# Patient Record
Sex: FEMALE | Race: WHITE | NOT HISPANIC OR LATINO | Employment: OTHER | URBAN - METROPOLITAN AREA
[De-identification: names, ages, dates, MRNs, and addresses within clinical notes are randomized per-mention and may not be internally consistent; named-entity substitution may affect disease eponyms.]

---

## 2017-02-16 ENCOUNTER — RECORDS - HEALTHEAST (OUTPATIENT)
Dept: LAB | Facility: CLINIC | Age: 72
End: 2017-02-16

## 2017-02-16 LAB
CHOLEST SERPL-MCNC: 181 MG/DL
FASTING STATUS PATIENT QL REPORTED: YES
HDLC SERPL-MCNC: 86 MG/DL
LDLC SERPL CALC-MCNC: 84 MG/DL
TRIGL SERPL-MCNC: 55 MG/DL

## 2017-05-09 ENCOUNTER — RECORDS - HEALTHEAST (OUTPATIENT)
Dept: ADMINISTRATIVE | Facility: OTHER | Age: 72
End: 2017-05-09

## 2017-05-16 ENCOUNTER — HOSPITAL ENCOUNTER (OUTPATIENT)
Dept: CARDIOLOGY | Facility: HOSPITAL | Age: 72
Discharge: HOME | End: 2017-05-16
Attending: FAMILY MEDICINE

## 2017-05-16 DIAGNOSIS — R00.2 PALPITATIONS: ICD-10-CM

## 2017-08-16 ENCOUNTER — RECORDS - HEALTHEAST (OUTPATIENT)
Dept: LAB | Facility: CLINIC | Age: 72
End: 2017-08-16

## 2017-08-16 LAB
CHOLEST SERPL-MCNC: 207 MG/DL
FASTING STATUS PATIENT QL REPORTED: YES
HDLC SERPL-MCNC: 108 MG/DL
LDLC SERPL CALC-MCNC: 85 MG/DL
TRIGL SERPL-MCNC: 71 MG/DL

## 2018-03-05 ENCOUNTER — RECORDS - HEALTHEAST (OUTPATIENT)
Dept: LAB | Facility: CLINIC | Age: 73
End: 2018-03-05

## 2018-03-05 LAB
ALBUMIN SERPL-MCNC: 3.7 G/DL (ref 3.5–5)
ALP SERPL-CCNC: 137 U/L (ref 45–120)
ALT SERPL W P-5'-P-CCNC: 27 U/L (ref 0–45)
ANION GAP SERPL CALCULATED.3IONS-SCNC: 9 MMOL/L (ref 5–18)
AST SERPL W P-5'-P-CCNC: 47 U/L (ref 0–40)
BILIRUB SERPL-MCNC: 1.5 MG/DL (ref 0–1)
BUN SERPL-MCNC: 7 MG/DL (ref 8–28)
CALCIUM SERPL-MCNC: 9.4 MG/DL (ref 8.5–10.5)
CHLORIDE BLD-SCNC: 106 MMOL/L (ref 98–107)
CO2 SERPL-SCNC: 24 MMOL/L (ref 22–31)
CREAT SERPL-MCNC: 0.62 MG/DL (ref 0.6–1.1)
GFR SERPL CREATININE-BSD FRML MDRD: >60 ML/MIN/1.73M2
GLUCOSE BLD-MCNC: 86 MG/DL (ref 70–125)
POTASSIUM BLD-SCNC: 3.8 MMOL/L (ref 3.5–5)
PROT SERPL-MCNC: 7 G/DL (ref 6–8)
SODIUM SERPL-SCNC: 139 MMOL/L (ref 136–145)
TSH SERPL DL<=0.005 MIU/L-ACNC: 2.92 UIU/ML (ref 0.3–5)

## 2018-09-17 ENCOUNTER — RECORDS - HEALTHEAST (OUTPATIENT)
Dept: LAB | Facility: CLINIC | Age: 73
End: 2018-09-17

## 2018-09-17 LAB
ALBUMIN SERPL-MCNC: 3.9 G/DL (ref 3.5–5)
ALP SERPL-CCNC: 115 U/L (ref 45–120)
ALT SERPL W P-5'-P-CCNC: 33 U/L (ref 0–45)
ANION GAP SERPL CALCULATED.3IONS-SCNC: 11 MMOL/L (ref 5–18)
AST SERPL W P-5'-P-CCNC: 51 U/L (ref 0–40)
BILIRUB SERPL-MCNC: 1.3 MG/DL (ref 0–1)
BUN SERPL-MCNC: 7 MG/DL (ref 8–28)
CALCIUM SERPL-MCNC: 9.8 MG/DL (ref 8.5–10.5)
CHLORIDE BLD-SCNC: 105 MMOL/L (ref 98–107)
CHOLEST SERPL-MCNC: 203 MG/DL
CO2 SERPL-SCNC: 24 MMOL/L (ref 22–31)
CREAT SERPL-MCNC: 0.65 MG/DL (ref 0.6–1.1)
ERYTHROCYTE [DISTWIDTH] IN BLOOD BY AUTOMATED COUNT: 13.1 % (ref 11–14.5)
FASTING STATUS PATIENT QL REPORTED: ABNORMAL
GFR SERPL CREATININE-BSD FRML MDRD: >60 ML/MIN/1.73M2
GLUCOSE BLD-MCNC: 112 MG/DL (ref 70–125)
HCT VFR BLD AUTO: 43.1 % (ref 35–47)
HDLC SERPL-MCNC: 117 MG/DL
HGB BLD-MCNC: 15.5 G/DL (ref 12–16)
LDLC SERPL CALC-MCNC: 76 MG/DL
MCH RBC QN AUTO: 35.4 PG (ref 27–34)
MCHC RBC AUTO-ENTMCNC: 36 G/DL (ref 32–36)
MCV RBC AUTO: 98 FL (ref 80–100)
PLATELET # BLD AUTO: 156 THOU/UL (ref 140–440)
PMV BLD AUTO: 11.3 FL (ref 8.5–12.5)
POTASSIUM BLD-SCNC: 3.6 MMOL/L (ref 3.5–5)
PROT SERPL-MCNC: 6.9 G/DL (ref 6–8)
RBC # BLD AUTO: 4.38 MILL/UL (ref 3.8–5.4)
SODIUM SERPL-SCNC: 140 MMOL/L (ref 136–145)
TRIGL SERPL-MCNC: 51 MG/DL
TSH SERPL DL<=0.005 MIU/L-ACNC: 5.54 UIU/ML (ref 0.3–5)
WBC: 7.7 THOU/UL (ref 4–11)

## 2019-03-14 ENCOUNTER — RECORDS - HEALTHEAST (OUTPATIENT)
Dept: ADMINISTRATIVE | Facility: OTHER | Age: 74
End: 2019-03-14

## 2019-03-19 ENCOUNTER — AMBULATORY - HEALTHEAST (OUTPATIENT)
Dept: ADMINISTRATIVE | Facility: CLINIC | Age: 74
End: 2019-03-19

## 2019-03-19 DIAGNOSIS — I65.21 CAROTID STENOSIS, RIGHT: ICD-10-CM

## 2019-03-27 ENCOUNTER — RECORDS - HEALTHEAST (OUTPATIENT)
Dept: LAB | Facility: CLINIC | Age: 74
End: 2019-03-27

## 2019-03-27 LAB
ALBUMIN SERPL-MCNC: 3.4 G/DL (ref 3.5–5)
ALP SERPL-CCNC: 132 U/L (ref 45–120)
ALT SERPL W P-5'-P-CCNC: 26 U/L (ref 0–45)
ANION GAP SERPL CALCULATED.3IONS-SCNC: 9 MMOL/L (ref 5–18)
AST SERPL W P-5'-P-CCNC: 40 U/L (ref 0–40)
BILIRUB SERPL-MCNC: 0.7 MG/DL (ref 0–1)
BUN SERPL-MCNC: 10 MG/DL (ref 8–28)
CALCIUM SERPL-MCNC: 9.3 MG/DL (ref 8.5–10.5)
CHLORIDE BLD-SCNC: 108 MMOL/L (ref 98–107)
CO2 SERPL-SCNC: 24 MMOL/L (ref 22–31)
CREAT SERPL-MCNC: 0.65 MG/DL (ref 0.6–1.1)
GFR SERPL CREATININE-BSD FRML MDRD: >60 ML/MIN/1.73M2
GLUCOSE BLD-MCNC: 94 MG/DL (ref 70–125)
POTASSIUM BLD-SCNC: 3.6 MMOL/L (ref 3.5–5)
PROT SERPL-MCNC: 6.4 G/DL (ref 6–8)
SODIUM SERPL-SCNC: 141 MMOL/L (ref 136–145)
T4 FREE SERPL-MCNC: 1.1 NG/DL (ref 0.7–1.8)
TSH SERPL DL<=0.005 MIU/L-ACNC: 0.15 UIU/ML (ref 0.3–5)

## 2019-04-03 ENCOUNTER — COMMUNICATION - HEALTHEAST (OUTPATIENT)
Dept: VASCULAR SURGERY | Facility: CLINIC | Age: 74
End: 2019-04-03

## 2019-04-03 ENCOUNTER — OFFICE VISIT - HEALTHEAST (OUTPATIENT)
Dept: VASCULAR SURGERY | Facility: CLINIC | Age: 74
End: 2019-04-03

## 2019-04-03 DIAGNOSIS — I65.21 CAROTID STENOSIS, RIGHT: ICD-10-CM

## 2019-04-03 RX ORDER — MIRTAZAPINE 15 MG/1
15 TABLET, FILM COATED ORAL AT BEDTIME
Status: SHIPPED | COMMUNITY
Start: 2018-11-26

## 2019-04-03 RX ORDER — ATORVASTATIN CALCIUM 80 MG/1
80 TABLET, FILM COATED ORAL AT BEDTIME
Status: SHIPPED | COMMUNITY
Start: 2016-12-16

## 2019-04-03 RX ORDER — ALBUTEROL SULFATE 90 UG/1
1-2 AEROSOL, METERED RESPIRATORY (INHALATION) 3 TIMES DAILY
Status: SHIPPED | COMMUNITY
Start: 2019-04-03

## 2019-04-03 RX ORDER — SERTRALINE HYDROCHLORIDE 25 MG/1
TABLET, FILM COATED ORAL
Status: SHIPPED | COMMUNITY
Start: 2013-09-16 | End: 2023-01-01

## 2019-04-03 RX ORDER — AMLODIPINE AND BENAZEPRIL HYDROCHLORIDE 10; 20 MG/1; MG/1
1 CAPSULE ORAL DAILY
Status: SHIPPED | COMMUNITY
Start: 2016-06-28

## 2019-04-03 RX ORDER — LEVOTHYROXINE SODIUM 88 UG/1
88 CAPSULE ORAL
Status: SHIPPED | COMMUNITY
Start: 2019-04-03 | End: 2023-01-01

## 2019-04-30 ENCOUNTER — RECORDS - HEALTHEAST (OUTPATIENT)
Dept: LAB | Facility: CLINIC | Age: 74
End: 2019-04-30

## 2019-04-30 LAB — TSH SERPL DL<=0.005 MIU/L-ACNC: 0.36 UIU/ML (ref 0.3–5)

## 2019-09-18 ENCOUNTER — RECORDS - HEALTHEAST (OUTPATIENT)
Dept: LAB | Facility: CLINIC | Age: 74
End: 2019-09-18

## 2019-09-18 LAB
ALBUMIN SERPL-MCNC: 3.5 G/DL (ref 3.5–5)
ALP SERPL-CCNC: 137 U/L (ref 45–120)
ALT SERPL W P-5'-P-CCNC: 17 U/L (ref 0–45)
ANION GAP SERPL CALCULATED.3IONS-SCNC: 14 MMOL/L (ref 5–18)
AST SERPL W P-5'-P-CCNC: 39 U/L (ref 0–40)
BILIRUB SERPL-MCNC: 0.8 MG/DL (ref 0–1)
BUN SERPL-MCNC: 6 MG/DL (ref 8–28)
CALCIUM SERPL-MCNC: 9.1 MG/DL (ref 8.5–10.5)
CHLORIDE BLD-SCNC: 111 MMOL/L (ref 98–107)
CHOLEST SERPL-MCNC: 188 MG/DL
CO2 SERPL-SCNC: 19 MMOL/L (ref 22–31)
CREAT SERPL-MCNC: 0.69 MG/DL (ref 0.6–1.1)
FASTING STATUS PATIENT QL REPORTED: YES
GFR SERPL CREATININE-BSD FRML MDRD: >60 ML/MIN/1.73M2
GLUCOSE BLD-MCNC: 94 MG/DL (ref 70–125)
HDLC SERPL-MCNC: 106 MG/DL
LDLC SERPL CALC-MCNC: 73 MG/DL
POTASSIUM BLD-SCNC: 3.5 MMOL/L (ref 3.5–5)
PROT SERPL-MCNC: 6.4 G/DL (ref 6–8)
SODIUM SERPL-SCNC: 144 MMOL/L (ref 136–145)
TRIGL SERPL-MCNC: 44 MG/DL

## 2019-09-19 LAB — 25(OH)D3 SERPL-MCNC: 47.3 NG/ML (ref 30–80)

## 2020-04-06 ENCOUNTER — RECORDS - HEALTHEAST (OUTPATIENT)
Dept: LAB | Facility: CLINIC | Age: 75
End: 2020-04-06

## 2020-04-06 LAB
ALBUMIN SERPL-MCNC: 3.7 G/DL (ref 3.5–5)
ALP SERPL-CCNC: 118 U/L (ref 45–120)
ALT SERPL W P-5'-P-CCNC: 26 U/L (ref 0–45)
ANION GAP SERPL CALCULATED.3IONS-SCNC: 11 MMOL/L (ref 5–18)
AST SERPL W P-5'-P-CCNC: 51 U/L (ref 0–40)
BILIRUB SERPL-MCNC: 0.9 MG/DL (ref 0–1)
BUN SERPL-MCNC: 9 MG/DL (ref 8–28)
CALCIUM SERPL-MCNC: 8.9 MG/DL (ref 8.5–10.5)
CHLORIDE BLD-SCNC: 107 MMOL/L (ref 98–107)
CO2 SERPL-SCNC: 23 MMOL/L (ref 22–31)
CREAT SERPL-MCNC: 0.67 MG/DL (ref 0.6–1.1)
GFR SERPL CREATININE-BSD FRML MDRD: >60 ML/MIN/1.73M2
GLUCOSE BLD-MCNC: 94 MG/DL (ref 70–125)
POTASSIUM BLD-SCNC: 3.8 MMOL/L (ref 3.5–5)
PROT SERPL-MCNC: 6.7 G/DL (ref 6–8)
SODIUM SERPL-SCNC: 141 MMOL/L (ref 136–145)
TSH SERPL DL<=0.005 MIU/L-ACNC: 0.44 UIU/ML (ref 0.3–5)

## 2020-10-14 ENCOUNTER — RECORDS - HEALTHEAST (OUTPATIENT)
Dept: LAB | Facility: CLINIC | Age: 75
End: 2020-10-14

## 2020-10-14 LAB
ALBUMIN SERPL-MCNC: 3.6 G/DL (ref 3.5–5)
ALP SERPL-CCNC: 124 U/L (ref 45–120)
ALT SERPL W P-5'-P-CCNC: 20 U/L (ref 0–45)
ANION GAP SERPL CALCULATED.3IONS-SCNC: 11 MMOL/L (ref 5–18)
AST SERPL W P-5'-P-CCNC: 41 U/L (ref 0–40)
BASOPHILS # BLD AUTO: 0.1 THOU/UL (ref 0–0.2)
BASOPHILS NFR BLD AUTO: 1 % (ref 0–2)
BILIRUB SERPL-MCNC: 1.1 MG/DL (ref 0–1)
BUN SERPL-MCNC: 6 MG/DL (ref 8–28)
CALCIUM SERPL-MCNC: 8.7 MG/DL (ref 8.5–10.5)
CHLORIDE BLD-SCNC: 109 MMOL/L (ref 98–107)
CHOLEST SERPL-MCNC: 170 MG/DL
CO2 SERPL-SCNC: 22 MMOL/L (ref 22–31)
CREAT SERPL-MCNC: 0.69 MG/DL (ref 0.6–1.1)
EOSINOPHIL # BLD AUTO: 0.2 THOU/UL (ref 0–0.4)
EOSINOPHIL NFR BLD AUTO: 2 % (ref 0–6)
ERYTHROCYTE [DISTWIDTH] IN BLOOD BY AUTOMATED COUNT: 14.6 % (ref 11–14.5)
FASTING STATUS PATIENT QL REPORTED: NORMAL
GFR SERPL CREATININE-BSD FRML MDRD: >60 ML/MIN/1.73M2
GLUCOSE BLD-MCNC: 89 MG/DL (ref 70–125)
HCT VFR BLD AUTO: 43.1 % (ref 35–47)
HDLC SERPL-MCNC: 87 MG/DL
HGB BLD-MCNC: 14.3 G/DL (ref 12–16)
IMM GRANULOCYTES # BLD: 0 THOU/UL
IMM GRANULOCYTES NFR BLD: 0 %
LDLC SERPL CALC-MCNC: 71 MG/DL
LYMPHOCYTES # BLD AUTO: 1.8 THOU/UL (ref 0.8–4.4)
LYMPHOCYTES NFR BLD AUTO: 26 % (ref 20–40)
MCH RBC QN AUTO: 33.3 PG (ref 27–34)
MCHC RBC AUTO-ENTMCNC: 33.2 G/DL (ref 32–36)
MCV RBC AUTO: 100 FL (ref 80–100)
MONOCYTES # BLD AUTO: 0.7 THOU/UL (ref 0–0.9)
MONOCYTES NFR BLD AUTO: 10 % (ref 2–10)
NEUTROPHILS # BLD AUTO: 4.2 THOU/UL (ref 2–7.7)
NEUTROPHILS NFR BLD AUTO: 61 % (ref 50–70)
PLATELET # BLD AUTO: 157 THOU/UL (ref 140–440)
PMV BLD AUTO: 12.1 FL (ref 8.5–12.5)
POTASSIUM BLD-SCNC: 3.8 MMOL/L (ref 3.5–5)
PROT SERPL-MCNC: 6.3 G/DL (ref 6–8)
RBC # BLD AUTO: 4.3 MILL/UL (ref 3.8–5.4)
SODIUM SERPL-SCNC: 142 MMOL/L (ref 136–145)
TRIGL SERPL-MCNC: 60 MG/DL
TSH SERPL DL<=0.005 MIU/L-ACNC: 2.37 UIU/ML (ref 0.3–5)
VIT B12 SERPL-MCNC: 695 PG/ML (ref 213–816)
WBC: 6.9 THOU/UL (ref 4–11)

## 2021-04-14 ENCOUNTER — RECORDS - HEALTHEAST (OUTPATIENT)
Dept: LAB | Facility: CLINIC | Age: 76
End: 2021-04-14

## 2021-04-14 LAB
ALBUMIN SERPL-MCNC: 3.4 G/DL (ref 3.5–5)
ALP SERPL-CCNC: 133 U/L (ref 45–120)
ALT SERPL W P-5'-P-CCNC: 20 U/L (ref 0–45)
ANION GAP SERPL CALCULATED.3IONS-SCNC: 11 MMOL/L (ref 5–18)
AST SERPL W P-5'-P-CCNC: 44 U/L (ref 0–40)
BILIRUB SERPL-MCNC: 0.9 MG/DL (ref 0–1)
BUN SERPL-MCNC: 7 MG/DL (ref 8–28)
CALCIUM SERPL-MCNC: 9.1 MG/DL (ref 8.5–10.5)
CHLORIDE BLD-SCNC: 109 MMOL/L (ref 98–107)
CHOLEST SERPL-MCNC: 154 MG/DL
CO2 SERPL-SCNC: 21 MMOL/L (ref 22–31)
CREAT SERPL-MCNC: 0.71 MG/DL (ref 0.6–1.1)
FASTING STATUS PATIENT QL REPORTED: NORMAL
GFR SERPL CREATININE-BSD FRML MDRD: >60 ML/MIN/1.73M2
GLUCOSE BLD-MCNC: 90 MG/DL (ref 70–125)
HDLC SERPL-MCNC: 82 MG/DL
LDLC SERPL CALC-MCNC: 60 MG/DL
POTASSIUM BLD-SCNC: 3.5 MMOL/L (ref 3.5–5)
PROT SERPL-MCNC: 6.4 G/DL (ref 6–8)
SODIUM SERPL-SCNC: 141 MMOL/L (ref 136–145)
TRIGL SERPL-MCNC: 58 MG/DL

## 2021-04-15 LAB — TSH SERPL DL<=0.005 MIU/L-ACNC: 1.35 UIU/ML (ref 0.3–5)

## 2021-05-24 ENCOUNTER — RECORDS - HEALTHEAST (OUTPATIENT)
Dept: ADMINISTRATIVE | Facility: CLINIC | Age: 76
End: 2021-05-24

## 2021-05-27 NOTE — PROGRESS NOTES
VASCULAR SURGERY OUTPATIENT CONSULT OR VISIT   VASCULAR SURGEON: Jhoana Agosto MD    LOCATION:  [facility] [place]    Jessica Ryenolds   Medical Record #:  [id number]  YOB: 1945  Age:  73 y.o.     Date of Service: 4/3/2019    PRIMARY CARE PROVIDER: Shaylee Jacques MD      Reason for consultation: Consult from Dr Barber for patient with bilateral carotid stenosis    IMPRESSION: [facility] duplex appears to suggest bilateral heavily calcified moderate carotid stenosis.  Patient is completely asymptomatic but has a history of strokes in both her father and mother and is very concerned about her stroke risk.  In the process of quitting smoking.  On aspirin and high-dose statin.  CTA clearly demonstrates right internal carotid artery occlusion.  Left carotid artery has no important stenosis.  Widely patent [place] of [place].    RECOMMENDATION: Patient with occluded right internal carotid artery of unknown unknown duration.  No role for revascularization.  No risk of stroke from this artery.  Protected by widely patent [place] and so overall risk for stroke is mitigated.  Someone with an occluded carotid artery, however has an increased cardiovascular risk both for MI and as much is a 4% risk of stroke and so risk factor optimization is critical.  Continue with smoking cessation attempts and high-dose statin.  Return visit in 1 year with carotid duplex.    HPI:  Jessica Reynolds is a 73 y.o. female who was seen today in consultation for carotid stenosis.  She has never had TIA or amaurosis fugax.  She had a screening ultrasound which demonstrated some degree of stenosis and then test at [facility] which confirmed moderate stenosis bilaterally.    Very concerned about her risk of stroke now given that both her father and mother had strokes.  Father's was fatal.    She herself has not had amaurosis fugax or TIAs or strokes.  No prior cardiac disease either.  Was apparently told that she had some  degree of PAD on her screening studies but is not symptomatic her legs.  Lifelong smoker who is in the process of quitting.  She said she cheated once with a cigarette yesterday.  Now on high-dose statin and aspirin in the lower her risk as well.    History of significant COPD.  Remote history of rectal cancer identified in hemorrhoid but not requiring any major resection.  Other major surgeries.    No other new health issues    PHH:  No past medical history on file.   No past surgical history on file.    ALLERGIES:  Sudafed [pseudoephedrine hcl]    MEDS:    Current Outpatient Medications:      albuterol (PROAIR HFA) 90 mcg/actuation inhaler, 2 puff(s), Disp: , Rfl:      amLODIPine-benazepril (LOTREL) 10-20 mg per capsule, 1 cap(s), Disp: , Rfl:      aspirin (ASPIR-81) 81 MG EC tablet, 1 tab, Disp: , Rfl:      atorvastatin (LIPITOR) 80 MG tablet, 1 tab(s), Disp: , Rfl:      CHOLECALCIFEROL, VITAMIN D3, ORAL, daily., Disp: , Rfl:      levothyroxine (TIROSINT) 88 mcg cap, Take 88 mcg by mouth., Disp: , Rfl:      mirtazapine (REMERON) 15 MG tablet, 1 tab(s), Disp: , Rfl:      mometasone-formoterol (DULERA) 200-5 mcg/actuation HFAA inhaler, 2 puff(s), Disp: , Rfl:      omega-3-dha-epa-dpa-fish oil (OMEGA-3 2100) 1,050-1,200 mg cap, 1 tab(s), Disp: , Rfl:      sertraline (ZOLOFT) 25 MG tablet, TAKE ONE TABLET BY MOUTH EVERY DAY, Disp: , Rfl:      vitamin C-biotin (HAIR-SKIN-NAILS, VIT C-BIOTIN,) 50 mg -1,250 mcg Chew, 3 cap(s), Disp: , Rfl:     SOCIAL HABITS:    Social History     Tobacco Use   Smoking Status Current Every Day Smoker     Types: Cigarettes   Tobacco Comment    4 cigarettes in the past 2 weeks       Social History     Substance and Sexual Activity   Alcohol Use Yes     Alcohol/week: 1.2 oz     Types: 2 Cans of beer per week       Social History     Substance and Sexual Activity   Drug Use No       FAMILY HISTORY:    Family History   Problem Relation Age of Onset     Stroke Mother      Heart disease Father       Stroke Father      Lung cancer Sister      Diabetes Maternal Grandmother      Drug abuse Sister        REVIEW OF SYSTEMS:    A 12 point ROS was reviewed and except for what is listed in the HPI above, all others are negative    PE:  /70   Pulse 80   Resp 16   Wt 131 lb (59.4 kg)   Wt Readings from Last 1 Encounters:   04/03/19 131 lb (59.4 kg)     There is no height or weight on file to calculate BMI.    EXAM:  GENERAL: This is a well-developed 73 y.o. female who appears her stated age  EYES: Grossly normal.  MOUTH: Buccal mucosa normal   CARDIAC:  Not assessed  CHEST/LUNG:  Not Assessed  GASTROINTESINAL (ABDOMEN):Not Assessed   MUSCULOSKELETAL: Grossly normal and both lower extremities are intact.  HEME/LYMPH: No lymphedema  NEUROLOGIC: Focally intact, Alert and oriented x 3.   PSYCH: appropriate affect  INTEGUMENT: No open lesions or ulcers  Pulse Exam:   Carotid: No Bruit      DIAGNOSTIC STUDIES:     Images:  No results found.    I personally reviewed the images and my interpretation is that the [facility] duplex appears to show a very calcified carotid bulbs bilaterally but with peak systolic velocities that are not particularly elevated and normal or low end-diastolic velocities.  Right CCA waveforms are concerning for internal carotid artery occlusion..    LABS:      Sodium   Date Value Ref Range Status   03/27/2019 141 136 - 145 mmol/L Final   09/17/2018 140 136 - 145 mmol/L Final   03/05/2018 139 136 - 145 mmol/L Final     Potassium   Date Value Ref Range Status   03/27/2019 3.6 3.5 - 5.0 mmol/L Final   09/17/2018 3.6 3.5 - 5.0 mmol/L Final   03/05/2018 3.8 3.5 - 5.0 mmol/L Final     Chloride   Date Value Ref Range Status   03/27/2019 108 (H) 98 - 107 mmol/L Final   09/17/2018 105 98 - 107 mmol/L Final   03/05/2018 106 98 - 107 mmol/L Final     BUN   Date Value Ref Range Status   03/27/2019 10 8 - 28 mg/dL Final   09/17/2018 7 (L) 8 - 28 mg/dL Final   03/05/2018 7 (L) 8 - 28 mg/dL Final     Creatinine    Date Value Ref Range Status   03/27/2019 0.65 0.60 - 1.10 mg/dL Final   09/17/2018 0.65 0.60 - 1.10 mg/dL Final   03/05/2018 0.62 0.60 - 1.10 mg/dL Final     Hemoglobin   Date Value Ref Range Status   09/17/2018 15.5 12.0 - 16.0 g/dL Final   09/09/2015 15.4 12.0 - 16.0 g/dL Final   12/20/2010 14.7 12.0 - 16.0 g/dL Final     Platelets   Date Value Ref Range Status   09/17/2018 156 140 - 440 thou/uL Final   09/09/2015 162 140 - 440 thou/uL Final   12/20/2010 161 140 - 440 thou/uL Final         Jhoana Agosto MD  VASCULAR SURGERY

## 2021-05-27 NOTE — PROGRESS NOTES
R Carotid stenosis, right- Dr. Crow referring .US comp @ [facility]  on 3/11/19 and showed moderate to severe R carotid plaque with moderate proximal ICA stenosis. She was aware she had it this past October.

## 2021-06-02 ENCOUNTER — RECORDS - HEALTHEAST (OUTPATIENT)
Dept: ADMINISTRATIVE | Facility: CLINIC | Age: 76
End: 2021-06-02

## 2021-06-02 VITALS — WEIGHT: 131 LBS

## 2021-06-16 PROBLEM — D12.6 ADENOMATOUS POLYP OF COLON: Status: ACTIVE | Noted: 2019-04-03

## 2021-06-16 PROBLEM — K76.0 STEATOSIS OF LIVER: Status: ACTIVE | Noted: 2019-04-03

## 2021-06-16 PROBLEM — F51.01 PRIMARY INSOMNIA: Status: ACTIVE | Noted: 2019-04-03

## 2021-06-16 PROBLEM — F41.9 ANXIETY: Status: ACTIVE | Noted: 2019-04-03

## 2021-06-16 PROBLEM — R74.8 ELEVATED LIVER ENZYMES: Status: ACTIVE | Noted: 2019-04-03

## 2021-06-16 PROBLEM — I65.21 STENOSIS OF RIGHT CAROTID ARTERY: Status: ACTIVE | Noted: 2019-04-03

## 2021-07-13 ENCOUNTER — RECORDS - HEALTHEAST (OUTPATIENT)
Dept: ADMINISTRATIVE | Facility: CLINIC | Age: 76
End: 2021-07-13

## 2021-07-21 ENCOUNTER — RECORDS - HEALTHEAST (OUTPATIENT)
Dept: ADMINISTRATIVE | Facility: CLINIC | Age: 76
End: 2021-07-21

## 2021-07-22 ENCOUNTER — LAB REQUISITION (OUTPATIENT)
Dept: LAB | Facility: CLINIC | Age: 76
End: 2021-07-22

## 2021-07-22 DIAGNOSIS — R35.0 FREQUENCY OF MICTURITION: ICD-10-CM

## 2021-07-22 PROCEDURE — 87086 URINE CULTURE/COLONY COUNT: CPT | Performed by: PHYSICIAN ASSISTANT

## 2021-07-23 LAB — BACTERIA UR CULT: NO GROWTH

## 2021-07-29 ENCOUNTER — LAB REQUISITION (OUTPATIENT)
Dept: LAB | Facility: CLINIC | Age: 76
End: 2021-07-29

## 2021-07-29 DIAGNOSIS — R31.9 HEMATURIA, UNSPECIFIED: ICD-10-CM

## 2021-07-29 PROCEDURE — 87086 URINE CULTURE/COLONY COUNT: CPT | Performed by: PHYSICIAN ASSISTANT

## 2021-07-31 LAB — BACTERIA UR CULT: NO GROWTH

## 2021-09-14 ENCOUNTER — LAB REQUISITION (OUTPATIENT)
Dept: LAB | Facility: CLINIC | Age: 76
End: 2021-09-14

## 2021-09-14 DIAGNOSIS — I10 ESSENTIAL (PRIMARY) HYPERTENSION: ICD-10-CM

## 2021-09-14 DIAGNOSIS — E03.9 HYPOTHYROIDISM, UNSPECIFIED: ICD-10-CM

## 2021-09-14 LAB
ALBUMIN SERPL-MCNC: 3.5 G/DL (ref 3.5–5)
ALP SERPL-CCNC: 125 U/L (ref 45–120)
ALT SERPL W P-5'-P-CCNC: 27 U/L (ref 0–45)
ANION GAP SERPL CALCULATED.3IONS-SCNC: 12 MMOL/L (ref 5–18)
AST SERPL W P-5'-P-CCNC: 47 U/L (ref 0–40)
BILIRUB SERPL-MCNC: 1.1 MG/DL (ref 0–1)
BUN SERPL-MCNC: 8 MG/DL (ref 8–28)
CALCIUM SERPL-MCNC: 9.4 MG/DL (ref 8.5–10.5)
CHLORIDE BLD-SCNC: 107 MMOL/L (ref 98–107)
CO2 SERPL-SCNC: 25 MMOL/L (ref 22–31)
CREAT SERPL-MCNC: 0.82 MG/DL (ref 0.6–1.1)
GFR SERPL CREATININE-BSD FRML MDRD: 69 ML/MIN/1.73M2
GLUCOSE BLD-MCNC: 108 MG/DL (ref 70–125)
POTASSIUM BLD-SCNC: 3.9 MMOL/L (ref 3.5–5)
PROT SERPL-MCNC: 6.5 G/DL (ref 6–8)
SODIUM SERPL-SCNC: 144 MMOL/L (ref 136–145)
TSH SERPL DL<=0.005 MIU/L-ACNC: 0.88 UIU/ML (ref 0.3–5)

## 2021-09-14 PROCEDURE — 82247 BILIRUBIN TOTAL: CPT | Performed by: FAMILY MEDICINE

## 2021-09-14 PROCEDURE — 84443 ASSAY THYROID STIM HORMONE: CPT | Performed by: FAMILY MEDICINE

## 2021-09-14 PROCEDURE — 82040 ASSAY OF SERUM ALBUMIN: CPT | Performed by: FAMILY MEDICINE

## 2022-02-11 ENCOUNTER — LAB REQUISITION (OUTPATIENT)
Dept: LAB | Facility: CLINIC | Age: 77
End: 2022-02-11

## 2022-02-11 DIAGNOSIS — I10 ESSENTIAL (PRIMARY) HYPERTENSION: ICD-10-CM

## 2022-02-11 DIAGNOSIS — E03.9 HYPOTHYROIDISM, UNSPECIFIED: ICD-10-CM

## 2022-02-11 LAB
ALBUMIN SERPL-MCNC: 3.6 G/DL (ref 3.5–5)
ALP SERPL-CCNC: 123 U/L (ref 45–120)
ALT SERPL W P-5'-P-CCNC: 29 U/L (ref 0–45)
ANION GAP SERPL CALCULATED.3IONS-SCNC: 10 MMOL/L (ref 5–18)
AST SERPL W P-5'-P-CCNC: 55 U/L (ref 0–40)
BILIRUB SERPL-MCNC: 0.9 MG/DL (ref 0–1)
BUN SERPL-MCNC: 12 MG/DL (ref 8–28)
CALCIUM SERPL-MCNC: 9.6 MG/DL (ref 8.5–10.5)
CHLORIDE BLD-SCNC: 106 MMOL/L (ref 98–107)
CHOLEST SERPL-MCNC: 186 MG/DL
CO2 SERPL-SCNC: 25 MMOL/L (ref 22–31)
CREAT SERPL-MCNC: 0.8 MG/DL (ref 0.6–1.1)
GFR SERPL CREATININE-BSD FRML MDRD: 75 ML/MIN/1.73M2
GLUCOSE BLD-MCNC: 105 MG/DL (ref 70–125)
HDLC SERPL-MCNC: 112 MG/DL
LDLC SERPL CALC-MCNC: 64 MG/DL
POTASSIUM BLD-SCNC: 3.9 MMOL/L (ref 3.5–5)
PROT SERPL-MCNC: 6.8 G/DL (ref 6–8)
SODIUM SERPL-SCNC: 141 MMOL/L (ref 136–145)
TRIGL SERPL-MCNC: 49 MG/DL
TSH SERPL DL<=0.005 MIU/L-ACNC: 1.76 UIU/ML (ref 0.3–5)

## 2022-02-11 PROCEDURE — 80061 LIPID PANEL: CPT | Performed by: FAMILY MEDICINE

## 2022-02-11 PROCEDURE — 84443 ASSAY THYROID STIM HORMONE: CPT | Performed by: FAMILY MEDICINE

## 2022-02-11 PROCEDURE — 80053 COMPREHEN METABOLIC PANEL: CPT | Performed by: FAMILY MEDICINE

## 2022-07-07 ENCOUNTER — LAB REQUISITION (OUTPATIENT)
Dept: LAB | Facility: CLINIC | Age: 77
End: 2022-07-07
Payer: COMMERCIAL

## 2022-07-07 DIAGNOSIS — Z01.812 ENCOUNTER FOR PREPROCEDURAL LABORATORY EXAMINATION: ICD-10-CM

## 2022-07-07 LAB — SARS-COV-2 RNA RESP QL NAA+PROBE: NEGATIVE

## 2022-07-07 PROCEDURE — U0005 INFEC AGEN DETEC AMPLI PROBE: HCPCS | Mod: ORL | Performed by: STUDENT IN AN ORGANIZED HEALTH CARE EDUCATION/TRAINING PROGRAM

## 2022-07-27 ENCOUNTER — LAB REQUISITION (OUTPATIENT)
Dept: LAB | Facility: CLINIC | Age: 77
End: 2022-07-27

## 2022-07-27 DIAGNOSIS — R31.9 HEMATURIA, UNSPECIFIED: ICD-10-CM

## 2022-07-27 PROCEDURE — 87086 URINE CULTURE/COLONY COUNT: CPT | Performed by: STUDENT IN AN ORGANIZED HEALTH CARE EDUCATION/TRAINING PROGRAM

## 2022-07-29 LAB — BACTERIA UR CULT: NORMAL

## 2022-08-08 ENCOUNTER — LAB REQUISITION (OUTPATIENT)
Dept: LAB | Facility: CLINIC | Age: 77
End: 2022-08-08

## 2022-08-08 DIAGNOSIS — R31.9 HEMATURIA, UNSPECIFIED: ICD-10-CM

## 2022-08-08 PROCEDURE — 87086 URINE CULTURE/COLONY COUNT: CPT | Performed by: FAMILY MEDICINE

## 2022-08-10 ENCOUNTER — LAB REQUISITION (OUTPATIENT)
Dept: LAB | Facility: CLINIC | Age: 77
End: 2022-08-10

## 2022-08-10 DIAGNOSIS — E78.5 HYPERLIPIDEMIA, UNSPECIFIED: ICD-10-CM

## 2022-08-10 LAB
ALBUMIN SERPL BCG-MCNC: 3.8 G/DL (ref 3.5–5.2)
ALP SERPL-CCNC: 105 U/L (ref 35–104)
ALT SERPL W P-5'-P-CCNC: 24 U/L (ref 10–35)
ANION GAP SERPL CALCULATED.3IONS-SCNC: 10 MMOL/L (ref 7–15)
AST SERPL W P-5'-P-CCNC: 36 U/L (ref 10–35)
BACTERIA UR CULT: NORMAL
BILIRUB SERPL-MCNC: 0.7 MG/DL
BUN SERPL-MCNC: 7.4 MG/DL (ref 8–23)
CALCIUM SERPL-MCNC: 9.2 MG/DL (ref 8.8–10.2)
CHLORIDE SERPL-SCNC: 101 MMOL/L (ref 98–107)
CREAT SERPL-MCNC: 0.81 MG/DL (ref 0.51–0.95)
DEPRECATED HCO3 PLAS-SCNC: 23 MMOL/L (ref 22–29)
GFR SERPL CREATININE-BSD FRML MDRD: 74 ML/MIN/1.73M2
GLUCOSE SERPL-MCNC: 143 MG/DL (ref 70–99)
POTASSIUM SERPL-SCNC: 3.9 MMOL/L (ref 3.4–5.3)
PROT SERPL-MCNC: 6 G/DL (ref 6.4–8.3)
SODIUM SERPL-SCNC: 134 MMOL/L (ref 136–145)

## 2022-08-10 PROCEDURE — 80053 COMPREHEN METABOLIC PANEL: CPT | Performed by: FAMILY MEDICINE

## 2023-01-01 ENCOUNTER — LAB REQUISITION (OUTPATIENT)
Dept: LAB | Facility: CLINIC | Age: 78
End: 2023-01-01

## 2023-01-01 ENCOUNTER — HOSPITAL ENCOUNTER (OUTPATIENT)
Facility: HOSPITAL | Age: 78
Discharge: HOME | End: 2023-03-08
Attending: SPECIALIST | Admitting: SPECIALIST
Payer: COMMERCIAL

## 2023-01-01 ENCOUNTER — ANESTHESIA (OUTPATIENT)
Dept: SURGERY | Facility: HOSPITAL | Age: 78
End: 2023-01-01
Payer: COMMERCIAL

## 2023-01-01 ENCOUNTER — MEDICAL CORRESPONDENCE (OUTPATIENT)
Dept: HEALTH INFORMATION MANAGEMENT | Facility: CLINIC | Age: 78
End: 2023-01-01
Payer: COMMERCIAL

## 2023-01-01 ENCOUNTER — TRANSFERRED RECORDS (OUTPATIENT)
Dept: HEALTH INFORMATION MANAGEMENT | Facility: CLINIC | Age: 78
End: 2023-01-01
Payer: COMMERCIAL

## 2023-01-01 ENCOUNTER — HOSPITAL ENCOUNTER (INPATIENT)
Facility: HOSPITAL | Age: 78
LOS: 7 days | DRG: 329 | End: 2024-01-07
Attending: EMERGENCY MEDICINE | Admitting: HOSPITALIST
Payer: COMMERCIAL

## 2023-01-01 ENCOUNTER — HOSPITAL ENCOUNTER (OUTPATIENT)
Dept: CT IMAGING | Facility: HOSPITAL | Age: 78
Discharge: HOME | End: 2023-01-28
Attending: PHYSICIAN ASSISTANT | Admitting: PHYSICIAN ASSISTANT
Payer: COMMERCIAL

## 2023-01-01 ENCOUNTER — TRANSCRIBE ORDERS (OUTPATIENT)
Dept: OTHER | Age: 78
End: 2023-01-01

## 2023-01-01 ENCOUNTER — APPOINTMENT (OUTPATIENT)
Dept: RADIOLOGY | Facility: HOSPITAL | Age: 78
End: 2023-01-01
Attending: SPECIALIST
Payer: COMMERCIAL

## 2023-01-01 ENCOUNTER — APPOINTMENT (OUTPATIENT)
Dept: CT IMAGING | Facility: HOSPITAL | Age: 78
DRG: 329 | End: 2023-01-01
Attending: EMERGENCY MEDICINE
Payer: COMMERCIAL

## 2023-01-01 ENCOUNTER — OFFICE VISIT (OUTPATIENT)
Dept: SURGERY | Facility: CLINIC | Age: 78
End: 2023-01-01
Payer: COMMERCIAL

## 2023-01-01 ENCOUNTER — ANESTHESIA EVENT (OUTPATIENT)
Dept: SURGERY | Facility: HOSPITAL | Age: 78
End: 2023-01-01
Payer: COMMERCIAL

## 2023-01-01 VITALS
HEART RATE: 70 BPM | WEIGHT: 124.1 LBS | SYSTOLIC BLOOD PRESSURE: 134 MMHG | HEIGHT: 62 IN | BODY MASS INDEX: 22.84 KG/M2 | TEMPERATURE: 98.9 F | OXYGEN SATURATION: 97 % | RESPIRATION RATE: 20 BRPM | DIASTOLIC BLOOD PRESSURE: 54 MMHG

## 2023-01-01 DIAGNOSIS — N20.0 STONE, KIDNEY: ICD-10-CM

## 2023-01-01 DIAGNOSIS — I10 ESSENTIAL (PRIMARY) HYPERTENSION: ICD-10-CM

## 2023-01-01 DIAGNOSIS — D62 ACUTE BLOOD LOSS AS CAUSE OF POSTOPERATIVE ANEMIA: Primary | ICD-10-CM

## 2023-01-01 DIAGNOSIS — J02.9 ACUTE PHARYNGITIS, UNSPECIFIED: ICD-10-CM

## 2023-01-01 DIAGNOSIS — K43.9 VENTRAL HERNIA WITHOUT OBSTRUCTION OR GANGRENE: ICD-10-CM

## 2023-01-01 DIAGNOSIS — E03.9 HYPOTHYROIDISM, UNSPECIFIED: ICD-10-CM

## 2023-01-01 DIAGNOSIS — E78.5 HYPERLIPIDEMIA, UNSPECIFIED: ICD-10-CM

## 2023-01-01 DIAGNOSIS — R30.0 DYSURIA: ICD-10-CM

## 2023-01-01 DIAGNOSIS — K62.5 BRBPR (BRIGHT RED BLOOD PER RECTUM): ICD-10-CM

## 2023-01-01 DIAGNOSIS — Z01.818 ENCOUNTER FOR OTHER PREPROCEDURAL EXAMINATION: ICD-10-CM

## 2023-01-01 DIAGNOSIS — K43.9 VENTRAL HERNIA WITHOUT OBSTRUCTION OR GANGRENE: Primary | ICD-10-CM

## 2023-01-01 DIAGNOSIS — N13.30 HYDRONEPHROSIS, LEFT: Primary | ICD-10-CM

## 2023-01-01 DIAGNOSIS — R35.0 FREQUENCY OF MICTURITION: ICD-10-CM

## 2023-01-01 DIAGNOSIS — R31.0 GROSS HEMATURIA: ICD-10-CM

## 2023-01-01 DIAGNOSIS — N39.0 URINARY TRACT INFECTION, SITE NOT SPECIFIED: ICD-10-CM

## 2023-01-01 LAB
ABO/RH(D): NORMAL
ALBUMIN SERPL BCG-MCNC: 3.8 G/DL (ref 3.5–5.2)
ALBUMIN SERPL BCG-MCNC: 3.8 G/DL (ref 3.5–5.2)
ALBUMIN SERPL BCG-MCNC: 4.2 G/DL (ref 3.5–5.2)
ALP SERPL-CCNC: 109 U/L (ref 35–104)
ALP SERPL-CCNC: 119 U/L (ref 35–104)
ALP SERPL-CCNC: 89 U/L (ref 40–150)
ALT SERPL W P-5'-P-CCNC: 21 U/L (ref 0–50)
ALT SERPL W P-5'-P-CCNC: 30 U/L (ref 10–35)
ALT SERPL W P-5'-P-CCNC: 31 U/L (ref 10–35)
ANION GAP SERPL CALCULATED.3IONS-SCNC: 11 MMOL/L (ref 7–15)
ANION GAP SERPL CALCULATED.3IONS-SCNC: 13 MMOL/L (ref 7–15)
ANION GAP SERPL CALCULATED.3IONS-SCNC: 15 MMOL/L (ref 7–15)
ANION GAP SERPL CALCULATED.3IONS-SCNC: 15 MMOL/L (ref 7–15)
ANTIBODY SCREEN: NEGATIVE
APTT PPP: 32 SECONDS (ref 22–38)
AST SERPL W P-5'-P-CCNC: 34 U/L (ref 0–45)
AST SERPL W P-5'-P-CCNC: 55 U/L (ref 10–35)
AST SERPL W P-5'-P-CCNC: 57 U/L (ref 10–35)
BACTERIA SPEC CULT: NORMAL
BACTERIA UR CULT: ABNORMAL
BACTERIA UR CULT: ABNORMAL
BACTERIA UR CULT: NORMAL
BACTERIA UR CULT: NORMAL
BASOPHILS # BLD AUTO: 0 10E3/UL (ref 0–0.2)
BASOPHILS # BLD AUTO: 0.1 10E3/UL (ref 0–0.2)
BASOPHILS NFR BLD AUTO: 1 %
BASOPHILS NFR BLD AUTO: 1 %
BILIRUB DIRECT SERPL-MCNC: <0.2 MG/DL (ref 0–0.3)
BILIRUB SERPL-MCNC: 0.5 MG/DL
BILIRUB SERPL-MCNC: 0.6 MG/DL
BILIRUB SERPL-MCNC: 0.6 MG/DL
BUN SERPL-MCNC: 11.5 MG/DL (ref 8–23)
BUN SERPL-MCNC: 11.7 MG/DL (ref 8–23)
BUN SERPL-MCNC: 8.1 MG/DL (ref 8–23)
BUN SERPL-MCNC: 9 MG/DL (ref 8–23)
CALCIUM SERPL-MCNC: 8.9 MG/DL (ref 8.8–10.2)
CALCIUM SERPL-MCNC: 9.1 MG/DL (ref 8.8–10.2)
CALCIUM SERPL-MCNC: 9.2 MG/DL (ref 8.8–10.2)
CALCIUM SERPL-MCNC: 9.8 MG/DL (ref 8.8–10.2)
CHLORIDE SERPL-SCNC: 101 MMOL/L (ref 98–107)
CHLORIDE SERPL-SCNC: 103 MMOL/L (ref 98–107)
CHLORIDE SERPL-SCNC: 104 MMOL/L (ref 98–107)
CHLORIDE SERPL-SCNC: 94 MMOL/L (ref 98–107)
CHOLEST SERPL-MCNC: 185 MG/DL
CREAT SERPL-MCNC: 0.78 MG/DL (ref 0.51–0.95)
CREAT SERPL-MCNC: 0.84 MG/DL (ref 0.51–0.95)
CREAT SERPL-MCNC: 0.86 MG/DL (ref 0.51–0.95)
CREAT SERPL-MCNC: 0.96 MG/DL (ref 0.51–0.95)
DEPRECATED HCO3 PLAS-SCNC: 19 MMOL/L (ref 22–29)
DEPRECATED HCO3 PLAS-SCNC: 21 MMOL/L (ref 22–29)
DEPRECATED HCO3 PLAS-SCNC: 24 MMOL/L (ref 22–29)
DEPRECATED HCO3 PLAS-SCNC: 26 MMOL/L (ref 22–29)
EGFRCR SERPLBLD CKD-EPI 2021: 60 ML/MIN/1.73M2
EOSINOPHIL # BLD AUTO: 0.2 10E3/UL (ref 0–0.7)
EOSINOPHIL # BLD AUTO: 0.3 10E3/UL (ref 0–0.7)
EOSINOPHIL NFR BLD AUTO: 3 %
EOSINOPHIL NFR BLD AUTO: 4 %
ERYTHROCYTE [DISTWIDTH] IN BLOOD BY AUTOMATED COUNT: 14 % (ref 10–15)
ERYTHROCYTE [DISTWIDTH] IN BLOOD BY AUTOMATED COUNT: 14.6 % (ref 10–15)
GFR SERPL CREATININE-BSD FRML MDRD: 69 ML/MIN/1.73M2
GFR SERPL CREATININE-BSD FRML MDRD: 71 ML/MIN/1.73M2
GFR SERPL CREATININE-BSD FRML MDRD: 77 ML/MIN/1.73M2
GLUCOSE SERPL-MCNC: 106 MG/DL (ref 70–99)
GLUCOSE SERPL-MCNC: 117 MG/DL (ref 70–99)
GLUCOSE SERPL-MCNC: 185 MG/DL (ref 70–99)
GLUCOSE SERPL-MCNC: 90 MG/DL (ref 70–99)
HBA1C MFR BLD: 5.7 %
HCT VFR BLD AUTO: 36.9 % (ref 35–47)
HCT VFR BLD AUTO: 41.1 % (ref 35–47)
HDLC SERPL-MCNC: 110 MG/DL
HGB BLD-MCNC: 12.6 G/DL (ref 11.7–15.7)
HGB BLD-MCNC: 13.7 G/DL (ref 11.7–15.7)
IMM GRANULOCYTES # BLD: 0 10E3/UL
IMM GRANULOCYTES # BLD: 0 10E3/UL
IMM GRANULOCYTES NFR BLD: 0 %
IMM GRANULOCYTES NFR BLD: 1 %
INR PPP: 1.1 (ref 0.85–1.15)
LDLC SERPL CALC-MCNC: 65 MG/DL
LYMPHOCYTES # BLD AUTO: 1.7 10E3/UL (ref 0.8–5.3)
LYMPHOCYTES # BLD AUTO: 1.8 10E3/UL (ref 0.8–5.3)
LYMPHOCYTES NFR BLD AUTO: 23 %
LYMPHOCYTES NFR BLD AUTO: 27 %
MCH RBC QN AUTO: 33 PG (ref 26.5–33)
MCH RBC QN AUTO: 33.7 PG (ref 26.5–33)
MCHC RBC AUTO-ENTMCNC: 33.3 G/DL (ref 31.5–36.5)
MCHC RBC AUTO-ENTMCNC: 34.1 G/DL (ref 31.5–36.5)
MCV RBC AUTO: 101 FL (ref 78–100)
MCV RBC AUTO: 97 FL (ref 78–100)
MONOCYTES # BLD AUTO: 0.6 10E3/UL (ref 0–1.3)
MONOCYTES # BLD AUTO: 0.8 10E3/UL (ref 0–1.3)
MONOCYTES NFR BLD AUTO: 10 %
MONOCYTES NFR BLD AUTO: 10 %
NEUTROPHILS # BLD AUTO: 3.6 10E3/UL (ref 1.6–8.3)
NEUTROPHILS # BLD AUTO: 4.8 10E3/UL (ref 1.6–8.3)
NEUTROPHILS NFR BLD AUTO: 58 %
NEUTROPHILS NFR BLD AUTO: 62 %
NONHDLC SERPL-MCNC: 75 MG/DL
NRBC # BLD AUTO: 0 10E3/UL
NRBC # BLD AUTO: 0 10E3/UL
NRBC BLD AUTO-RTO: 0 /100
NRBC BLD AUTO-RTO: 0 /100
PATH REPORT.COMMENTS IMP SPEC: ABNORMAL
PATH REPORT.COMMENTS IMP SPEC: NORMAL
PATH REPORT.COMMENTS IMP SPEC: YES
PATH REPORT.FINAL DX SPEC: ABNORMAL
PATH REPORT.FINAL DX SPEC: NORMAL
PATH REPORT.GROSS SPEC: ABNORMAL
PATH REPORT.GROSS SPEC: NORMAL
PATH REPORT.MICROSCOPIC SPEC OTHER STN: ABNORMAL
PATH REPORT.MICROSCOPIC SPEC OTHER STN: NORMAL
PATH REPORT.RELEVANT HX SPEC: ABNORMAL
PATH REPORT.RELEVANT HX SPEC: NORMAL
PHOTO IMAGE: NORMAL
PLATELET # BLD AUTO: 166 10E3/UL (ref 150–450)
PLATELET # BLD AUTO: 180 10E3/UL (ref 150–450)
POTASSIUM SERPL-SCNC: 3.8 MMOL/L (ref 3.4–5.3)
POTASSIUM SERPL-SCNC: 3.9 MMOL/L (ref 3.4–5.3)
POTASSIUM SERPL-SCNC: 3.9 MMOL/L (ref 3.4–5.3)
POTASSIUM SERPL-SCNC: 4.1 MMOL/L (ref 3.4–5.3)
PROT SERPL-MCNC: 6 G/DL (ref 6.4–8.3)
PROT SERPL-MCNC: 6.4 G/DL (ref 6.4–8.3)
PROT SERPL-MCNC: 6.7 G/DL (ref 6.4–8.3)
RBC # BLD AUTO: 3.82 10E6/UL (ref 3.8–5.2)
RBC # BLD AUTO: 4.06 10E6/UL (ref 3.8–5.2)
SODIUM SERPL-SCNC: 128 MMOL/L (ref 136–145)
SODIUM SERPL-SCNC: 138 MMOL/L (ref 135–145)
SODIUM SERPL-SCNC: 139 MMOL/L (ref 136–145)
SODIUM SERPL-SCNC: 141 MMOL/L (ref 136–145)
SPECIMEN EXPIRATION DATE: NORMAL
TRIGL SERPL-MCNC: 51 MG/DL
TSH SERPL DL<=0.005 MIU/L-ACNC: 2.07 UIU/ML (ref 0.3–4.2)
TSH SERPL DL<=0.005 MIU/L-ACNC: 3.74 UIU/ML (ref 0.3–4.2)
WBC # BLD AUTO: 6.1 10E3/UL (ref 4–11)
WBC # BLD AUTO: 7.7 10E3/UL (ref 4–11)

## 2023-01-01 PROCEDURE — C1894 INTRO/SHEATH, NON-LASER: HCPCS | Performed by: SPECIALIST

## 2023-01-01 PROCEDURE — 255N000002 HC RX 255 OP 636: Performed by: SPECIALIST

## 2023-01-01 PROCEDURE — 88300 SURGICAL PATH GROSS: CPT | Mod: 26 | Performed by: PATHOLOGY

## 2023-01-01 PROCEDURE — 250N000011 HC RX IP 250 OP 636: Performed by: EMERGENCY MEDICINE

## 2023-01-01 PROCEDURE — 250N000025 HC SEVOFLURANE, PER MIN: Performed by: SPECIALIST

## 2023-01-01 PROCEDURE — 258N000001 HC RX 258: Performed by: SPECIALIST

## 2023-01-01 PROCEDURE — 258N000003 HC RX IP 258 OP 636: Performed by: NURSE ANESTHETIST, CERTIFIED REGISTERED

## 2023-01-01 PROCEDURE — 88112 CYTOPATH CELL ENHANCE TECH: CPT | Mod: 26 | Performed by: PATHOLOGY

## 2023-01-01 PROCEDURE — 87086 URINE CULTURE/COLONY COUNT: CPT | Performed by: NURSE PRACTITIONER

## 2023-01-01 PROCEDURE — 82435 ASSAY OF BLOOD CHLORIDE: CPT | Performed by: STUDENT IN AN ORGANIZED HEALTH CARE EDUCATION/TRAINING PROGRAM

## 2023-01-01 PROCEDURE — 710N000009 HC RECOVERY PHASE 1, LEVEL 1, PER MIN: Performed by: SPECIALIST

## 2023-01-01 PROCEDURE — 87086 URINE CULTURE/COLONY COUNT: CPT | Performed by: STUDENT IN AN ORGANIZED HEALTH CARE EDUCATION/TRAINING PROGRAM

## 2023-01-01 PROCEDURE — C1769 GUIDE WIRE: HCPCS | Performed by: SPECIALIST

## 2023-01-01 PROCEDURE — 86923 COMPATIBILITY TEST ELECTRIC: CPT | Performed by: INTERNAL MEDICINE

## 2023-01-01 PROCEDURE — 86900 BLOOD TYPING SEROLOGIC ABO: CPT | Performed by: EMERGENCY MEDICINE

## 2023-01-01 PROCEDURE — 85610 PROTHROMBIN TIME: CPT | Performed by: EMERGENCY MEDICINE

## 2023-01-01 PROCEDURE — 74174 CTA ABD&PLVS W/CONTRAST: CPT

## 2023-01-01 PROCEDURE — 85025 COMPLETE CBC W/AUTO DIFF WBC: CPT | Performed by: FAMILY MEDICINE

## 2023-01-01 PROCEDURE — 36415 COLL VENOUS BLD VENIPUNCTURE: CPT | Performed by: HOSPITALIST

## 2023-01-01 PROCEDURE — 258N000003 HC RX IP 258 OP 636: Performed by: ANESTHESIOLOGY

## 2023-01-01 PROCEDURE — 120N000001 HC R&B MED SURG/OB

## 2023-01-01 PROCEDURE — 88305 TISSUE EXAM BY PATHOLOGIST: CPT | Mod: TC | Performed by: SPECIALIST

## 2023-01-01 PROCEDURE — 96374 THER/PROPH/DIAG INJ IV PUSH: CPT

## 2023-01-01 PROCEDURE — 74176 CT ABD & PELVIS W/O CONTRAST: CPT

## 2023-01-01 PROCEDURE — 710N000012 HC RECOVERY PHASE 2, PER MINUTE: Performed by: SPECIALIST

## 2023-01-01 PROCEDURE — 80053 COMPREHEN METABOLIC PANEL: CPT | Performed by: FAMILY MEDICINE

## 2023-01-01 PROCEDURE — 83036 HEMOGLOBIN GLYCOSYLATED A1C: CPT | Performed by: HOSPITALIST

## 2023-01-01 PROCEDURE — C9113 INJ PANTOPRAZOLE SODIUM, VIA: HCPCS | Performed by: EMERGENCY MEDICINE

## 2023-01-01 PROCEDURE — 258N000003 HC RX IP 258 OP 636: Performed by: HOSPITALIST

## 2023-01-01 PROCEDURE — 88300 SURGICAL PATH GROSS: CPT | Mod: TC | Performed by: SPECIALIST

## 2023-01-01 PROCEDURE — 99204 OFFICE O/P NEW MOD 45 MIN: CPT | Performed by: SURGERY

## 2023-01-01 PROCEDURE — 250N000009 HC RX 250: Performed by: NURSE ANESTHETIST, CERTIFIED REGISTERED

## 2023-01-01 PROCEDURE — 84443 ASSAY THYROID STIM HORMONE: CPT | Performed by: FAMILY MEDICINE

## 2023-01-01 PROCEDURE — 85730 THROMBOPLASTIN TIME PARTIAL: CPT | Performed by: EMERGENCY MEDICINE

## 2023-01-01 PROCEDURE — 999N000141 HC STATISTIC PRE-PROCEDURE NURSING ASSESSMENT: Performed by: SPECIALIST

## 2023-01-01 PROCEDURE — 80053 COMPREHEN METABOLIC PANEL: CPT | Performed by: EMERGENCY MEDICINE

## 2023-01-01 PROCEDURE — 99223 1ST HOSP IP/OBS HIGH 75: CPT | Performed by: HOSPITALIST

## 2023-01-01 PROCEDURE — 87081 CULTURE SCREEN ONLY: CPT | Performed by: STUDENT IN AN ORGANIZED HEALTH CARE EDUCATION/TRAINING PROGRAM

## 2023-01-01 PROCEDURE — 80061 LIPID PANEL: CPT | Performed by: FAMILY MEDICINE

## 2023-01-01 PROCEDURE — 36415 COLL VENOUS BLD VENIPUNCTURE: CPT | Performed by: EMERGENCY MEDICINE

## 2023-01-01 PROCEDURE — 99285 EMERGENCY DEPT VISIT HI MDM: CPT | Mod: 25

## 2023-01-01 PROCEDURE — 88305 TISSUE EXAM BY PATHOLOGIST: CPT | Mod: 26 | Performed by: PATHOLOGY

## 2023-01-01 PROCEDURE — 88112 CYTOPATH CELL ENHANCE TECH: CPT | Mod: TC | Performed by: SPECIALIST

## 2023-01-01 PROCEDURE — C2617 STENT, NON-COR, TEM W/O DEL: HCPCS | Performed by: SPECIALIST

## 2023-01-01 PROCEDURE — 87086 URINE CULTURE/COLONY COUNT: CPT | Performed by: FAMILY MEDICINE

## 2023-01-01 PROCEDURE — 250N000011 HC RX IP 250 OP 636: Performed by: SPECIALIST

## 2023-01-01 PROCEDURE — 272N000001 HC OR GENERAL SUPPLY STERILE: Performed by: SPECIALIST

## 2023-01-01 PROCEDURE — 999N000180 XR SURGERY CARM FLUORO LESS THAN 5 MIN

## 2023-01-01 PROCEDURE — 85025 COMPLETE CBC W/AUTO DIFF WBC: CPT | Performed by: EMERGENCY MEDICINE

## 2023-01-01 PROCEDURE — 250N000011 HC RX IP 250 OP 636: Performed by: NURSE ANESTHETIST, CERTIFIED REGISTERED

## 2023-01-01 PROCEDURE — 370N000017 HC ANESTHESIA TECHNICAL FEE, PER MIN: Performed by: SPECIALIST

## 2023-01-01 PROCEDURE — 80053 COMPREHEN METABOLIC PANEL: CPT | Performed by: PHYSICIAN ASSISTANT

## 2023-01-01 PROCEDURE — 250N000009 HC RX 250: Performed by: SPECIALIST

## 2023-01-01 PROCEDURE — 360N000082 HC SURGERY LEVEL 2 W/ FLUORO, PER MIN: Performed by: SPECIALIST

## 2023-01-01 PROCEDURE — 85018 HEMOGLOBIN: CPT | Performed by: HOSPITALIST

## 2023-01-01 PROCEDURE — 86923 COMPATIBILITY TEST ELECTRIC: CPT

## 2023-01-01 DEVICE — URETERAL STENT
Type: IMPLANTABLE DEVICE | Site: URETER | Status: FUNCTIONAL
Brand: PERCUFLEX™ PLUS

## 2023-01-01 RX ORDER — MEPERIDINE HYDROCHLORIDE 25 MG/ML
12.5 INJECTION INTRAMUSCULAR; INTRAVENOUS; SUBCUTANEOUS EVERY 5 MIN PRN
Status: DISCONTINUED | OUTPATIENT
Start: 2023-01-01 | End: 2023-01-01 | Stop reason: HOSPADM

## 2023-01-01 RX ORDER — NAPROXEN 250 MG/1
250 TABLET ORAL EVERY 12 HOURS PRN
Qty: 20 TABLET | Refills: 0 | Status: SHIPPED | OUTPATIENT
Start: 2023-01-01 | End: 2023-01-01

## 2023-01-01 RX ORDER — ONDANSETRON 4 MG/1
4 TABLET, ORALLY DISINTEGRATING ORAL EVERY 30 MIN PRN
Status: DISCONTINUED | OUTPATIENT
Start: 2023-01-01 | End: 2023-01-01 | Stop reason: HOSPADM

## 2023-01-01 RX ORDER — ACETAMINOPHEN 325 MG/1
650 TABLET ORAL ONCE
Status: DISCONTINUED | OUTPATIENT
Start: 2023-01-01 | End: 2023-01-01 | Stop reason: HOSPADM

## 2023-01-01 RX ORDER — MIRTAZAPINE 15 MG/1
15 TABLET, FILM COATED ORAL AT BEDTIME
Status: DISCONTINUED | OUTPATIENT
Start: 2023-01-01 | End: 2024-01-01

## 2023-01-01 RX ORDER — PRIMIDONE 50 MG/1
100 TABLET ORAL 2 TIMES DAILY
COMMUNITY

## 2023-01-01 RX ORDER — GLYCOPYRROLATE 0.2 MG/ML
INJECTION, SOLUTION INTRAMUSCULAR; INTRAVENOUS PRN
Status: DISCONTINUED | OUTPATIENT
Start: 2023-01-01 | End: 2023-01-01

## 2023-01-01 RX ORDER — LIDOCAINE 40 MG/G
CREAM TOPICAL
Status: DISCONTINUED | OUTPATIENT
Start: 2023-01-01 | End: 2024-01-01

## 2023-01-01 RX ORDER — FENTANYL CITRATE 50 UG/ML
50 INJECTION, SOLUTION INTRAMUSCULAR; INTRAVENOUS EVERY 5 MIN PRN
Status: DISCONTINUED | OUTPATIENT
Start: 2023-01-01 | End: 2023-01-01 | Stop reason: HOSPADM

## 2023-01-01 RX ORDER — ALBUTEROL SULFATE 90 UG/1
1-2 AEROSOL, METERED RESPIRATORY (INHALATION) 3 TIMES DAILY
Status: DISCONTINUED | OUTPATIENT
Start: 2023-01-01 | End: 2024-01-01

## 2023-01-01 RX ORDER — PROPOFOL 10 MG/ML
INJECTION, EMULSION INTRAVENOUS PRN
Status: DISCONTINUED | OUTPATIENT
Start: 2023-01-01 | End: 2023-01-01

## 2023-01-01 RX ORDER — LIDOCAINE HYDROCHLORIDE 10 MG/ML
INJECTION, SOLUTION INFILTRATION; PERINEURAL PRN
Status: DISCONTINUED | OUTPATIENT
Start: 2023-01-01 | End: 2023-01-01

## 2023-01-01 RX ORDER — AMOXICILLIN 250 MG
1 CAPSULE ORAL 2 TIMES DAILY PRN
Status: DISCONTINUED | OUTPATIENT
Start: 2023-01-01 | End: 2024-01-01

## 2023-01-01 RX ORDER — FLUTICASONE FUROATE AND VILANTEROL 200; 25 UG/1; UG/1
1 POWDER RESPIRATORY (INHALATION) DAILY
Status: DISCONTINUED | OUTPATIENT
Start: 2024-01-01 | End: 2023-01-01 | Stop reason: ALTCHOICE

## 2023-01-01 RX ORDER — SODIUM CHLORIDE, SODIUM LACTATE, POTASSIUM CHLORIDE, CALCIUM CHLORIDE 600; 310; 30; 20 MG/100ML; MG/100ML; MG/100ML; MG/100ML
INJECTION, SOLUTION INTRAVENOUS CONTINUOUS
Status: DISCONTINUED | OUTPATIENT
Start: 2023-01-01 | End: 2023-01-01 | Stop reason: HOSPADM

## 2023-01-01 RX ORDER — LEVOTHYROXINE SODIUM 25 UG/1
75 TABLET ORAL DAILY
Status: DISCONTINUED | OUTPATIENT
Start: 2024-01-01 | End: 2024-01-01

## 2023-01-01 RX ORDER — ALBUTEROL SULFATE 0.83 MG/ML
2.5 SOLUTION RESPIRATORY (INHALATION) EVERY 6 HOURS PRN
Status: DISCONTINUED | OUTPATIENT
Start: 2023-01-01 | End: 2024-01-01

## 2023-01-01 RX ORDER — PRIMIDONE 50 MG/1
100 TABLET ORAL 2 TIMES DAILY
Status: DISCONTINUED | OUTPATIENT
Start: 2023-01-01 | End: 2024-01-01

## 2023-01-01 RX ORDER — ONDANSETRON 2 MG/ML
INJECTION INTRAMUSCULAR; INTRAVENOUS PRN
Status: DISCONTINUED | OUTPATIENT
Start: 2023-01-01 | End: 2023-01-01

## 2023-01-01 RX ORDER — DEXAMETHASONE SODIUM PHOSPHATE 10 MG/ML
INJECTION, SOLUTION INTRAMUSCULAR; INTRAVENOUS PRN
Status: DISCONTINUED | OUTPATIENT
Start: 2023-01-01 | End: 2023-01-01

## 2023-01-01 RX ORDER — SODIUM CHLORIDE 9 MG/ML
INJECTION, SOLUTION INTRAVENOUS CONTINUOUS
Status: DISCONTINUED | OUTPATIENT
Start: 2023-01-01 | End: 2024-01-01

## 2023-01-01 RX ORDER — ATORVASTATIN CALCIUM 40 MG/1
80 TABLET, FILM COATED ORAL AT BEDTIME
Status: DISCONTINUED | OUTPATIENT
Start: 2023-01-01 | End: 2024-01-01

## 2023-01-01 RX ORDER — FENTANYL CITRATE 50 UG/ML
INJECTION, SOLUTION INTRAMUSCULAR; INTRAVENOUS PRN
Status: DISCONTINUED | OUTPATIENT
Start: 2023-01-01 | End: 2023-01-01

## 2023-01-01 RX ORDER — ACETAMINOPHEN 325 MG/1
650 TABLET ORAL ONCE
Status: DISCONTINUED | OUTPATIENT
Start: 2023-01-01 | End: 2023-01-01

## 2023-01-01 RX ORDER — PREDNISONE 10 MG/1
10 TABLET ORAL PRN
COMMUNITY

## 2023-01-01 RX ORDER — ONDANSETRON 2 MG/ML
4 INJECTION INTRAMUSCULAR; INTRAVENOUS EVERY 30 MIN PRN
Status: DISCONTINUED | OUTPATIENT
Start: 2023-01-01 | End: 2023-01-01 | Stop reason: HOSPADM

## 2023-01-01 RX ORDER — LIDOCAINE 40 MG/G
CREAM TOPICAL
Status: DISCONTINUED | OUTPATIENT
Start: 2023-01-01 | End: 2023-01-01 | Stop reason: HOSPADM

## 2023-01-01 RX ORDER — CEFAZOLIN SODIUM/WATER 2 G/20 ML
2 SYRINGE (ML) INTRAVENOUS
Status: COMPLETED | OUTPATIENT
Start: 2023-01-01 | End: 2023-01-01

## 2023-01-01 RX ORDER — LEVOTHYROXINE SODIUM 75 MCG
75 TABLET ORAL DAILY
COMMUNITY
Start: 2023-01-01

## 2023-01-01 RX ORDER — ACETAMINOPHEN 650 MG/1
650 SUPPOSITORY RECTAL EVERY 4 HOURS PRN
Status: DISCONTINUED | OUTPATIENT
Start: 2023-01-01 | End: 2024-01-01

## 2023-01-01 RX ORDER — IOPAMIDOL 755 MG/ML
75 INJECTION, SOLUTION INTRAVASCULAR ONCE
Status: COMPLETED | OUTPATIENT
Start: 2023-01-01 | End: 2023-01-01

## 2023-01-01 RX ORDER — AMOXICILLIN 250 MG
2 CAPSULE ORAL 2 TIMES DAILY PRN
Status: DISCONTINUED | OUTPATIENT
Start: 2023-01-01 | End: 2024-01-01

## 2023-01-01 RX ORDER — ACETAMINOPHEN 325 MG/1
650 TABLET ORAL EVERY 4 HOURS PRN
Status: DISCONTINUED | OUTPATIENT
Start: 2023-01-01 | End: 2024-01-01

## 2023-01-01 RX ORDER — ALBUTEROL SULFATE 0.83 MG/ML
2.5 SOLUTION RESPIRATORY (INHALATION) EVERY 6 HOURS PRN
COMMUNITY

## 2023-01-01 RX ORDER — NAPROXEN 250 MG/1
250 TABLET ORAL EVERY 12 HOURS PRN
Qty: 20 TABLET | Refills: 0 | Status: SHIPPED | OUTPATIENT
Start: 2023-01-01

## 2023-01-01 RX ORDER — CEFAZOLIN SODIUM/WATER 2 G/20 ML
2 SYRINGE (ML) INTRAVENOUS SEE ADMIN INSTRUCTIONS
Status: DISCONTINUED | OUTPATIENT
Start: 2023-01-01 | End: 2023-01-01 | Stop reason: HOSPADM

## 2023-01-01 RX ORDER — FENTANYL CITRATE 50 UG/ML
25 INJECTION, SOLUTION INTRAMUSCULAR; INTRAVENOUS EVERY 5 MIN PRN
Status: DISCONTINUED | OUTPATIENT
Start: 2023-01-01 | End: 2023-01-01 | Stop reason: HOSPADM

## 2023-01-01 RX ADMIN — SODIUM CHLORIDE, POTASSIUM CHLORIDE, SODIUM LACTATE AND CALCIUM CHLORIDE: 600; 310; 30; 20 INJECTION, SOLUTION INTRAVENOUS at 07:34

## 2023-01-01 RX ADMIN — PHENYLEPHRINE HYDROCHLORIDE 100 MCG: 10 INJECTION INTRAVENOUS at 08:40

## 2023-01-01 RX ADMIN — PHENYLEPHRINE HYDROCHLORIDE 100 MCG: 10 INJECTION INTRAVENOUS at 08:20

## 2023-01-01 RX ADMIN — DEXAMETHASONE SODIUM PHOSPHATE 10 MG: 10 INJECTION, SOLUTION INTRAMUSCULAR; INTRAVENOUS at 08:06

## 2023-01-01 RX ADMIN — SODIUM CHLORIDE, POTASSIUM CHLORIDE, SODIUM LACTATE AND CALCIUM CHLORIDE: 600; 310; 30; 20 INJECTION, SOLUTION INTRAVENOUS at 09:33

## 2023-01-01 RX ADMIN — PROPOFOL 100 MG: 10 INJECTION, EMULSION INTRAVENOUS at 08:09

## 2023-01-01 RX ADMIN — ROCURONIUM BROMIDE 50 MG: 50 INJECTION, SOLUTION INTRAVENOUS at 08:09

## 2023-01-01 RX ADMIN — FENTANYL CITRATE 50 MCG: 50 INJECTION, SOLUTION INTRAMUSCULAR; INTRAVENOUS at 09:07

## 2023-01-01 RX ADMIN — PANTOPRAZOLE SODIUM 40 MG: 40 INJECTION, POWDER, FOR SOLUTION INTRAVENOUS at 13:58

## 2023-01-01 RX ADMIN — Medication 2 G: at 08:04

## 2023-01-01 RX ADMIN — PROPOFOL 200 MG: 10 INJECTION, EMULSION INTRAVENOUS at 08:06

## 2023-01-01 RX ADMIN — IOPAMIDOL 75 ML: 755 INJECTION, SOLUTION INTRAVENOUS at 15:25

## 2023-01-01 RX ADMIN — SUGAMMADEX 110 MG: 100 INJECTION, SOLUTION INTRAVENOUS at 09:32

## 2023-01-01 RX ADMIN — PHENYLEPHRINE HYDROCHLORIDE 100 MCG: 10 INJECTION INTRAVENOUS at 09:15

## 2023-01-01 RX ADMIN — SODIUM CHLORIDE: 9 INJECTION, SOLUTION INTRAVENOUS at 19:35

## 2023-01-01 RX ADMIN — GLYCOPYRROLATE 0.2 MG: 0.2 INJECTION, SOLUTION INTRAMUSCULAR; INTRAVENOUS at 08:06

## 2023-01-01 RX ADMIN — FENTANYL CITRATE 50 MCG: 50 INJECTION, SOLUTION INTRAMUSCULAR; INTRAVENOUS at 08:04

## 2023-01-01 RX ADMIN — Medication 100 MG: at 08:11

## 2023-01-01 RX ADMIN — FENTANYL CITRATE 50 MCG: 50 INJECTION, SOLUTION INTRAMUSCULAR; INTRAVENOUS at 08:10

## 2023-01-01 RX ADMIN — LIDOCAINE HYDROCHLORIDE 30 MG: 10 INJECTION, SOLUTION INFILTRATION; PERINEURAL at 08:05

## 2023-01-01 RX ADMIN — ONDANSETRON 4 MG: 2 INJECTION INTRAMUSCULAR; INTRAVENOUS at 09:31

## 2023-01-01 ASSESSMENT — ACTIVITIES OF DAILY LIVING (ADL)
ADLS_ACUITY_SCORE: 35
ADLS_ACUITY_SCORE: 20
ADLS_ACUITY_SCORE: 35
ADLS_ACUITY_SCORE: 20
ADLS_ACUITY_SCORE: 20

## 2023-01-01 ASSESSMENT — COPD QUESTIONNAIRES: COPD: 1

## 2023-03-06 PROBLEM — N13.30 HYDRONEPHROSIS, LEFT: Status: ACTIVE | Noted: 2023-01-01

## 2023-03-08 NOTE — ANESTHESIA POSTPROCEDURE EVALUATION
Patient: Jessica Reynolds    Procedure: Procedure(s):  CYSTOSCOPY WITH LEFT RETROGRADE PYELOGRAM LEFT URETEROSCOPY, BRUSH AND REGULAR BIOPSY LEFT URETER AND LEFT URETERAL STENT PLACEMENT       Anesthesia Type:  General    Note:  Disposition: Outpatient   Postop Pain Control: Uneventful            Sign Out: Well controlled pain   PONV: No   Neuro/Psych: Uneventful            Sign Out: Acceptable/Baseline neuro status   Airway/Respiratory: Uneventful            Sign Out: Acceptable/Baseline resp. status   CV/Hemodynamics: Uneventful            Sign Out: Acceptable CV status; No obvious hypovolemia; No obvious fluid overload   Other NRE:    DID A NON-ROUTINE EVENT OCCUR?            Last vitals:  Vitals Value Taken Time   /60 03/08/23 1000   Temp 37.2  C (98.9  F) 03/08/23 1000   Pulse 72 03/08/23 1012   Resp 24 03/08/23 1006   SpO2 99 % 03/08/23 1012   Vitals shown include unvalidated device data.    Electronically Signed By: Delia Cadena MD  March 8, 2023  10:58 AM

## 2023-03-08 NOTE — ANESTHESIA CARE TRANSFER NOTE
Patient: Jessica Reynolds    Procedure: Procedure(s):  CYSTOSCOPY WITH LEFT RETROGRADE PYELOGRAM LEFT URETEROSCOPY, BRUSH AND REGULAR BIOPSY LEFT URETER AND LEFT URETERAL STENT PLACEMENT       Diagnosis: Ureter filling defect [R93.41]  Abnormal radiologic findings on diagnostic imaging of renal pelvis, ureter, or bladder [R93.41]  Diagnosis Additional Information: No value filed.    Anesthesia Type:   General     Note:    Oropharynx: oropharynx clear of all foreign objects  Level of Consciousness: drowsy  Oxygen Supplementation: face mask  Level of Supplemental Oxygen (L/min / FiO2): 6  Independent Airway: airway patency satisfactory and stable  Dentition: dentition unchanged  Vital Signs Stable: post-procedure vital signs reviewed and stable  Report to RN Given: handoff report given  Patient transferred to: PACU    Handoff Report: Identifed the Patient, Identified the Reponsible Provider, Reviewed the pertinent medical history, Discussed the surgical course, Reviewed Intra-OP anesthesia mangement and issues during anesthesia, Set expectations for post-procedure period and Allowed opportunity for questions and acknowledgement of understanding      Vitals:  Vitals Value Taken Time   /74 03/08/23 0940   Temp 100.3    Pulse 80 03/08/23 0941   Resp 16 03/08/23 0941   SpO2 98    Vitals shown include unvalidated device data.    Electronically Signed By: PRANAV Castle CRNA  March 8, 2023  9:42 AM

## 2023-03-08 NOTE — ANESTHESIA PREPROCEDURE EVALUATION
Anesthesia Pre-Procedure Evaluation    Patient: Jessica Reynolds   MRN: [id number] : 1945        Procedure : Procedure(s):  CYSTOSCOPY WITH LEFT RETROGRADE PYELOGRAM LEFT URETEROSCOPY, BRUSH AND REGULAR BIOPSY LEFT URETER AND LEFT URETERAL STENT PLACEMENT          Past Medical History:   Diagnosis Date     Anxiety      Arthritis      Bladder mass      Carotid stenosis     right     COPD (chronic obstructive pulmonary disease) (H)      Essential tremor      Hepatic steatosis      History of rectal cancer      Hypertension      Macular degeneration (senile) of retina      Osteoporosis      Rosacea      Thyroid disease      Vitamin D deficiency       Past Surgical History:   Procedure Laterality Date     APPENDECTOMY       EYE SURGERY        Allergies   Allergen Reactions     Bactrim [Sulfamethoxazole W/Trimethoprim] Nausea and Vomiting and Diarrhea     Critically high       Sudafed [Pseudoephedrine] Dizziness     Light headed      Social History     Tobacco Use     Smoking status: Every Day     Packs/day: 0.50     Types: Cigarettes     Smokeless tobacco: Not on file     Tobacco comments:     4 cigarettes in the past 2 weeks   Substance Use Topics     Alcohol use: Yes     Alcohol/week: 2.0 standard drinks     Types: 2 Standard drinks or equivalent per week     Comment: N/A beer every other day, zaid 3-5 drinks er week      Wt Readings from Last 1 Encounters:   23 56.3 kg (124 lb 1.6 oz)        Anesthesia Evaluation   Pt has had prior anesthetic.     No history of anesthetic complications       ROS/MED HX  ENT/Pulmonary:     (+) COPD,     Neurologic:       Cardiovascular:     (+) hypertension----- (-) wheezes   METS/Exercise Tolerance:     Hematologic:       Musculoskeletal:       GI/Hepatic:  - neg GI/hepatic ROS   (+) liver disease,     Renal/Genitourinary:     (+) renal disease, type: CRI,     Endo:     (+) thyroid problem,     Psychiatric/Substance Use:       Infectious Disease:       Malignancy:        Other:            Physical Exam    Airway        Mallampati: II   TM distance: > 3 FB   Neck ROM: limited   Mouth opening: > 3 cm    Respiratory Devices and Support         Dental       (+) Multiple crowns, permanant bridges      Cardiovascular          Rhythm and rate: regular and normal     Pulmonary           (+) decreased breath sounds   (-) no wheezes        OUTSIDE LABS:  CBC:   Lab Results   Component Value Date    WBC 6.1 02/20/2023    WBC 6.9 10/14/2020    HGB 13.7 02/20/2023    HGB 14.3 10/14/2020    HCT 41.1 02/20/2023    HCT 43.1 10/14/2020     02/20/2023     10/14/2020     BMP:   Lab Results   Component Value Date     02/20/2023     (L) 01/29/2023    POTASSIUM 3.9 02/20/2023    POTASSIUM 3.8 01/29/2023    CHLORIDE 104 02/20/2023    CHLORIDE 94 (L) 01/29/2023    CO2 24 02/20/2023    CO2 19 (L) 01/29/2023    BUN 11.7 02/20/2023    BUN 9.0 01/29/2023    CR 0.78 02/20/2023    CR 0.86 01/29/2023     (H) 02/20/2023    GLC 90 01/29/2023     COAGS: No results found for: PTT, INR, FIBR  POC: No results found for: BGM, HCG, HCGS  HEPATIC:   Lab Results   Component Value Date    ALBUMIN 4.2 02/20/2023    PROTTOTAL 6.7 02/20/2023    ALT 30 02/20/2023    AST 55 (H) 02/20/2023    ALKPHOS 119 (H) 02/20/2023    BILITOTAL 0.6 02/20/2023     OTHER:   Lab Results   Component Value Date    JERI 9.8 02/20/2023    TSH 2.07 02/20/2023       Anesthesia Plan    ASA Status:  3   NPO Status:  NPO Appropriate    Anesthesia Type: General.     - Airway: LMA              Consents    Anesthesia Plan(s) and associated risks, benefits, and realistic alternatives discussed. Questions answered and patient/representative(s) expressed understanding.     - Discussed: Risks, Benefits and Alternatives for BOTH SEDATION and the PROCEDURE were discussed     - Discussed with:  Patient         Postoperative Care            Comments:                Delia Cadena MD

## 2023-03-08 NOTE — H&P
The pre-operative history and physical examination was reviewed and the patient examined. There are no changes to be made to the history and physical examination.    Suresh Samuels M.D.  Pager [phone]  3/8/2023 7:48 AM    [facility], [address], [address]  [place] [address]  Ph: [phone]  Fax: [phone]

## 2023-03-08 NOTE — ANESTHESIA PROCEDURE NOTES
Airway       Patient location during procedure: OR       Procedure Start/Stop Times: 3/8/2023 8:12 AM  Staff -        CRNA: Amy Castillo APRN CRNA       Performed By: CRNA  Consent for Airway        Urgency: elective  Indications and Patient Condition       Indications for airway management: alessandro-procedural       Induction type:intravenous       Mask difficulty assessment: 1 - vent by mask    Final Airway Details       Final airway type: endotracheal airway       Successful airway: ETT - single  Endotracheal Airway Details        ETT size (mm): 7.0       Cuffed: yes       Successful intubation technique: video laryngoscopy       VL Blade Size: MAC 3       Grade View of Cords: 1       Adjucts: stylet       Position: Right       Measured from: lips       Secured at (cm): 20       Bite block used: None    Post intubation assessment        Placement verified by: capnometry, equal breath sounds and chest rise        Number of attempts at approach: 1       Number of other approaches attempted: 1       Secured with: silk tape       Ease of procedure: easy       Dentition: Intact and Unchanged       Dental guard used and removed. Dental Guard Type: Proguard Red.    Medication(s) Administered   Medication Administration Time: 3/8/2023 8:12 AM

## 2023-03-08 NOTE — OP NOTE
Operative note:    Procedure:    1.  Cystoscopy with left retrograde pyelogram, left ureteroscopy, biopsy of left ureteral tumor and left ureteral stent placement.    Surgeon:    Suresh Samuels MD    Anesthesia:    General    Preoperative diagnosis:    1.  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    Postoperative diagnosis:    Same    The patient was brought to the operating suite where satisfactory general anesthesia was induced.  She was placed in the dorsal lithotomy position on the operating table and sterilely prepped and draped.  A timeout was called.    A 22 Syriac Stortz cystoscope was advanced per urethra into the bladder without difficulty.  The bladder was grossly normal.  Both ureteral orifices were identified.    Next I inserted a #6 open-ended ureteral catheter and attempted to obtain left ureteral washing for cytology but could not aspirate any fluid.  I then attempted to advance the ureteral catheter more proximally but encountered significant obstruction at the level of the pelvic inlet.  I was then able to introduce 8.035 angled Glidewire beyond the obstructing area all the way up into the left kidney.  The ureteral catheter was then advanced over the Glidewire and after this I inserted about 10 to 20 cc of sterile saline into the collecting system and aspirated fluid back which was sent to the lab for cytology.    Contrast was then injected through the catheter outlining the left collecting system which showed a soft tissue radiolucent mass at the level of the pelvic inlet extending about 3 cm distally toward the bladder.  The remaining portion of the left collecting system was normal.  No additional filling defects or tumors were identified.    With some difficulty, I then inserted a rigid ureteroscope per urethra into the bladder up alongside the guidewire about 4 to 5 cm.  At this point I encountered a soft tissue mass tumor within  the left ureter that looked very suspicious for transitional cell carcinoma.  Using a Piranha device I was able to take a small biopsy sample from this tumor under direct vision and also used a brush biopsy to biopsy this mass.  The specimens were both sent to pathology for analysis.    The guidewire was then backloaded onto the cystoscope and a 6 x 26 double-J stent advanced up the guidewire via the Seldinger technique into the left renal pelvis under fluoroscopic guidance.  The stent was confirmed to be in good position.  The dangle string was cut from the stent and the guidewire withdrawn.    The cystoscope was withdrawn after the bladder was drained.    EBL 0 mL.  There were no complications.  The patient was awakened and sent to PACU in satisfactory condition.      Suresh Samuels M.D.  March 8, 2023  Pager [phone]  Cell: [phone]

## 2023-08-07 NOTE — LETTER
8/7/2023         RE: Jessica Reynolds  [address]  [address]  [facility] [address]        Dear Colleague,    Thank you for referring your patient, Jessica Reynolds, to the [facility] [place]. Please see a copy of my visit note below.      HPI:  Jessica Ryenolds is a 77 year old female who was referred to me by Shaylee Jacques for a ventral hernia.  She presents with complaints of a bulge in the epigastric region with increasing size and intermittent dicomfort.   She has noted this for the past several years and states that the hernia is enlarging.  She denies any nausea, vomiting, fevers, chills, bloody bowel movements or any other complaints at this time. Previous surgeries include laparotomy for small intestinal issue, likely bowel obstruction.    Allergies:Bactrim [sulfamethoxazole-trimethoprim] and Sudafed [pseudoephedrine]    Past Medical History:   Diagnosis Date     Anxiety      Arthritis      Bladder mass      Carotid stenosis     right     COPD (chronic obstructive pulmonary disease) (H)      Essential tremor      Hepatic steatosis      History of rectal cancer      Hypertension      Macular degeneration (senile) of retina      Osteoporosis      Rosacea      Thyroid disease      Vitamin D deficiency        Past Surgical History:   Procedure Laterality Date     APPENDECTOMY       COMBINED CYSTOSCOPY, RETROGRADES, EXCHANGE STENT URETER(S) Left 3/8/2023    Procedure: CYSTOSCOPY WITH LEFT RETROGRADE PYELOGRAM LEFT URETEROSCOPY, BRUSH AND REGULAR BIOPSY LEFT URETER AND LEFT URETERAL STENT PLACEMENT;  Surgeon: Suresh Samuels MD;  [place] [facility] [place]     EYE SURGERY         CURRENT MEDS:  Current Outpatient Medications   Medication Sig Dispense Refill     albuterol (PROAIR HFA) 90 mcg/actuation inhaler [ALBUTEROL (PROAIR HFA) 90 MCG/ACTUATION INHALER] 2 puff(s)       albuterol (PROVENTIL) (2.5 MG/3ML) 0.083% neb solution Take 2.5 mg by nebulization every 6  hours as needed for shortness of breath, wheezing or cough       amLODIPine-benazepril (LOTREL) 10-20 mg per capsule [AMLODIPINE-BENAZEPRIL (LOTREL) 10-20 MG PER CAPSULE] 1 cap(s)       atorvastatin (LIPITOR) 80 MG tablet [ATORVASTATIN (LIPITOR) 80 MG TABLET] 1 tab(s)       CHOLECALCIFEROL, VITAMIN D3, ORAL [CHOLECALCIFEROL, VITAMIN D3, ORAL] daily.       mirtazapine (REMERON) 15 MG tablet 15 mg daily       mometasone-formoterol (DULERA) 200-5 mcg/actuation HFAA inhaler Inhale 2 puffs into the lungs daily       Multiple Vitamins-Minerals (PRESERVISION AREDS PO) Take 1 tablet by mouth daily       naproxen (NAPROSYN) 250 MG tablet Take 1 tablet (250 mg) by mouth every 12 hours as needed for other (Inflammatory pain) 20 tablet 0     omega-3-dha-epa-dpa-fish oil (OMEGA-3 2100) 1,050-1,200 mg cap 1 capsule daily       prednisoLONE acetate (PRED MILD) 0.12 % ophthalmic suspension Place 1 drop into both eyes daily       predniSONE (DELTASONE) 10 MG tablet Take 10 mg by mouth as needed 1-2 tabs orally once a day as needed for COPD flare up for 7 days.       primidone (MYSOLINE) 50 MG tablet Take 100 mg by mouth 2 times daily       sertraline (ZOLOFT) 50 MG tablet        SYNTHROID 75 MCG tablet        vitamin C-biotin (HAIR-SKIN-NAILS, VIT C-BIOTIN,) 50 mg -1,250 mcg Chew [VITAMIN C-BIOTIN (HAIR-SKIN-NAILS, VIT C-BIOTIN,) 50 MG -1,250 MCG CHEW] 3 cap(s)         Family History   Problem Relation Age of Onset     Cerebrovascular Disease Mother      Heart Disease Father      Cerebrovascular Disease Father      Lung Cancer Sister      Diabetes Maternal Grandmother      Substance Abuse Sister         reports that she has been smoking cigarettes. She has been smoking an average of .5 packs per day. She does not have any smokeless tobacco history on file. She reports current alcohol use of about 2.0 standard drinks of alcohol per week. She reports that she does not use drugs.    Review of Systems -   The 12 point review of systems   is within normal limits except for as mentioned above in the HPI.  General ROS: No complaints or constitutional symptoms  Ophthalmic ROS: No complaints of visual changes  Skin: No complaints or symptoms   Endocrine: No complaints or symptoms  Hematologic/Lymphatic: No symptoms or complaints  Psychiatric: No symptoms or complaints  Respiratory ROS: no cough, shortness of breath, or wheezing  Cardiovascular ROS: no chest pain or dyspnea on exertion  Gastrointestinal ROS: As per HPI  Genito-Urinary ROS: no dysuria, trouble voiding, or hematuria  Musculoskeletal ROS: no joint or muscle pain  Neurological ROS: no TIA or stroke symptoms      There were no vitals taken for this visit.  There is no height or weight on file to calculate BMI.    EXAM:  GENERAL: Well developed female  HEENT: Extra ocular muscles intact, pupils are round and reactive, sclera is anicteric,   NECK:  No obvious masses or deformities  ABDOMEN: Soft, palpable reducible 5 cm incisional hernia in the epigastric region  NEURO: No obvious defects noted.  EXT: No edema, no obvious deformities or any other abnormalities    IMAGES: CT images from January of this year demonstrate small incisional hernia    Assessment/Plan:    Jessica Reynolds is a 77 year old female with an incisional hernia.  The pathophysiology of these hernias was discussed as were the surgical and non-operative management strategies.      The risks of surgery were discussed which include, but are not limited to, bleeding, infection, recurrent hernia, chronic pain, poor cosmesis, blood clots, stroke, heart attack and death.  Additionally, the risks of observation were discussed which include, but are not limited to, enlargement of the hernia, incarceration, strangulation, pain and death.  Surgical options including open, laparoscopic and robotic hernia repair were discussed in detail.      She understands everything which was discussed and has consented to proceed with surgery.  We  will therefore schedule robotic repair of the hernia if she so desires.  She has a CT from her urologist pending as well as a surgery for her tear ducts in the near future which she would like to have done prior to fixing her hernia.  She will follow-up with me if she would like to entertain surgery to close her hernia.      Brian Jones D.O., FACS  [phone]  [facility] Department of Surgery      Again, thank you for allowing me to participate in the care of your patient.        Sincerely,        Gavin Jones, DO

## 2023-08-07 NOTE — PROGRESS NOTES
HPI:  Jessica Reynolds is a 77 year old female who was referred to me by Shaylee Jacques for a ventral hernia.  She presents with complaints of a bulge in the epigastric region with increasing size and intermittent dicomfort.   She has noted this for the past several years and states that the hernia is enlarging.  She denies any nausea, vomiting, fevers, chills, bloody bowel movements or any other complaints at this time. Previous surgeries include laparotomy for small intestinal issue, likely bowel obstruction.    Allergies:Bactrim [sulfamethoxazole-trimethoprim] and Sudafed [pseudoephedrine]    Past Medical History:   Diagnosis Date    Anxiety     Arthritis     Bladder mass     Carotid stenosis     right    COPD (chronic obstructive pulmonary disease) (H)     Essential tremor     Hepatic steatosis     History of rectal cancer     Hypertension     Macular degeneration (senile) of retina     Osteoporosis     Rosacea     Thyroid disease     Vitamin D deficiency        Past Surgical History:   Procedure Laterality Date    APPENDECTOMY      COMBINED CYSTOSCOPY, RETROGRADES, EXCHANGE STENT URETER(S) Left 3/8/2023    Procedure: CYSTOSCOPY WITH LEFT RETROGRADE PYELOGRAM LEFT URETEROSCOPY, BRUSH AND REGULAR BIOPSY LEFT URETER AND LEFT URETERAL STENT PLACEMENT;  Surgeon: Suresh Samuels MD;  Location: [facility] OR    EYE SURGERY         CURRENT MEDS:  Current Outpatient Medications   Medication Sig Dispense Refill    albuterol (PROAIR HFA) 90 mcg/actuation inhaler [ALBUTEROL (PROAIR HFA) 90 MCG/ACTUATION INHALER] 2 puff(s)      albuterol (PROVENTIL) (2.5 MG/3ML) 0.083% neb solution Take 2.5 mg by nebulization every 6 hours as needed for shortness of breath, wheezing or cough      amLODIPine-benazepril (LOTREL) 10-20 mg per capsule [AMLODIPINE-BENAZEPRIL (LOTREL) 10-20 MG PER CAPSULE] 1 cap(s)      atorvastatin (LIPITOR) 80 MG tablet [ATORVASTATIN (LIPITOR) 80 MG TABLET] 1 tab(s)      CHOLECALCIFEROL, VITAMIN D3,  ORAL [CHOLECALCIFEROL, VITAMIN D3, ORAL] daily.      mirtazapine (REMERON) 15 MG tablet 15 mg daily      mometasone-formoterol (DULERA) 200-5 mcg/actuation HFAA inhaler Inhale 2 puffs into the lungs daily      Multiple Vitamins-Minerals (PRESERVISION AREDS PO) Take 1 tablet by mouth daily      naproxen (NAPROSYN) 250 MG tablet Take 1 tablet (250 mg) by mouth every 12 hours as needed for other (Inflammatory pain) 20 tablet 0    omega-3-dha-epa-dpa-fish oil (OMEGA-3 2100) 1,050-1,200 mg cap 1 capsule daily      prednisoLONE acetate (PRED MILD) 0.12 % ophthalmic suspension Place 1 drop into both eyes daily      predniSONE (DELTASONE) 10 MG tablet Take 10 mg by mouth as needed 1-2 tabs orally once a day as needed for COPD flare up for 7 days.      primidone (MYSOLINE) 50 MG tablet Take 100 mg by mouth 2 times daily      sertraline (ZOLOFT) 50 MG tablet       SYNTHROID 75 MCG tablet       vitamin C-biotin (HAIR-SKIN-NAILS, VIT C-BIOTIN,) 50 mg -1,250 mcg Chew [VITAMIN C-BIOTIN (HAIR-SKIN-NAILS, VIT C-BIOTIN,) 50 MG -1,250 MCG CHEW] 3 cap(s)         Family History   Problem Relation Age of Onset    Cerebrovascular Disease Mother     Heart Disease Father     Cerebrovascular Disease Father     Lung Cancer Sister     Diabetes Maternal Grandmother     Substance Abuse Sister         reports that she has been smoking cigarettes. She has been smoking an average of .5 packs per day. She does not have any smokeless tobacco history on file. She reports current alcohol use of about 2.0 standard drinks of alcohol per week. She reports that she does not use drugs.    Review of Systems -   The 12 point review of systems  is within normal limits except for as mentioned above in the HPI.  General ROS: No complaints or constitutional symptoms  Ophthalmic ROS: No complaints of visual changes  Skin: No complaints or symptoms   Endocrine: No complaints or symptoms  Hematologic/Lymphatic: No symptoms or complaints  Psychiatric: No symptoms or  complaints  Respiratory ROS: no cough, shortness of breath, or wheezing  Cardiovascular ROS: no chest pain or dyspnea on exertion  Gastrointestinal ROS: As per HPI  Genito-Urinary ROS: no dysuria, trouble voiding, or hematuria  Musculoskeletal ROS: no joint or muscle pain  Neurological ROS: no TIA or stroke symptoms      There were no vitals taken for this visit.  There is no height or weight on file to calculate BMI.    EXAM:  GENERAL: Well developed female  HEENT: Extra ocular muscles intact, pupils are round and reactive, sclera is anicteric,   NECK:  No obvious masses or deformities  ABDOMEN: Soft, palpable reducible 5 cm incisional hernia in the epigastric region  NEURO: No obvious defects noted.  EXT: No edema, no obvious deformities or any other abnormalities    IMAGES: CT images from January of this year demonstrate small incisional hernia    Assessment/Plan:    Jessica Reynolds is a 77 year old female with an incisional hernia.  The pathophysiology of these hernias was discussed as were the surgical and non-operative management strategies.      The risks of surgery were discussed which include, but are not limited to, bleeding, infection, recurrent hernia, chronic pain, poor cosmesis, blood clots, stroke, heart attack and death.  Additionally, the risks of observation were discussed which include, but are not limited to, enlargement of the hernia, incarceration, strangulation, pain and death.  Surgical options including open, laparoscopic and robotic hernia repair were discussed in detail.      She understands everything which was discussed and has consented to proceed with surgery.  We will therefore schedule robotic repair of the hernia if she so desires.  She has a CT from her urologist pending as well as a surgery for her tear ducts in the near future which she would like to have done prior to fixing her hernia.  She will follow-up with me if she would like to entertain surgery to close her hernia.       Brian Jones D.O. [facility]  [phone]  [facility] Department of Surgery

## 2023-12-31 PROBLEM — K62.5 BRBPR (BRIGHT RED BLOOD PER RECTUM): Status: ACTIVE | Noted: 2023-01-01

## 2023-12-31 NOTE — PHARMACY-ADMISSION MEDICATION HISTORY
Pharmacy Intern Admission Medication History    Admission medication history is complete. The information provided in this note is only as accurate as the sources available at the time of the update.    Information Source(s): Patient and CareEverywhere/SureScripts via in-person    Pertinent Information: Patient uses ProAir 2-3 times daily.     Changes made to PTA medication list:  Added: None  Deleted: Prednisolone eye drops  Changed:   Preservision - every day -> BID    Medication Affordability:  Not including over the counter (OTC) medications, was there a time in the past 3 months when you did not take your medications as prescribed because of cost?: No    Allergies reviewed with patient and updates made in EHR: yes    Medication History Completed By: Victorino Black 12/31/2023 2:20 PM    PTA Med List   Medication Sig Last Dose    albuterol (PROAIR HFA) 90 mcg/actuation inhaler Inhale 1-2 puffs into the lungs 3 times daily 12/31/2023 at am    albuterol (PROVENTIL) (2.5 MG/3ML) 0.083% neb solution Take 2.5 mg by nebulization every 6 hours as needed for shortness of breath, wheezing or cough Past Month    amLODIPine-benazepril (LOTREL) 10-20 mg per capsule Take 1 capsule by mouth daily 12/31/2023 at am    atorvastatin (LIPITOR) 80 MG tablet Take 80 mg by mouth at bedtime 12/30/2023 at hs    CHOLECALCIFEROL, VITAMIN D3, ORAL Take 2,000 Units by mouth daily 12/31/2023 at am    mirtazapine (REMERON) 15 MG tablet Take 15 mg by mouth at bedtime 12/30/2023 at hs    mometasone-formoterol (DULERA) 200-5 mcg/actuation HFAA inhaler Inhale 2 puffs into the lungs at bedtime 12/30/2023 at hs    Multiple Vitamins-Minerals (PRESERVISION AREDS PO) Take 2 tablets by mouth daily 12/31/2023 at am    naproxen (NAPROSYN) 250 MG tablet Take 1 tablet (250 mg) by mouth every 12 hours as needed for other (Inflammatory pain) Past Month    omega-3-dha-epa-dpa-fish oil (OMEGA-3 2100) 1,050-1,200 mg cap Take 1 capsule by mouth daily  12/31/2023 at am    predniSONE (DELTASONE) 10 MG tablet Take 10 mg by mouth as needed 1-2 tabs orally once a day as needed for COPD flare up for 7 days. Past Week    primidone (MYSOLINE) 50 MG tablet Take 100 mg by mouth 2 times daily 12/31/2023 at am    sertraline (ZOLOFT) 50 MG tablet Take 50 mg by mouth daily 12/31/2023 at am    SYNTHROID 75 MCG tablet Take 75 mcg by mouth daily 12/31/2023 at am    vitamin C-biotin (HAIR-SKIN-NAILS, VIT C-BIOTIN,) 50 mg -1,250 mcg Chew Take 1 capsule by mouth daily 12/31/2023 at am

## 2023-12-31 NOTE — ED TRIAGE NOTES
Black and Bright red bloody stools with clots starting this morning. X3 today so far. Denies dizziness. Years ago had thickened vein in bowel that was bleeding, had emergent surgery at that time. No on thinners.

## 2023-12-31 NOTE — ED NOTES
Pt reports hx of requiring surgical intervention related to a GI blled. Pt reports three  bloody stools starting today. Family is at bedside

## 2023-12-31 NOTE — ED NOTES
Patient had another BM. 400 mL's at this time for a total of 500 mLs bright red liquid stool since arrival to the emergency department.

## 2023-12-31 NOTE — ED NOTES
"[facility] ED Handoff Report    ED Chief Complaint: Bright red blood per rectum    ED Diagnosis:  (K62.5) BRBPR (bright red blood per rectum)  Comment: Black and Bright red bloody stools with clots starting this morning. X3 today PTA. Reports fatigue but denies dizziness. Since arrival, patient has had total of 500 mLs output of liquid bright red stool.  Plan: GI consult, admit       PMH:    Past Medical History:   Diagnosis Date    Anxiety     Arthritis     Bladder mass     Carotid stenosis     right    COPD (chronic obstructive pulmonary disease) (H)     Essential tremor     Hepatic steatosis     History of rectal cancer     Hypertension     Macular degeneration (senile) of retina     Osteoporosis     Rosacea     Thyroid disease     Vitamin D deficiency         Code Status:  No Order     Falls Risk: Yes Band: Applied    Current Living Situation/Residence: lives with their son or daughter     Elimination Status: Continent: Yes     Activity Level: SBA    Patients Preferred Language:  English     Needed: No    Vital Signs:  /62   Pulse 74   Temp 98.2  F (36.8  C) (Oral)   Resp 24   Ht 1.575 m (5' 2\")   Wt 55.8 kg (123 lb)   SpO2 96%   BMI 22.50 kg/m       Pain Score: denies pain    Is the Patient Confused:  No    Last Food or Drink: 12/31/23 at 1100 had biscuit with peanut butter and jelly. Water while here.     Focused Assessment:  Liquid bright red blood per rectum. Denies abdominal pain. Denies dizziness. Reports fatigue.     Tests Performed: Done: Labs and Imaging    Family Dynamics/Concerns: No    Family Updated On Visitor Policy: Yes    Plan of Care Communicated to Family: Yes    Who Was Updated about Plan of Care: Daughter Isabelle whom patient lives with was updated at [phone]    Belongings Checklist Done and Signed by Patient: No. Belongings done but not signed by patient    Medications sent with patient: personal inhaler along with other belongings    Additional " Information: call with questions    RN: Sherin Rhodes RN   12/31/2023 5:46 PM

## 2024-01-01 ENCOUNTER — ANESTHESIA (OUTPATIENT)
Dept: SURGERY | Facility: HOSPITAL | Age: 79
DRG: 329 | End: 2024-01-01
Payer: COMMERCIAL

## 2024-01-01 ENCOUNTER — ANESTHESIA EVENT (OUTPATIENT)
Dept: SURGERY | Facility: HOSPITAL | Age: 79
DRG: 329 | End: 2024-01-01
Payer: COMMERCIAL

## 2024-01-01 ENCOUNTER — APPOINTMENT (OUTPATIENT)
Dept: RADIOLOGY | Facility: HOSPITAL | Age: 79
DRG: 329 | End: 2024-01-01
Payer: COMMERCIAL

## 2024-01-01 ENCOUNTER — APPOINTMENT (OUTPATIENT)
Dept: CT IMAGING | Facility: HOSPITAL | Age: 79
DRG: 329 | End: 2024-01-01
Attending: INTERNAL MEDICINE
Payer: COMMERCIAL

## 2024-01-01 ENCOUNTER — APPOINTMENT (OUTPATIENT)
Dept: RADIOLOGY | Facility: HOSPITAL | Age: 79
DRG: 329 | End: 2024-01-01
Attending: STUDENT IN AN ORGANIZED HEALTH CARE EDUCATION/TRAINING PROGRAM
Payer: COMMERCIAL

## 2024-01-01 ENCOUNTER — APPOINTMENT (OUTPATIENT)
Dept: ULTRASOUND IMAGING | Facility: HOSPITAL | Age: 79
DRG: 329 | End: 2024-01-01
Attending: STUDENT IN AN ORGANIZED HEALTH CARE EDUCATION/TRAINING PROGRAM
Payer: COMMERCIAL

## 2024-01-01 ENCOUNTER — APPOINTMENT (OUTPATIENT)
Dept: INTERVENTIONAL RADIOLOGY/VASCULAR | Facility: HOSPITAL | Age: 79
DRG: 329 | End: 2024-01-01
Attending: RADIOLOGY
Payer: COMMERCIAL

## 2024-01-01 ENCOUNTER — APPOINTMENT (OUTPATIENT)
Dept: RADIOLOGY | Facility: HOSPITAL | Age: 79
DRG: 329 | End: 2024-01-01
Attending: INTERNAL MEDICINE
Payer: COMMERCIAL

## 2024-01-01 VITALS
HEART RATE: 97 BPM | BODY MASS INDEX: 22.31 KG/M2 | OXYGEN SATURATION: 71 % | RESPIRATION RATE: 16 BRPM | SYSTOLIC BLOOD PRESSURE: 100 MMHG | TEMPERATURE: 98 F | WEIGHT: 121.25 LBS | DIASTOLIC BLOOD PRESSURE: 54 MMHG | HEIGHT: 62 IN

## 2024-01-01 LAB
ABO/RH(D): NORMAL
ALBUMIN SERPL BCG-MCNC: 1.9 G/DL (ref 3.5–5.2)
ALBUMIN UR-MCNC: 20 MG/DL
ALP SERPL-CCNC: 46 U/L (ref 40–150)
ALT SERPL W P-5'-P-CCNC: 9 U/L (ref 0–50)
ANION GAP SERPL CALCULATED.3IONS-SCNC: 11 MMOL/L (ref 7–15)
ANION GAP SERPL CALCULATED.3IONS-SCNC: 13 MMOL/L (ref 7–15)
ANION GAP SERPL CALCULATED.3IONS-SCNC: 7 MMOL/L (ref 7–15)
ANION GAP SERPL CALCULATED.3IONS-SCNC: 7 MMOL/L (ref 7–15)
ANION GAP SERPL CALCULATED.3IONS-SCNC: 8 MMOL/L (ref 7–15)
ANION GAP SERPL CALCULATED.3IONS-SCNC: 8 MMOL/L (ref 7–15)
ANION GAP SERPL CALCULATED.3IONS-SCNC: 9 MMOL/L (ref 7–15)
ANTIBODY SCREEN: NEGATIVE
APPEARANCE UR: CLEAR
AST SERPL W P-5'-P-CCNC: 53 U/L (ref 0–45)
ATRIAL RATE - MUSE: 69 BPM
ATRIAL RATE - MUSE: 74 BPM
BACTERIA UR CULT: ABNORMAL
BASE EXCESS BLDA CALC-SCNC: -7.9 MMOL/L
BASE EXCESS BLDV CALC-SCNC: -9.1 MMOL/L
BASE EXCESS BLDV CALC-SCNC: 4.7 MMOL/L
BILIRUB SERPL-MCNC: 0.6 MG/DL
BILIRUB UR QL STRIP: NEGATIVE
BLD PROD TYP BPU: NORMAL
BLOOD COMPONENT TYPE: NORMAL
BUN SERPL-MCNC: 11.2 MG/DL (ref 8–23)
BUN SERPL-MCNC: 11.8 MG/DL (ref 8–23)
BUN SERPL-MCNC: 14 MG/DL (ref 8–23)
BUN SERPL-MCNC: 14.5 MG/DL (ref 8–23)
BUN SERPL-MCNC: 16.1 MG/DL (ref 8–23)
BUN SERPL-MCNC: 21 MG/DL (ref 8–23)
BUN SERPL-MCNC: 26.5 MG/DL (ref 8–23)
BUN SERPL-MCNC: 26.9 MG/DL (ref 8–23)
BUN SERPL-MCNC: 28.6 MG/DL (ref 8–23)
CALCIUM SERPL-MCNC: 7.2 MG/DL (ref 8.8–10.2)
CALCIUM SERPL-MCNC: 7.4 MG/DL (ref 8.8–10.2)
CALCIUM SERPL-MCNC: 7.4 MG/DL (ref 8.8–10.2)
CALCIUM SERPL-MCNC: 7.5 MG/DL (ref 8.8–10.2)
CALCIUM SERPL-MCNC: 7.7 MG/DL (ref 8.8–10.2)
CALCIUM SERPL-MCNC: 7.7 MG/DL (ref 8.8–10.2)
CALCIUM SERPL-MCNC: 7.9 MG/DL (ref 8.8–10.2)
CALCIUM SERPL-MCNC: 8 MG/DL (ref 8.8–10.2)
CALCIUM SERPL-MCNC: 8.9 MG/DL (ref 8.8–10.2)
CALCIUM, IONIZED MEASURED: 1.06 MMOL/L (ref 1.11–1.3)
CHLORIDE SERPL-SCNC: 109 MMOL/L (ref 98–107)
CHLORIDE SERPL-SCNC: 110 MMOL/L (ref 98–107)
CHLORIDE SERPL-SCNC: 110 MMOL/L (ref 98–107)
CHLORIDE SERPL-SCNC: 111 MMOL/L (ref 98–107)
CHLORIDE SERPL-SCNC: 112 MMOL/L (ref 98–107)
CODING SYSTEM: NORMAL
COHGB MFR BLD: 93.9 % (ref 95–96)
COLONOSCOPY: NORMAL
COLONOSCOPY: NORMAL
COLOR UR AUTO: YELLOW
CREAT SERPL-MCNC: 0.92 MG/DL (ref 0.51–0.95)
CREAT SERPL-MCNC: 0.94 MG/DL (ref 0.51–0.95)
CREAT SERPL-MCNC: 0.95 MG/DL (ref 0.51–0.95)
CREAT SERPL-MCNC: 0.96 MG/DL (ref 0.51–0.95)
CREAT SERPL-MCNC: 1.13 MG/DL (ref 0.51–0.95)
CREAT SERPL-MCNC: 1.39 MG/DL (ref 0.51–0.95)
CREAT SERPL-MCNC: 1.6 MG/DL (ref 0.51–0.95)
CREAT SERPL-MCNC: 1.76 MG/DL (ref 0.51–0.95)
CREAT SERPL-MCNC: 1.78 MG/DL (ref 0.51–0.95)
CROSSMATCH: NORMAL
DEPRECATED HCO3 PLAS-SCNC: 14 MMOL/L (ref 22–29)
DEPRECATED HCO3 PLAS-SCNC: 16 MMOL/L (ref 22–29)
DEPRECATED HCO3 PLAS-SCNC: 18 MMOL/L (ref 22–29)
DEPRECATED HCO3 PLAS-SCNC: 18 MMOL/L (ref 22–29)
DEPRECATED HCO3 PLAS-SCNC: 19 MMOL/L (ref 22–29)
DEPRECATED HCO3 PLAS-SCNC: 19 MMOL/L (ref 22–29)
DEPRECATED HCO3 PLAS-SCNC: 20 MMOL/L (ref 22–29)
DEPRECATED HCO3 PLAS-SCNC: 24 MMOL/L (ref 22–29)
DEPRECATED HCO3 PLAS-SCNC: 24 MMOL/L (ref 22–29)
DIASTOLIC BLOOD PRESSURE - MUSE: NORMAL MMHG
DIASTOLIC BLOOD PRESSURE - MUSE: NORMAL MMHG
EGFRCR SERPLBLD CKD-EPI 2021: 29 ML/MIN/1.73M2
EGFRCR SERPLBLD CKD-EPI 2021: 29 ML/MIN/1.73M2
EGFRCR SERPLBLD CKD-EPI 2021: 32 ML/MIN/1.73M2
EGFRCR SERPLBLD CKD-EPI 2021: 38 ML/MIN/1.73M2
EGFRCR SERPLBLD CKD-EPI 2021: 49 ML/MIN/1.73M2
EGFRCR SERPLBLD CKD-EPI 2021: 60 ML/MIN/1.73M2
EGFRCR SERPLBLD CKD-EPI 2021: 61 ML/MIN/1.73M2
EGFRCR SERPLBLD CKD-EPI 2021: 61 ML/MIN/1.73M2
EGFRCR SERPLBLD CKD-EPI 2021: 63 ML/MIN/1.73M2
ERYTHROCYTE [DISTWIDTH] IN BLOOD BY AUTOMATED COUNT: 14.7 % (ref 10–15)
ERYTHROCYTE [DISTWIDTH] IN BLOOD BY AUTOMATED COUNT: 14.7 % (ref 10–15)
ERYTHROCYTE [DISTWIDTH] IN BLOOD BY AUTOMATED COUNT: 16.6 % (ref 10–15)
ERYTHROCYTE [DISTWIDTH] IN BLOOD BY AUTOMATED COUNT: 16.9 % (ref 10–15)
ERYTHROCYTE [DISTWIDTH] IN BLOOD BY AUTOMATED COUNT: 17.6 % (ref 10–15)
ERYTHROCYTE [DISTWIDTH] IN BLOOD BY AUTOMATED COUNT: 17.9 % (ref 10–15)
ERYTHROCYTE [DISTWIDTH] IN BLOOD BY AUTOMATED COUNT: 18.3 % (ref 10–15)
ERYTHROCYTE [DISTWIDTH] IN BLOOD BY AUTOMATED COUNT: 18.6 % (ref 10–15)
FIBRINOGEN PPP-MCNC: 302 MG/DL (ref 170–490)
GLUCOSE BLDC GLUCOMTR-MCNC: 100 MG/DL (ref 70–99)
GLUCOSE BLDC GLUCOMTR-MCNC: 102 MG/DL (ref 70–99)
GLUCOSE BLDC GLUCOMTR-MCNC: 105 MG/DL (ref 70–99)
GLUCOSE BLDC GLUCOMTR-MCNC: 107 MG/DL (ref 70–99)
GLUCOSE BLDC GLUCOMTR-MCNC: 135 MG/DL (ref 70–99)
GLUCOSE BLDC GLUCOMTR-MCNC: 145 MG/DL (ref 70–99)
GLUCOSE BLDC GLUCOMTR-MCNC: 145 MG/DL (ref 70–99)
GLUCOSE BLDC GLUCOMTR-MCNC: 147 MG/DL (ref 70–99)
GLUCOSE BLDC GLUCOMTR-MCNC: 150 MG/DL (ref 70–99)
GLUCOSE BLDC GLUCOMTR-MCNC: 156 MG/DL (ref 70–99)
GLUCOSE BLDC GLUCOMTR-MCNC: 163 MG/DL (ref 70–99)
GLUCOSE BLDC GLUCOMTR-MCNC: 171 MG/DL (ref 70–99)
GLUCOSE BLDC GLUCOMTR-MCNC: 196 MG/DL (ref 70–99)
GLUCOSE BLDC GLUCOMTR-MCNC: 214 MG/DL (ref 70–99)
GLUCOSE BLDC GLUCOMTR-MCNC: 217 MG/DL (ref 70–99)
GLUCOSE BLDC GLUCOMTR-MCNC: 231 MG/DL (ref 70–99)
GLUCOSE BLDC GLUCOMTR-MCNC: 235 MG/DL (ref 70–99)
GLUCOSE BLDC GLUCOMTR-MCNC: 76 MG/DL (ref 70–99)
GLUCOSE BLDC GLUCOMTR-MCNC: 80 MG/DL (ref 70–99)
GLUCOSE BLDC GLUCOMTR-MCNC: 89 MG/DL (ref 70–99)
GLUCOSE BLDC GLUCOMTR-MCNC: 94 MG/DL (ref 70–99)
GLUCOSE BLDC GLUCOMTR-MCNC: 97 MG/DL (ref 70–99)
GLUCOSE SERPL-MCNC: 100 MG/DL (ref 70–99)
GLUCOSE SERPL-MCNC: 113 MG/DL (ref 70–99)
GLUCOSE SERPL-MCNC: 149 MG/DL (ref 70–99)
GLUCOSE SERPL-MCNC: 178 MG/DL (ref 70–99)
GLUCOSE SERPL-MCNC: 234 MG/DL (ref 70–99)
GLUCOSE SERPL-MCNC: 350 MG/DL (ref 70–99)
GLUCOSE SERPL-MCNC: 80 MG/DL (ref 70–99)
GLUCOSE SERPL-MCNC: 90 MG/DL (ref 70–99)
GLUCOSE SERPL-MCNC: 94 MG/DL (ref 70–99)
GLUCOSE UR STRIP-MCNC: NEGATIVE MG/DL
HCO3 BLD-SCNC: 17 MMOL/L (ref 23–29)
HCO3 BLDV-SCNC: 16 MMOL/L (ref 24–30)
HCO3 BLDV-SCNC: 29 MMOL/L (ref 24–30)
HCT VFR BLD AUTO: 19.7 % (ref 35–47)
HCT VFR BLD AUTO: 20.4 % (ref 35–47)
HCT VFR BLD AUTO: 21.3 % (ref 35–47)
HCT VFR BLD AUTO: 21.4 % (ref 35–47)
HCT VFR BLD AUTO: 22.5 % (ref 35–47)
HCT VFR BLD AUTO: 22.8 % (ref 35–47)
HCT VFR BLD AUTO: 23.3 % (ref 35–47)
HCT VFR BLD AUTO: 31.7 % (ref 35–47)
HGB BLD-MCNC: 11 G/DL (ref 11.7–15.7)
HGB BLD-MCNC: 11.1 G/DL (ref 11.7–15.7)
HGB BLD-MCNC: 6.4 G/DL (ref 11.7–15.7)
HGB BLD-MCNC: 6.7 G/DL (ref 11.7–15.7)
HGB BLD-MCNC: 6.7 G/DL (ref 11.7–15.7)
HGB BLD-MCNC: 6.9 G/DL (ref 11.7–15.7)
HGB BLD-MCNC: 7.2 G/DL (ref 11.7–15.7)
HGB BLD-MCNC: 7.2 G/DL (ref 11.7–15.7)
HGB BLD-MCNC: 7.3 G/DL (ref 11.7–15.7)
HGB BLD-MCNC: 7.3 G/DL (ref 11.7–15.7)
HGB BLD-MCNC: 7.4 G/DL (ref 11.7–15.7)
HGB BLD-MCNC: 7.4 G/DL (ref 11.7–15.7)
HGB BLD-MCNC: 7.5 G/DL (ref 11.7–15.7)
HGB BLD-MCNC: 7.5 G/DL (ref 11.7–15.7)
HGB BLD-MCNC: 7.7 G/DL (ref 11.7–15.7)
HGB BLD-MCNC: 7.8 G/DL (ref 11.7–15.7)
HGB BLD-MCNC: 7.9 G/DL (ref 11.7–15.7)
HGB BLD-MCNC: 7.9 G/DL (ref 11.7–15.7)
HGB BLD-MCNC: 8.3 G/DL (ref 11.7–15.7)
HGB BLD-MCNC: 8.4 G/DL (ref 11.7–15.7)
HGB BLD-MCNC: 8.4 G/DL (ref 11.7–15.7)
HGB BLD-MCNC: 8.7 G/DL (ref 11.7–15.7)
HGB BLD-MCNC: 9 G/DL (ref 11.7–15.7)
HGB UR QL STRIP: NEGATIVE
HOLD SPECIMEN: NORMAL
HOLD SPECIMEN: NORMAL
HYALINE CASTS: 13 /LPF
INR PPP: 1.76 (ref 0.85–1.15)
INTERPRETATION ECG - MUSE: NORMAL
INTERPRETATION ECG - MUSE: NORMAL
ION CA PH 7.4: 1.01 MMOL/L (ref 1.11–1.3)
ISSUE DATE AND TIME: NORMAL
KETONES UR STRIP-MCNC: ABNORMAL MG/DL
LACTATE SERPL-SCNC: 2.1 MMOL/L (ref 0.7–2)
LACTATE SERPL-SCNC: 2.9 MMOL/L (ref 0.7–2)
LACTATE SERPL-SCNC: 3 MMOL/L (ref 0.7–2)
LACTATE SERPL-SCNC: 3.5 MMOL/L (ref 0.7–2)
LACTATE SERPL-SCNC: 3.8 MMOL/L (ref 0.7–2)
LACTATE SERPL-SCNC: 4.3 MMOL/L (ref 0.7–2)
LACTATE SERPL-SCNC: 4.3 MMOL/L (ref 0.7–2)
LEUKOCYTE ESTERASE UR QL STRIP: ABNORMAL
MAGNESIUM SERPL-MCNC: 1.6 MG/DL (ref 1.7–2.3)
MAGNESIUM SERPL-MCNC: 1.7 MG/DL (ref 1.7–2.3)
MAGNESIUM SERPL-MCNC: 1.7 MG/DL (ref 1.7–2.3)
MAGNESIUM SERPL-MCNC: 1.8 MG/DL (ref 1.7–2.3)
MAGNESIUM SERPL-MCNC: 1.9 MG/DL (ref 1.7–2.3)
MCH RBC QN AUTO: 29.5 PG (ref 26.5–33)
MCH RBC QN AUTO: 29.9 PG (ref 26.5–33)
MCH RBC QN AUTO: 30.3 PG (ref 26.5–33)
MCH RBC QN AUTO: 30.4 PG (ref 26.5–33)
MCH RBC QN AUTO: 30.5 PG (ref 26.5–33)
MCH RBC QN AUTO: 31.2 PG (ref 26.5–33)
MCH RBC QN AUTO: 31.7 PG (ref 26.5–33)
MCH RBC QN AUTO: 32.7 PG (ref 26.5–33)
MCHC RBC AUTO-ENTMCNC: 32.5 G/DL (ref 31.5–36.5)
MCHC RBC AUTO-ENTMCNC: 33.3 G/DL (ref 31.5–36.5)
MCHC RBC AUTO-ENTMCNC: 33.5 G/DL (ref 31.5–36.5)
MCHC RBC AUTO-ENTMCNC: 33.8 G/DL (ref 31.5–36.5)
MCHC RBC AUTO-ENTMCNC: 34.6 G/DL (ref 31.5–36.5)
MCHC RBC AUTO-ENTMCNC: 34.7 G/DL (ref 31.5–36.5)
MCV RBC AUTO: 88 FL (ref 78–100)
MCV RBC AUTO: 90 FL (ref 78–100)
MCV RBC AUTO: 94 FL (ref 78–100)
MCV RBC AUTO: 98 FL (ref 78–100)
MUCOUS THREADS #/AREA URNS LPF: PRESENT /LPF
NITRATE UR QL: NEGATIVE
NT-PROBNP SERPL-MCNC: 1200 PG/ML (ref 0–1800)
NT-PROBNP SERPL-MCNC: 1902 PG/ML (ref 0–1800)
OXYHGB MFR BLD: 92.2 % (ref 95–96)
OXYHGB MFR BLDV: 70.4 % (ref 70–75)
OXYHGB MFR BLDV: 82.8 % (ref 70–75)
P AXIS - MUSE: 53 DEGREES
P AXIS - MUSE: 78 DEGREES
PATH REPORT.COMMENTS IMP SPEC: NORMAL
PATH REPORT.COMMENTS IMP SPEC: NORMAL
PATH REPORT.FINAL DX SPEC: NORMAL
PATH REPORT.GROSS SPEC: NORMAL
PATH REPORT.MICROSCOPIC SPEC OTHER STN: NORMAL
PATH REPORT.RELEVANT HX SPEC: NORMAL
PCO2 BLD: 26 MM HG (ref 35–45)
PCO2 BLDV: 29 MM HG (ref 35–50)
PCO2 BLDV: 41 MM HG (ref 35–50)
PH BLD: 7.42 [PH] (ref 7.37–7.44)
PH BLDV: 7.36 [PH] (ref 7.35–7.45)
PH BLDV: 7.45 [PH] (ref 7.35–7.45)
PH UR STRIP: 5.5 [PH] (ref 5–7)
PH: 7.31 (ref 7.35–7.45)
PHOTO IMAGE: NORMAL
PLATELET # BLD AUTO: 103 10E3/UL (ref 150–450)
PLATELET # BLD AUTO: 119 10E3/UL (ref 150–450)
PLATELET # BLD AUTO: 143 10E3/UL (ref 150–450)
PLATELET # BLD AUTO: 63 10E3/UL (ref 150–450)
PLATELET # BLD AUTO: 78 10E3/UL (ref 150–450)
PLATELET # BLD AUTO: 82 10E3/UL (ref 150–450)
PLATELET # BLD AUTO: 89 10E3/UL (ref 150–450)
PLATELET # BLD AUTO: 89 10E3/UL (ref 150–450)
PO2 BLD: 60 MM HG (ref 75–85)
PO2 BLDV: 36 MM HG (ref 25–47)
PO2 BLDV: 47 MM HG (ref 25–47)
POTASSIUM SERPL-SCNC: 3.5 MMOL/L (ref 3.4–5.3)
POTASSIUM SERPL-SCNC: 3.6 MMOL/L (ref 3.4–5.3)
POTASSIUM SERPL-SCNC: 3.6 MMOL/L (ref 3.4–5.3)
POTASSIUM SERPL-SCNC: 4.2 MMOL/L (ref 3.4–5.3)
POTASSIUM SERPL-SCNC: 4.2 MMOL/L (ref 3.4–5.3)
POTASSIUM SERPL-SCNC: 4.5 MMOL/L (ref 3.4–5.3)
POTASSIUM SERPL-SCNC: 4.6 MMOL/L (ref 3.4–5.3)
POTASSIUM SERPL-SCNC: 4.6 MMOL/L (ref 3.4–5.3)
POTASSIUM SERPL-SCNC: 5.6 MMOL/L (ref 3.4–5.3)
PR INTERVAL - MUSE: 140 MS
PR INTERVAL - MUSE: 144 MS
PROT SERPL-MCNC: 3.6 G/DL (ref 6.4–8.3)
QRS DURATION - MUSE: 76 MS
QRS DURATION - MUSE: 82 MS
QT - MUSE: 436 MS
QT - MUSE: 472 MS
QTC - MUSE: 483 MS
QTC - MUSE: 505 MS
R AXIS - MUSE: 14 DEGREES
R AXIS - MUSE: 65 DEGREES
RBC # BLD AUTO: 2.02 10E6/UL (ref 3.8–5.2)
RBC # BLD AUTO: 2.26 10E6/UL (ref 3.8–5.2)
RBC # BLD AUTO: 2.37 10E6/UL (ref 3.8–5.2)
RBC # BLD AUTO: 2.38 10E6/UL (ref 3.8–5.2)
RBC # BLD AUTO: 2.51 10E6/UL (ref 3.8–5.2)
RBC # BLD AUTO: 2.53 10E6/UL (ref 3.8–5.2)
RBC # BLD AUTO: 2.64 10E6/UL (ref 3.8–5.2)
RBC # BLD AUTO: 3.36 10E6/UL (ref 3.8–5.2)
RBC URINE: 3 /HPF
SAO2 % BLDV: 71.5 % (ref 70–75)
SAO2 % BLDV: 84.2 % (ref 70–75)
SODIUM SERPL-SCNC: 133 MMOL/L (ref 135–145)
SODIUM SERPL-SCNC: 137 MMOL/L (ref 135–145)
SODIUM SERPL-SCNC: 138 MMOL/L (ref 135–145)
SODIUM SERPL-SCNC: 138 MMOL/L (ref 135–145)
SODIUM SERPL-SCNC: 140 MMOL/L (ref 135–145)
SODIUM SERPL-SCNC: 142 MMOL/L (ref 135–145)
SODIUM SERPL-SCNC: 145 MMOL/L (ref 135–145)
SP GR UR STRIP: 1.02 (ref 1–1.03)
SPECIMEN EXPIRATION DATE: NORMAL
SYSTOLIC BLOOD PRESSURE - MUSE: NORMAL MMHG
SYSTOLIC BLOOD PRESSURE - MUSE: NORMAL MMHG
T AXIS - MUSE: 138 DEGREES
T AXIS - MUSE: 27 DEGREES
TEMPERATURE: 37 DEGREES C
TROPONIN T SERPL HS-MCNC: 40 NG/L
TSH SERPL DL<=0.005 MIU/L-ACNC: 22.68 UIU/ML (ref 0.3–4.2)
UNIT ABO/RH: NORMAL
UNIT NUMBER: NORMAL
UNIT STATUS: NORMAL
UNIT TYPE ISBT: 5100
UNIT TYPE ISBT: 6200
UNIT TYPE ISBT: 9500
UROBILINOGEN UR STRIP-MCNC: <2 MG/DL
VENTRICULAR RATE- MUSE: 69 BPM
VENTRICULAR RATE- MUSE: 74 BPM
WBC # BLD AUTO: 1.6 10E3/UL (ref 4–11)
WBC # BLD AUTO: 10.3 10E3/UL (ref 4–11)
WBC # BLD AUTO: 12.2 10E3/UL (ref 4–11)
WBC # BLD AUTO: 12.3 10E3/UL (ref 4–11)
WBC # BLD AUTO: 16.5 10E3/UL (ref 4–11)
WBC # BLD AUTO: 5.5 10E3/UL (ref 4–11)
WBC # BLD AUTO: 6.6 10E3/UL (ref 4–11)
WBC # BLD AUTO: 8.4 10E3/UL (ref 4–11)
WBC URINE: 27 /HPF
YEAST #/AREA URNS HPF: ABNORMAL /HPF

## 2024-01-01 PROCEDURE — 83735 ASSAY OF MAGNESIUM: CPT | Performed by: INTERNAL MEDICINE

## 2024-01-01 PROCEDURE — 85018 HEMOGLOBIN: CPT | Performed by: INTERNAL MEDICINE

## 2024-01-01 PROCEDURE — 87070 CULTURE OTHR SPECIMN AEROBIC: CPT | Performed by: INTERNAL MEDICINE

## 2024-01-01 PROCEDURE — 258N000003 HC RX IP 258 OP 636: Performed by: NURSE ANESTHETIST, CERTIFIED REGISTERED

## 2024-01-01 PROCEDURE — 272N000451 HC KIT SHRLOCK 5FR POWER PICC DOUBLE LUMEN

## 2024-01-01 PROCEDURE — 75726 ARTERY X-RAYS ABDOMEN: CPT

## 2024-01-01 PROCEDURE — 71045 X-RAY EXAM CHEST 1 VIEW: CPT

## 2024-01-01 PROCEDURE — 250N000011 HC RX IP 250 OP 636: Performed by: SURGERY

## 2024-01-01 PROCEDURE — 83880 ASSAY OF NATRIURETIC PEPTIDE: CPT | Performed by: STUDENT IN AN ORGANIZED HEALTH CARE EDUCATION/TRAINING PROGRAM

## 2024-01-01 PROCEDURE — 258N000003 HC RX IP 258 OP 636: Performed by: INTERNAL MEDICINE

## 2024-01-01 PROCEDURE — 80048 BASIC METABOLIC PNL TOTAL CA: CPT | Performed by: STUDENT IN AN ORGANIZED HEALTH CARE EDUCATION/TRAINING PROGRAM

## 2024-01-01 PROCEDURE — 250N000011 HC RX IP 250 OP 636: Performed by: STUDENT IN AN ORGANIZED HEALTH CARE EDUCATION/TRAINING PROGRAM

## 2024-01-01 PROCEDURE — 94640 AIRWAY INHALATION TREATMENT: CPT

## 2024-01-01 PROCEDURE — 250N000009 HC RX 250: Performed by: STUDENT IN AN ORGANIZED HEALTH CARE EDUCATION/TRAINING PROGRAM

## 2024-01-01 PROCEDURE — P9016 RBC LEUKOCYTES REDUCED: HCPCS | Performed by: SURGERY

## 2024-01-01 PROCEDURE — 36415 COLL VENOUS BLD VENIPUNCTURE: CPT

## 2024-01-01 PROCEDURE — 710N000009 HC RECOVERY PHASE 1, LEVEL 1, PER MIN: Performed by: SURGERY

## 2024-01-01 PROCEDURE — 80048 BASIC METABOLIC PNL TOTAL CA: CPT | Performed by: HOSPITALIST

## 2024-01-01 PROCEDURE — 250N000013 HC RX MED GY IP 250 OP 250 PS 637: Performed by: HOSPITALIST

## 2024-01-01 PROCEDURE — C1769 GUIDE WIRE: HCPCS

## 2024-01-01 PROCEDURE — P9040 RBC LEUKOREDUCED IRRADIATED: HCPCS | Performed by: INTERNAL MEDICINE

## 2024-01-01 PROCEDURE — 85027 COMPLETE CBC AUTOMATED: CPT | Performed by: HOSPITALIST

## 2024-01-01 PROCEDURE — 999N000157 HC STATISTIC RCP TIME EA 10 MIN

## 2024-01-01 PROCEDURE — 49593 RPR AA HRN 1ST 3-10 RDC: CPT | Mod: 51 | Performed by: SURGERY

## 2024-01-01 PROCEDURE — P9016 RBC LEUKOCYTES REDUCED: HCPCS | Performed by: INTERNAL MEDICINE

## 2024-01-01 PROCEDURE — 81001 URINALYSIS AUTO W/SCOPE: CPT | Performed by: STUDENT IN AN ORGANIZED HEALTH CARE EDUCATION/TRAINING PROGRAM

## 2024-01-01 PROCEDURE — 272N000566 HC SHEATH CR3

## 2024-01-01 PROCEDURE — 82805 BLOOD GASES W/O2 SATURATION: CPT | Performed by: STUDENT IN AN ORGANIZED HEALTH CARE EDUCATION/TRAINING PROGRAM

## 2024-01-01 PROCEDURE — 86923 COMPATIBILITY TEST ELECTRIC: CPT | Performed by: STUDENT IN AN ORGANIZED HEALTH CARE EDUCATION/TRAINING PROGRAM

## 2024-01-01 PROCEDURE — 93005 ELECTROCARDIOGRAM TRACING: CPT

## 2024-01-01 PROCEDURE — 82805 BLOOD GASES W/O2 SATURATION: CPT | Performed by: INTERNAL MEDICINE

## 2024-01-01 PROCEDURE — 85014 HEMATOCRIT: CPT | Performed by: STUDENT IN AN ORGANIZED HEALTH CARE EDUCATION/TRAINING PROGRAM

## 2024-01-01 PROCEDURE — 93010 ELECTROCARDIOGRAM REPORT: CPT | Performed by: INTERNAL MEDICINE

## 2024-01-01 PROCEDURE — 85027 COMPLETE CBC AUTOMATED: CPT | Performed by: INTERNAL MEDICINE

## 2024-01-01 PROCEDURE — 86900 BLOOD TYPING SEROLOGIC ABO: CPT | Performed by: INTERNAL MEDICINE

## 2024-01-01 PROCEDURE — 370N000017 HC ANESTHESIA TECHNICAL FEE, PER MIN: Performed by: SURGERY

## 2024-01-01 PROCEDURE — 86923 COMPATIBILITY TEST ELECTRIC: CPT | Performed by: SURGERY

## 2024-01-01 PROCEDURE — 250N000011 HC RX IP 250 OP 636: Mod: JZ | Performed by: INTERNAL MEDICINE

## 2024-01-01 PROCEDURE — 999N000065 XR ABDOMEN 1 VIEW

## 2024-01-01 PROCEDURE — 999N000141 HC STATISTIC PRE-PROCEDURE NURSING ASSESSMENT: Performed by: SURGERY

## 2024-01-01 PROCEDURE — 85027 COMPLETE CBC AUTOMATED: CPT | Performed by: STUDENT IN AN ORGANIZED HEALTH CARE EDUCATION/TRAINING PROGRAM

## 2024-01-01 PROCEDURE — 86923 COMPATIBILITY TEST ELECTRIC: CPT | Performed by: INTERNAL MEDICINE

## 2024-01-01 PROCEDURE — 85027 COMPLETE CBC AUTOMATED: CPT | Performed by: SURGERY

## 2024-01-01 PROCEDURE — 85610 PROTHROMBIN TIME: CPT | Performed by: INTERNAL MEDICINE

## 2024-01-01 PROCEDURE — 44204 LAPARO PARTIAL COLECTOMY: CPT | Performed by: SURGERY

## 2024-01-01 PROCEDURE — 36415 COLL VENOUS BLD VENIPUNCTURE: CPT | Performed by: SURGERY

## 2024-01-01 PROCEDURE — 87040 BLOOD CULTURE FOR BACTERIA: CPT | Performed by: STUDENT IN AN ORGANIZED HEALTH CARE EDUCATION/TRAINING PROGRAM

## 2024-01-01 PROCEDURE — 83605 ASSAY OF LACTIC ACID: CPT | Performed by: STUDENT IN AN ORGANIZED HEALTH CARE EDUCATION/TRAINING PROGRAM

## 2024-01-01 PROCEDURE — 250N000013 HC RX MED GY IP 250 OP 250 PS 637: Performed by: SURGERY

## 2024-01-01 PROCEDURE — 250N000012 HC RX MED GY IP 250 OP 636 PS 637: Performed by: INTERNAL MEDICINE

## 2024-01-01 PROCEDURE — 84443 ASSAY THYROID STIM HORMONE: CPT | Performed by: INTERNAL MEDICINE

## 2024-01-01 PROCEDURE — 250N000009 HC RX 250: Performed by: SURGERY

## 2024-01-01 PROCEDURE — 120N000001 HC R&B MED SURG/OB

## 2024-01-01 PROCEDURE — 258N000003 HC RX IP 258 OP 636: Performed by: STUDENT IN AN ORGANIZED HEALTH CARE EDUCATION/TRAINING PROGRAM

## 2024-01-01 PROCEDURE — 250N000011 HC RX IP 250 OP 636: Performed by: NURSE ANESTHETIST, CERTIFIED REGISTERED

## 2024-01-01 PROCEDURE — 36415 COLL VENOUS BLD VENIPUNCTURE: CPT | Performed by: STUDENT IN AN ORGANIZED HEALTH CARE EDUCATION/TRAINING PROGRAM

## 2024-01-01 PROCEDURE — P9059 PLASMA, FRZ BETWEEN 8-24HOUR: HCPCS | Performed by: INTERNAL MEDICINE

## 2024-01-01 PROCEDURE — 272N000001 HC OR GENERAL SUPPLY STERILE: Performed by: SURGERY

## 2024-01-01 PROCEDURE — 85018 HEMOGLOBIN: CPT | Performed by: SURGERY

## 2024-01-01 PROCEDURE — C1887 CATHETER, GUIDING: HCPCS

## 2024-01-01 PROCEDURE — P9016 RBC LEUKOCYTES REDUCED: HCPCS | Performed by: STUDENT IN AN ORGANIZED HEALTH CARE EDUCATION/TRAINING PROGRAM

## 2024-01-01 PROCEDURE — 99207 PR NO BILLABLE SERVICE THIS VISIT: CPT | Performed by: PHYSICIAN ASSISTANT

## 2024-01-01 PROCEDURE — 88307 TISSUE EXAM BY PATHOLOGIST: CPT | Mod: TC | Performed by: SURGERY

## 2024-01-01 PROCEDURE — 83735 ASSAY OF MAGNESIUM: CPT | Performed by: SURGERY

## 2024-01-01 PROCEDURE — 272N000567 HC SHEATH CR4

## 2024-01-01 PROCEDURE — 250N000009 HC RX 250: Performed by: NURSE ANESTHETIST, CERTIFIED REGISTERED

## 2024-01-01 PROCEDURE — 99222 1ST HOSP IP/OBS MODERATE 55: CPT | Mod: FS | Performed by: SURGERY

## 2024-01-01 PROCEDURE — 88307 TISSUE EXAM BY PATHOLOGIST: CPT | Mod: 26 | Performed by: PATHOLOGY

## 2024-01-01 PROCEDURE — P9016 RBC LEUKOCYTES REDUCED: HCPCS

## 2024-01-01 PROCEDURE — 200N000001 HC R&B ICU

## 2024-01-01 PROCEDURE — 370N000017 HC ANESTHESIA TECHNICAL FEE, PER MIN: Performed by: INTERNAL MEDICINE

## 2024-01-01 PROCEDURE — 250N000009 HC RX 250

## 2024-01-01 PROCEDURE — 258N000003 HC RX IP 258 OP 636: Performed by: SURGERY

## 2024-01-01 PROCEDURE — 80048 BASIC METABOLIC PNL TOTAL CA: CPT | Performed by: SURGERY

## 2024-01-01 PROCEDURE — 250N000013 HC RX MED GY IP 250 OP 250 PS 637: Performed by: INTERNAL MEDICINE

## 2024-01-01 PROCEDURE — 75774 ARTERY X-RAY EACH VESSEL: CPT

## 2024-01-01 PROCEDURE — 258N000001 HC RX 258: Performed by: SURGERY

## 2024-01-01 PROCEDURE — 258N000003 HC RX IP 258 OP 636

## 2024-01-01 PROCEDURE — 250N000009 HC RX 250: Performed by: INTERNAL MEDICINE

## 2024-01-01 PROCEDURE — 0WCG0ZZ EXTIRPATION OF MATTER FROM PERITONEAL CAVITY, OPEN APPROACH: ICD-10-PCS | Performed by: SURGERY

## 2024-01-01 PROCEDURE — 99153 MOD SED SAME PHYS/QHP EA: CPT

## 2024-01-01 PROCEDURE — 36415 COLL VENOUS BLD VENIPUNCTURE: CPT | Performed by: ANESTHESIOLOGY

## 2024-01-01 PROCEDURE — 82310 ASSAY OF CALCIUM: CPT | Performed by: STUDENT IN AN ORGANIZED HEALTH CARE EDUCATION/TRAINING PROGRAM

## 2024-01-01 PROCEDURE — 80048 BASIC METABOLIC PNL TOTAL CA: CPT | Performed by: INTERNAL MEDICINE

## 2024-01-01 PROCEDURE — 999N000215 HC STATISTIC HFNC ADULT NON-CPAP

## 2024-01-01 PROCEDURE — 74176 CT ABD & PELVIS W/O CONTRAST: CPT

## 2024-01-01 PROCEDURE — 85018 HEMOGLOBIN: CPT | Performed by: ANESTHESIOLOGY

## 2024-01-01 PROCEDURE — 99291 CRITICAL CARE FIRST HOUR: CPT | Performed by: INTERNAL MEDICINE

## 2024-01-01 PROCEDURE — 999N000141 HC STATISTIC PRE-PROCEDURE NURSING ASSESSMENT: Performed by: INTERNAL MEDICINE

## 2024-01-01 PROCEDURE — 0DBN4ZZ EXCISION OF SIGMOID COLON, PERCUTANEOUS ENDOSCOPIC APPROACH: ICD-10-PCS | Performed by: SURGERY

## 2024-01-01 PROCEDURE — 83605 ASSAY OF LACTIC ACID: CPT

## 2024-01-01 PROCEDURE — 99233 SBSQ HOSP IP/OBS HIGH 50: CPT | Performed by: INTERNAL MEDICINE

## 2024-01-01 PROCEDURE — 36246 INS CATH ABD/L-EXT ART 2ND: CPT | Mod: XS

## 2024-01-01 PROCEDURE — 250N000011 HC RX IP 250 OP 636

## 2024-01-01 PROCEDURE — 360N000077 HC SURGERY LEVEL 4, PER MIN: Performed by: SURGERY

## 2024-01-01 PROCEDURE — 99238 HOSP IP/OBS DSCHRG MGMT 30/<: CPT | Performed by: STUDENT IN AN ORGANIZED HEALTH CARE EDUCATION/TRAINING PROGRAM

## 2024-01-01 PROCEDURE — 360N000075 HC SURGERY LEVEL 2, PER MIN: Performed by: INTERNAL MEDICINE

## 2024-01-01 PROCEDURE — C9113 INJ PANTOPRAZOLE SODIUM, VIA: HCPCS | Performed by: SURGERY

## 2024-01-01 PROCEDURE — B4151ZZ FLUOROSCOPY OF INFERIOR MESENTERIC ARTERY USING LOW OSMOLAR CONTRAST: ICD-10-PCS | Performed by: RADIOLOGY

## 2024-01-01 PROCEDURE — 99233 SBSQ HOSP IP/OBS HIGH 50: CPT | Performed by: STUDENT IN AN ORGANIZED HEALTH CARE EDUCATION/TRAINING PROGRAM

## 2024-01-01 PROCEDURE — 0W3P8ZZ CONTROL BLEEDING IN GASTROINTESTINAL TRACT, VIA NATURAL OR ARTIFICIAL OPENING ENDOSCOPIC: ICD-10-PCS | Performed by: INTERNAL MEDICINE

## 2024-01-01 PROCEDURE — P9059 PLASMA, FRZ BETWEEN 8-24HOUR: HCPCS | Performed by: SURGERY

## 2024-01-01 PROCEDURE — 36415 COLL VENOUS BLD VENIPUNCTURE: CPT | Performed by: INTERNAL MEDICINE

## 2024-01-01 PROCEDURE — 272N000054 HC CANNULA HIGH FLOW, ADULT

## 2024-01-01 PROCEDURE — 99024 POSTOP FOLLOW-UP VISIT: CPT | Performed by: PHYSICIAN ASSISTANT

## 2024-01-01 PROCEDURE — 250N000025 HC SEVOFLURANE, PER MIN: Performed by: SURGERY

## 2024-01-01 PROCEDURE — 93971 EXTREMITY STUDY: CPT | Mod: RT

## 2024-01-01 PROCEDURE — 250N000013 HC RX MED GY IP 250 OP 250 PS 637

## 2024-01-01 PROCEDURE — 87106 FUNGI IDENTIFICATION YEAST: CPT | Performed by: STUDENT IN AN ORGANIZED HEALTH CARE EDUCATION/TRAINING PROGRAM

## 2024-01-01 PROCEDURE — 0WQF0ZZ REPAIR ABDOMINAL WALL, OPEN APPROACH: ICD-10-PCS | Performed by: SURGERY

## 2024-01-01 PROCEDURE — C9113 INJ PANTOPRAZOLE SODIUM, VIA: HCPCS | Performed by: INTERNAL MEDICINE

## 2024-01-01 PROCEDURE — 36415 COLL VENOUS BLD VENIPUNCTURE: CPT | Performed by: HOSPITALIST

## 2024-01-01 PROCEDURE — 250N000011 HC RX IP 250 OP 636: Performed by: INTERNAL MEDICINE

## 2024-01-01 PROCEDURE — 272N000001 HC OR GENERAL SUPPLY STERILE: Performed by: INTERNAL MEDICINE

## 2024-01-01 PROCEDURE — 3E043XZ INTRODUCTION OF VASOPRESSOR INTO CENTRAL VEIN, PERCUTANEOUS APPROACH: ICD-10-PCS | Performed by: STUDENT IN AN ORGANIZED HEALTH CARE EDUCATION/TRAINING PROGRAM

## 2024-01-01 PROCEDURE — 36569 INSJ PICC 5 YR+ W/O IMAGING: CPT

## 2024-01-01 PROCEDURE — 85384 FIBRINOGEN ACTIVITY: CPT | Performed by: INTERNAL MEDICINE

## 2024-01-01 PROCEDURE — 93005 ELECTROCARDIOGRAM TRACING: CPT | Performed by: INTERNAL MEDICINE

## 2024-01-01 PROCEDURE — 272N000500 HC NEEDLE CR2

## 2024-01-01 PROCEDURE — 0DTM4ZZ RESECTION OF DESCENDING COLON, PERCUTANEOUS ENDOSCOPIC APPROACH: ICD-10-PCS | Performed by: SURGERY

## 2024-01-01 PROCEDURE — 250N000011 HC RX IP 250 OP 636: Performed by: PHYSICIAN ASSISTANT

## 2024-01-01 PROCEDURE — 255N000002 HC RX 255 OP 636: Performed by: RADIOLOGY

## 2024-01-01 PROCEDURE — 82330 ASSAY OF CALCIUM: CPT | Performed by: STUDENT IN AN ORGANIZED HEALTH CARE EDUCATION/TRAINING PROGRAM

## 2024-01-01 PROCEDURE — 0DJD8ZZ INSPECTION OF LOWER INTESTINAL TRACT, VIA NATURAL OR ARTIFICIAL OPENING ENDOSCOPIC: ICD-10-PCS | Performed by: INTERNAL MEDICINE

## 2024-01-01 PROCEDURE — 82805 BLOOD GASES W/O2 SATURATION: CPT

## 2024-01-01 PROCEDURE — 36247 INS CATH ABD/L-EXT ART 3RD: CPT

## 2024-01-01 PROCEDURE — 84484 ASSAY OF TROPONIN QUANT: CPT

## 2024-01-01 PROCEDURE — 83605 ASSAY OF LACTIC ACID: CPT | Performed by: INTERNAL MEDICINE

## 2024-01-01 PROCEDURE — 80053 COMPREHEN METABOLIC PANEL: CPT | Performed by: SURGERY

## 2024-01-01 PROCEDURE — 360N000076 HC SURGERY LEVEL 3, PER MIN: Performed by: SURGERY

## 2024-01-01 PROCEDURE — 99231 SBSQ HOSP IP/OBS SF/LOW 25: CPT | Mod: 57

## 2024-01-01 PROCEDURE — 85018 HEMOGLOBIN: CPT | Performed by: STUDENT IN AN ORGANIZED HEALTH CARE EDUCATION/TRAINING PROGRAM

## 2024-01-01 PROCEDURE — 99232 SBSQ HOSP IP/OBS MODERATE 35: CPT | Performed by: STUDENT IN AN ORGANIZED HEALTH CARE EDUCATION/TRAINING PROGRAM

## 2024-01-01 PROCEDURE — B4141ZZ FLUOROSCOPY OF SUPERIOR MESENTERIC ARTERY USING LOW OSMOLAR CONTRAST: ICD-10-PCS | Performed by: RADIOLOGY

## 2024-01-01 PROCEDURE — 93010 ELECTROCARDIOGRAM REPORT: CPT | Mod: RTG | Performed by: INTERNAL MEDICINE

## 2024-01-01 RX ORDER — CEFAZOLIN SODIUM/WATER 2 G/20 ML
2 SYRINGE (ML) INTRAVENOUS SEE ADMIN INSTRUCTIONS
Status: DISCONTINUED | OUTPATIENT
Start: 2024-01-01 | End: 2024-01-01

## 2024-01-01 RX ORDER — FENTANYL CITRATE 50 UG/ML
25 INJECTION, SOLUTION INTRAMUSCULAR; INTRAVENOUS EVERY 5 MIN PRN
Status: DISCONTINUED | OUTPATIENT
Start: 2024-01-01 | End: 2024-01-01

## 2024-01-01 RX ORDER — SODIUM CHLORIDE, SODIUM LACTATE, POTASSIUM CHLORIDE, CALCIUM CHLORIDE 600; 310; 30; 20 MG/100ML; MG/100ML; MG/100ML; MG/100ML
INJECTION, SOLUTION INTRAVENOUS CONTINUOUS
Status: DISCONTINUED | OUTPATIENT
Start: 2024-01-01 | End: 2024-01-01

## 2024-01-01 RX ORDER — FLUMAZENIL 0.1 MG/ML
0.2 INJECTION, SOLUTION INTRAVENOUS
Status: DISCONTINUED | OUTPATIENT
Start: 2024-01-01 | End: 2024-01-01 | Stop reason: HOSPADM

## 2024-01-01 RX ORDER — DEXTROSE MONOHYDRATE, SODIUM CHLORIDE, AND POTASSIUM CHLORIDE 50; 1.49; 4.5 G/1000ML; G/1000ML; G/1000ML
INJECTION, SOLUTION INTRAVENOUS CONTINUOUS
Status: DISCONTINUED | OUTPATIENT
Start: 2024-01-01 | End: 2024-01-01

## 2024-01-01 RX ORDER — NALOXONE HYDROCHLORIDE 0.4 MG/ML
0.2 INJECTION, SOLUTION INTRAMUSCULAR; INTRAVENOUS; SUBCUTANEOUS
Status: DISCONTINUED | OUTPATIENT
Start: 2024-01-01 | End: 2024-01-01

## 2024-01-01 RX ORDER — BUPIVACAINE HYDROCHLORIDE 2.5 MG/ML
INJECTION, SOLUTION EPIDURAL; INFILTRATION; INTRACAUDAL PRN
Status: DISCONTINUED | OUTPATIENT
Start: 2024-01-01 | End: 2024-01-01 | Stop reason: HOSPADM

## 2024-01-01 RX ORDER — SODIUM CHLORIDE, SODIUM LACTATE, POTASSIUM CHLORIDE, CALCIUM CHLORIDE 600; 310; 30; 20 MG/100ML; MG/100ML; MG/100ML; MG/100ML
INJECTION, SOLUTION INTRAVENOUS CONTINUOUS PRN
Status: DISCONTINUED | OUTPATIENT
Start: 2024-01-01 | End: 2024-01-01

## 2024-01-01 RX ORDER — CALCIUM GLUCONATE 20 MG/ML
1 INJECTION, SOLUTION INTRAVENOUS ONCE
Status: COMPLETED | OUTPATIENT
Start: 2024-01-01 | End: 2024-01-01

## 2024-01-01 RX ORDER — PROCHLORPERAZINE MALEATE 5 MG
5 TABLET ORAL EVERY 6 HOURS PRN
Status: DISCONTINUED | OUTPATIENT
Start: 2024-01-01 | End: 2024-01-01 | Stop reason: HOSPADM

## 2024-01-01 RX ORDER — HYDROMORPHONE HYDROCHLORIDE 1 MG/ML
0.5 INJECTION, SOLUTION INTRAMUSCULAR; INTRAVENOUS; SUBCUTANEOUS
Status: DISCONTINUED | OUTPATIENT
Start: 2024-01-01 | End: 2024-01-01 | Stop reason: HOSPADM

## 2024-01-01 RX ORDER — ONDANSETRON 2 MG/ML
4 INJECTION INTRAMUSCULAR; INTRAVENOUS
Status: DISCONTINUED | OUTPATIENT
Start: 2024-01-01 | End: 2024-01-01 | Stop reason: HOSPADM

## 2024-01-01 RX ORDER — LIDOCAINE HYDROCHLORIDE 10 MG/ML
INJECTION, SOLUTION INFILTRATION; PERINEURAL PRN
Status: DISCONTINUED | OUTPATIENT
Start: 2024-01-01 | End: 2024-01-01

## 2024-01-01 RX ORDER — CEFAZOLIN SODIUM/WATER 2 G/20 ML
SYRINGE (ML) INTRAVENOUS
Status: DISCONTINUED
Start: 2024-01-01 | End: 2024-01-01 | Stop reason: HOSPADM

## 2024-01-01 RX ORDER — ACETAMINOPHEN 325 MG/1
975 TABLET ORAL ONCE
Status: DISCONTINUED | OUTPATIENT
Start: 2024-01-01 | End: 2024-01-01

## 2024-01-01 RX ORDER — VANCOMYCIN HYDROCHLORIDE 1 G/200ML
1000 INJECTION, SOLUTION INTRAVENOUS ONCE
Qty: 200 ML | Refills: 0 | Status: COMPLETED | OUTPATIENT
Start: 2024-01-01 | End: 2024-01-01

## 2024-01-01 RX ORDER — DEXTROSE MONOHYDRATE 25 G/50ML
25-50 INJECTION, SOLUTION INTRAVENOUS
Status: DISCONTINUED | OUTPATIENT
Start: 2024-01-01 | End: 2024-01-01

## 2024-01-01 RX ORDER — ONDANSETRON 4 MG/1
4 TABLET, ORALLY DISINTEGRATING ORAL EVERY 6 HOURS PRN
Status: DISCONTINUED | OUTPATIENT
Start: 2024-01-01 | End: 2024-01-01

## 2024-01-01 RX ORDER — ONDANSETRON 4 MG/1
4 TABLET, ORALLY DISINTEGRATING ORAL EVERY 30 MIN PRN
Status: DISCONTINUED | OUTPATIENT
Start: 2024-01-01 | End: 2024-01-01

## 2024-01-01 RX ORDER — DEXAMETHASONE SODIUM PHOSPHATE 4 MG/ML
INJECTION, SOLUTION INTRA-ARTICULAR; INTRALESIONAL; INTRAMUSCULAR; INTRAVENOUS; SOFT TISSUE PRN
Status: DISCONTINUED | OUTPATIENT
Start: 2024-01-01 | End: 2024-01-01

## 2024-01-01 RX ORDER — PROCHLORPERAZINE MALEATE 5 MG
5 TABLET ORAL EVERY 6 HOURS PRN
Status: DISCONTINUED | OUTPATIENT
Start: 2024-01-01 | End: 2024-01-01

## 2024-01-01 RX ORDER — ONDANSETRON 2 MG/ML
4 INJECTION INTRAMUSCULAR; INTRAVENOUS EVERY 6 HOURS PRN
Status: DISCONTINUED | OUTPATIENT
Start: 2024-01-01 | End: 2024-01-01

## 2024-01-01 RX ORDER — HYDROMORPHONE HCL IN WATER/PF 6 MG/30 ML
0.1 PATIENT CONTROLLED ANALGESIA SYRINGE INTRAVENOUS EVERY 4 HOURS PRN
Status: DISCONTINUED | OUTPATIENT
Start: 2024-01-01 | End: 2024-01-01

## 2024-01-01 RX ORDER — DEXTROSE MONOHYDRATE, SODIUM CHLORIDE, AND POTASSIUM CHLORIDE 50; 1.49; 9 G/1000ML; G/1000ML; G/1000ML
INJECTION, SOLUTION INTRAVENOUS CONTINUOUS
Status: DISCONTINUED | OUTPATIENT
Start: 2024-01-01 | End: 2024-01-01

## 2024-01-01 RX ORDER — CEFAZOLIN SODIUM/WATER 2 G/20 ML
2 SYRINGE (ML) INTRAVENOUS SEE ADMIN INSTRUCTIONS
Status: DISCONTINUED | OUTPATIENT
Start: 2024-01-01 | End: 2024-01-01 | Stop reason: HOSPADM

## 2024-01-01 RX ORDER — CARBOXYMETHYLCELLULOSE SODIUM 5 MG/ML
1-2 SOLUTION/ DROPS OPHTHALMIC
Status: DISCONTINUED | OUTPATIENT
Start: 2024-01-01 | End: 2024-01-01 | Stop reason: HOSPADM

## 2024-01-01 RX ORDER — VANCOMYCIN HYDROCHLORIDE 1 G/200ML
1000 INJECTION, SOLUTION INTRAVENOUS EVERY 12 HOURS
Status: DISCONTINUED | OUTPATIENT
Start: 2024-01-01 | End: 2024-01-01

## 2024-01-01 RX ORDER — PROPOFOL 10 MG/ML
INJECTION, EMULSION INTRAVENOUS PRN
Status: DISCONTINUED | OUTPATIENT
Start: 2024-01-01 | End: 2024-01-01

## 2024-01-01 RX ORDER — NALOXONE HYDROCHLORIDE 0.4 MG/ML
0.2 INJECTION, SOLUTION INTRAMUSCULAR; INTRAVENOUS; SUBCUTANEOUS
Status: DISCONTINUED | OUTPATIENT
Start: 2024-01-01 | End: 2024-01-01 | Stop reason: HOSPADM

## 2024-01-01 RX ORDER — CEFAZOLIN SODIUM/WATER 2 G/20 ML
2 SYRINGE (ML) INTRAVENOUS
Status: DISCONTINUED | OUTPATIENT
Start: 2024-01-01 | End: 2024-01-01

## 2024-01-01 RX ORDER — HYDROMORPHONE HCL IN WATER/PF 6 MG/30 ML
0.1 PATIENT CONTROLLED ANALGESIA SYRINGE INTRAVENOUS
Status: DISCONTINUED | OUTPATIENT
Start: 2024-01-01 | End: 2024-01-01

## 2024-01-01 RX ORDER — CALCIUM CHLORIDE 100 MG/ML
INJECTION INTRAVENOUS; INTRAVENTRICULAR PRN
Status: DISCONTINUED | OUTPATIENT
Start: 2024-01-01 | End: 2024-01-01

## 2024-01-01 RX ORDER — OXYCODONE HYDROCHLORIDE 5 MG/1
5 TABLET ORAL EVERY 4 HOURS PRN
Status: DISCONTINUED | OUTPATIENT
Start: 2024-01-01 | End: 2024-01-01

## 2024-01-01 RX ORDER — SODIUM CHLORIDE 9 MG/ML
INJECTION, SOLUTION INTRAVENOUS CONTINUOUS PRN
Status: DISCONTINUED | OUTPATIENT
Start: 2024-01-01 | End: 2024-01-01

## 2024-01-01 RX ORDER — HYDROMORPHONE HCL IN WATER/PF 6 MG/30 ML
0.4 PATIENT CONTROLLED ANALGESIA SYRINGE INTRAVENOUS EVERY 4 HOURS PRN
Status: DISCONTINUED | OUTPATIENT
Start: 2024-01-01 | End: 2024-01-01

## 2024-01-01 RX ORDER — ACETAMINOPHEN 325 MG/1
975 TABLET ORAL EVERY 8 HOURS
Qty: 27 TABLET | Refills: 0 | Status: DISCONTINUED | OUTPATIENT
Start: 2024-01-01 | End: 2024-01-01

## 2024-01-01 RX ORDER — LORAZEPAM 1 MG/1
1 TABLET ORAL
Status: DISCONTINUED | OUTPATIENT
Start: 2024-01-01 | End: 2024-01-01 | Stop reason: HOSPADM

## 2024-01-01 RX ORDER — FENTANYL CITRATE 50 UG/ML
50 INJECTION, SOLUTION INTRAMUSCULAR; INTRAVENOUS EVERY 5 MIN PRN
Status: DISCONTINUED | OUTPATIENT
Start: 2024-01-01 | End: 2024-01-01

## 2024-01-01 RX ORDER — NICOTINE POLACRILEX 4 MG
15-30 LOZENGE BUCCAL
Status: DISCONTINUED | OUTPATIENT
Start: 2024-01-01 | End: 2024-01-01

## 2024-01-01 RX ORDER — FLUMAZENIL 0.1 MG/ML
0.2 INJECTION, SOLUTION INTRAVENOUS
Status: ACTIVE | OUTPATIENT
Start: 2024-01-01 | End: 2024-01-01

## 2024-01-01 RX ORDER — HYDROMORPHONE HCL IN WATER/PF 6 MG/30 ML
0.2 PATIENT CONTROLLED ANALGESIA SYRINGE INTRAVENOUS
Status: DISCONTINUED | OUTPATIENT
Start: 2024-01-01 | End: 2024-01-01

## 2024-01-01 RX ORDER — FENTANYL CITRATE 50 UG/ML
INJECTION, SOLUTION INTRAMUSCULAR; INTRAVENOUS PRN
Status: DISCONTINUED | OUTPATIENT
Start: 2024-01-01 | End: 2024-01-01

## 2024-01-01 RX ORDER — NALOXONE HYDROCHLORIDE 0.4 MG/ML
0.4 INJECTION, SOLUTION INTRAMUSCULAR; INTRAVENOUS; SUBCUTANEOUS
Status: DISCONTINUED | OUTPATIENT
Start: 2024-01-01 | End: 2024-01-01

## 2024-01-01 RX ORDER — ACETAMINOPHEN 325 MG/1
975 TABLET ORAL ONCE
Status: DISCONTINUED | OUTPATIENT
Start: 2024-01-01 | End: 2024-01-01 | Stop reason: HOSPADM

## 2024-01-01 RX ORDER — LIDOCAINE 40 MG/G
CREAM TOPICAL
Status: DISCONTINUED | OUTPATIENT
Start: 2024-01-01 | End: 2024-01-01 | Stop reason: HOSPADM

## 2024-01-01 RX ORDER — PIPERACILLIN SODIUM, TAZOBACTAM SODIUM 3; .375 G/15ML; G/15ML
3.38 INJECTION, POWDER, LYOPHILIZED, FOR SOLUTION INTRAVENOUS EVERY 8 HOURS
Status: DISCONTINUED | OUTPATIENT
Start: 2024-01-01 | End: 2024-01-01

## 2024-01-01 RX ORDER — PROCHLORPERAZINE 25 MG
12.5 SUPPOSITORY, RECTAL RECTAL EVERY 12 HOURS PRN
Status: DISCONTINUED | OUTPATIENT
Start: 2024-01-01 | End: 2024-01-01 | Stop reason: HOSPADM

## 2024-01-01 RX ORDER — ONDANSETRON 2 MG/ML
INJECTION INTRAMUSCULAR; INTRAVENOUS PRN
Status: DISCONTINUED | OUTPATIENT
Start: 2024-01-01 | End: 2024-01-01

## 2024-01-01 RX ORDER — MAGNESIUM SULFATE HEPTAHYDRATE 40 MG/ML
2 INJECTION, SOLUTION INTRAVENOUS ONCE
Status: COMPLETED | OUTPATIENT
Start: 2024-01-01 | End: 2024-01-01

## 2024-01-01 RX ORDER — HYDROMORPHONE HYDROCHLORIDE 1 MG/ML
0.5 INJECTION, SOLUTION INTRAMUSCULAR; INTRAVENOUS; SUBCUTANEOUS
Status: DISCONTINUED | OUTPATIENT
Start: 2024-01-01 | End: 2024-01-01

## 2024-01-01 RX ORDER — LORAZEPAM 2 MG/ML
1 INJECTION INTRAMUSCULAR
Status: DISCONTINUED | OUTPATIENT
Start: 2024-01-01 | End: 2024-01-01 | Stop reason: HOSPADM

## 2024-01-01 RX ORDER — HYDROMORPHONE HCL IN WATER/PF 6 MG/30 ML
0.4 PATIENT CONTROLLED ANALGESIA SYRINGE INTRAVENOUS
Status: DISCONTINUED | OUTPATIENT
Start: 2024-01-01 | End: 2024-01-01

## 2024-01-01 RX ORDER — CALCIUM GLUCONATE 20 MG/ML
2 INJECTION, SOLUTION INTRAVENOUS ONCE
Status: COMPLETED | OUTPATIENT
Start: 2024-01-01 | End: 2024-01-01

## 2024-01-01 RX ORDER — NALOXONE HYDROCHLORIDE 0.4 MG/ML
0.4 INJECTION, SOLUTION INTRAMUSCULAR; INTRAVENOUS; SUBCUTANEOUS
Status: DISCONTINUED | OUTPATIENT
Start: 2024-01-01 | End: 2024-01-01 | Stop reason: HOSPADM

## 2024-01-01 RX ORDER — OXYCODONE HYDROCHLORIDE 5 MG/1
10 TABLET ORAL
Status: DISCONTINUED | OUTPATIENT
Start: 2024-01-01 | End: 2024-01-01 | Stop reason: HOSPADM

## 2024-01-01 RX ORDER — PIPERACILLIN SODIUM, TAZOBACTAM SODIUM 3; .375 G/15ML; G/15ML
3.38 INJECTION, POWDER, LYOPHILIZED, FOR SOLUTION INTRAVENOUS ONCE
Status: COMPLETED | OUTPATIENT
Start: 2024-01-01 | End: 2024-01-01

## 2024-01-01 RX ORDER — ACETAMINOPHEN 325 MG/1
975 TABLET ORAL EVERY 8 HOURS
Status: DISCONTINUED | OUTPATIENT
Start: 2024-01-01 | End: 2024-01-01

## 2024-01-01 RX ORDER — FENTANYL CITRATE 50 UG/ML
25-50 INJECTION, SOLUTION INTRAMUSCULAR; INTRAVENOUS EVERY 5 MIN PRN
Status: DISCONTINUED | OUTPATIENT
Start: 2024-01-01 | End: 2024-01-01 | Stop reason: HOSPADM

## 2024-01-01 RX ORDER — CEFAZOLIN SODIUM/WATER 2 G/20 ML
2 SYRINGE (ML) INTRAVENOUS
Status: COMPLETED | OUTPATIENT
Start: 2024-01-01 | End: 2024-01-01

## 2024-01-01 RX ORDER — HYDROMORPHONE HYDROCHLORIDE 1 MG/ML
0.3 INJECTION, SOLUTION INTRAMUSCULAR; INTRAVENOUS; SUBCUTANEOUS
Status: DISCONTINUED | OUTPATIENT
Start: 2024-01-01 | End: 2024-01-01

## 2024-01-01 RX ORDER — HYDROXYZINE HYDROCHLORIDE 50 MG/1
50 TABLET, FILM COATED ORAL EVERY 6 HOURS PRN
Status: DISCONTINUED | OUTPATIENT
Start: 2024-01-01 | End: 2024-01-01

## 2024-01-01 RX ORDER — GLYCOPYRROLATE 0.2 MG/ML
0.2 INJECTION, SOLUTION INTRAMUSCULAR; INTRAVENOUS EVERY 4 HOURS PRN
Status: DISCONTINUED | OUTPATIENT
Start: 2024-01-01 | End: 2024-01-01 | Stop reason: HOSPADM

## 2024-01-01 RX ORDER — NALOXONE HYDROCHLORIDE 0.4 MG/ML
0.1 INJECTION, SOLUTION INTRAMUSCULAR; INTRAVENOUS; SUBCUTANEOUS
Status: DISCONTINUED | OUTPATIENT
Start: 2024-01-01 | End: 2024-01-01 | Stop reason: HOSPADM

## 2024-01-01 RX ORDER — HYDROMORPHONE HYDROCHLORIDE 1 MG/ML
0.3 INJECTION, SOLUTION INTRAMUSCULAR; INTRAVENOUS; SUBCUTANEOUS EVERY 4 HOURS PRN
Status: DISCONTINUED | OUTPATIENT
Start: 2024-01-01 | End: 2024-01-01

## 2024-01-01 RX ORDER — LEVOTHYROXINE SODIUM ANHYDROUS 100 UG/5ML
50 INJECTION, POWDER, LYOPHILIZED, FOR SOLUTION INTRAVENOUS DAILY
Status: DISCONTINUED | OUTPATIENT
Start: 2024-01-01 | End: 2024-01-01

## 2024-01-01 RX ORDER — HYDROMORPHONE HCL IN WATER/PF 6 MG/30 ML
0.4 PATIENT CONTROLLED ANALGESIA SYRINGE INTRAVENOUS EVERY 5 MIN PRN
Status: DISCONTINUED | OUTPATIENT
Start: 2024-01-01 | End: 2024-01-01

## 2024-01-01 RX ORDER — ONDANSETRON 2 MG/ML
4 INJECTION INTRAMUSCULAR; INTRAVENOUS EVERY 30 MIN PRN
Status: DISCONTINUED | OUTPATIENT
Start: 2024-01-01 | End: 2024-01-01

## 2024-01-01 RX ORDER — ACETAMINOPHEN 325 MG/1
650 TABLET ORAL EVERY 4 HOURS PRN
Status: DISCONTINUED | OUTPATIENT
Start: 2024-01-08 | End: 2024-01-01

## 2024-01-01 RX ORDER — KETAMINE HYDROCHLORIDE 10 MG/ML
INJECTION INTRAMUSCULAR; INTRAVENOUS PRN
Status: DISCONTINUED | OUTPATIENT
Start: 2024-01-01 | End: 2024-01-01

## 2024-01-01 RX ORDER — OXYCODONE HYDROCHLORIDE 5 MG/1
5 TABLET ORAL
Status: DISCONTINUED | OUTPATIENT
Start: 2024-01-01 | End: 2024-01-01 | Stop reason: HOSPADM

## 2024-01-01 RX ORDER — ONDANSETRON 4 MG/1
4 TABLET, ORALLY DISINTEGRATING ORAL EVERY 6 HOURS PRN
Status: DISCONTINUED | OUTPATIENT
Start: 2024-01-01 | End: 2024-01-01 | Stop reason: HOSPADM

## 2024-01-01 RX ORDER — NOREPINEPHRINE BITARTRATE 0.02 MG/ML
.01-.6 INJECTION, SOLUTION INTRAVENOUS CONTINUOUS
Status: DISCONTINUED | OUTPATIENT
Start: 2024-01-01 | End: 2024-01-01

## 2024-01-01 RX ORDER — PROPOFOL 10 MG/ML
INJECTION, EMULSION INTRAVENOUS CONTINUOUS PRN
Status: DISCONTINUED | OUTPATIENT
Start: 2024-01-01 | End: 2024-01-01

## 2024-01-01 RX ORDER — CEFAZOLIN SODIUM 1 G/50ML
750 SOLUTION INTRAVENOUS EVERY 24 HOURS
Status: DISCONTINUED | OUTPATIENT
Start: 2024-01-01 | End: 2024-01-01

## 2024-01-01 RX ORDER — HYDROMORPHONE HYDROCHLORIDE 1 MG/ML
INJECTION, SOLUTION INTRAMUSCULAR; INTRAVENOUS; SUBCUTANEOUS
Status: COMPLETED
Start: 2024-01-01 | End: 2024-01-01

## 2024-01-01 RX ORDER — HALOPERIDOL 5 MG/ML
2 INJECTION INTRAMUSCULAR
Status: DISCONTINUED | OUTPATIENT
Start: 2024-01-01 | End: 2024-01-01 | Stop reason: HOSPADM

## 2024-01-01 RX ORDER — DEXAMETHASONE SODIUM PHOSPHATE 10 MG/ML
INJECTION, SOLUTION INTRAMUSCULAR; INTRAVENOUS PRN
Status: DISCONTINUED | OUTPATIENT
Start: 2024-01-01 | End: 2024-01-01

## 2024-01-01 RX ORDER — IPRATROPIUM BROMIDE AND ALBUTEROL SULFATE 2.5; .5 MG/3ML; MG/3ML
3 SOLUTION RESPIRATORY (INHALATION)
Status: DISCONTINUED | OUTPATIENT
Start: 2024-01-01 | End: 2024-01-01

## 2024-01-01 RX ORDER — ACETAMINOPHEN 325 MG/1
650 TABLET ORAL EVERY 4 HOURS PRN
Status: DISCONTINUED | OUTPATIENT
Start: 2024-01-01 | End: 2024-01-01

## 2024-01-01 RX ORDER — HYDROMORPHONE HYDROCHLORIDE 1 MG/ML
0.3 INJECTION, SOLUTION INTRAMUSCULAR; INTRAVENOUS; SUBCUTANEOUS
Status: DISCONTINUED | OUTPATIENT
Start: 2024-01-01 | End: 2024-01-01 | Stop reason: HOSPADM

## 2024-01-01 RX ORDER — CEFAZOLIN SODIUM 1 G/3ML
INJECTION, POWDER, FOR SOLUTION INTRAMUSCULAR; INTRAVENOUS PRN
Status: DISCONTINUED | OUTPATIENT
Start: 2024-01-01 | End: 2024-01-01

## 2024-01-01 RX ORDER — LIDOCAINE 40 MG/G
CREAM TOPICAL
Status: DISCONTINUED | OUTPATIENT
Start: 2024-01-01 | End: 2024-01-01

## 2024-01-01 RX ORDER — HYDROMORPHONE HCL IN WATER/PF 6 MG/30 ML
0.2 PATIENT CONTROLLED ANALGESIA SYRINGE INTRAVENOUS EVERY 5 MIN PRN
Status: DISCONTINUED | OUTPATIENT
Start: 2024-01-01 | End: 2024-01-01

## 2024-01-01 RX ORDER — VASOPRESSIN IN 0.9 % NACL 2 UNIT/2ML
SYRINGE (ML) INTRAVENOUS PRN
Status: DISCONTINUED | OUTPATIENT
Start: 2024-01-01 | End: 2024-01-01

## 2024-01-01 RX ORDER — ONDANSETRON 2 MG/ML
4 INJECTION INTRAMUSCULAR; INTRAVENOUS EVERY 6 HOURS PRN
Status: DISCONTINUED | OUTPATIENT
Start: 2024-01-01 | End: 2024-01-01 | Stop reason: HOSPADM

## 2024-01-01 RX ORDER — HYDROXYZINE HYDROCHLORIDE 25 MG/1
25 TABLET, FILM COATED ORAL EVERY 6 HOURS PRN
Status: DISCONTINUED | OUTPATIENT
Start: 2024-01-01 | End: 2024-01-01

## 2024-01-01 RX ORDER — SODIUM CHLORIDE, SODIUM LACTATE, POTASSIUM CHLORIDE, CALCIUM CHLORIDE 600; 310; 30; 20 MG/100ML; MG/100ML; MG/100ML; MG/100ML
INJECTION, SOLUTION INTRAVENOUS CONTINUOUS
Status: DISCONTINUED | OUTPATIENT
Start: 2024-01-01 | End: 2024-01-01 | Stop reason: HOSPADM

## 2024-01-01 RX ORDER — LIDOCAINE HYDROCHLORIDE 20 MG/ML
5 JELLY TOPICAL
Status: DISCONTINUED | OUTPATIENT
Start: 2024-01-01 | End: 2024-01-01 | Stop reason: HOSPADM

## 2024-01-01 RX ADMIN — INSULIN ASPART 1 UNITS: 100 INJECTION, SOLUTION INTRAVENOUS; SUBCUTANEOUS at 17:01

## 2024-01-01 RX ADMIN — ACETAMINOPHEN 650 MG: 325 TABLET ORAL at 09:29

## 2024-01-01 RX ADMIN — ROCURONIUM BROMIDE 50 MG: 50 INJECTION, SOLUTION INTRAVENOUS at 10:00

## 2024-01-01 RX ADMIN — FENTANYL CITRATE 100 MCG: 50 INJECTION INTRAMUSCULAR; INTRAVENOUS at 18:56

## 2024-01-01 RX ADMIN — SUGAMMADEX 130 MG: 100 INJECTION, SOLUTION INTRAVENOUS at 12:50

## 2024-01-01 RX ADMIN — MIRTAZAPINE 15 MG: 15 TABLET, FILM COATED ORAL at 20:56

## 2024-01-01 RX ADMIN — ALBUTEROL SULFATE 1 PUFF: 90 INHALANT RESPIRATORY (INHALATION) at 08:48

## 2024-01-01 RX ADMIN — HYDROMORPHONE HYDROCHLORIDE 0.1 MG: 0.2 INJECTION, SOLUTION INTRAMUSCULAR; INTRAVENOUS; SUBCUTANEOUS at 04:12

## 2024-01-01 RX ADMIN — MIRTAZAPINE 15 MG: 15 TABLET, FILM COATED ORAL at 22:47

## 2024-01-01 RX ADMIN — HYDROCORTISONE SODIUM SUCCINATE 100 MG: 100 INJECTION, POWDER, FOR SOLUTION INTRAMUSCULAR; INTRAVENOUS at 20:07

## 2024-01-01 RX ADMIN — PHENYLEPHRINE HYDROCHLORIDE 100 MCG: 10 INJECTION INTRAVENOUS at 10:30

## 2024-01-01 RX ADMIN — SODIUM CHLORIDE: 0.9 INJECTION, SOLUTION INTRAVENOUS at 10:10

## 2024-01-01 RX ADMIN — PIPERACILLIN AND TAZOBACTAM 3.38 G: 3; .375 INJECTION, POWDER, FOR SOLUTION INTRAVENOUS at 23:00

## 2024-01-01 RX ADMIN — SODIUM CHLORIDE, POTASSIUM CHLORIDE, SODIUM LACTATE AND CALCIUM CHLORIDE: 600; 310; 30; 20 INJECTION, SOLUTION INTRAVENOUS at 12:16

## 2024-01-01 RX ADMIN — ALBUTEROL SULFATE 2 PUFF: 90 INHALANT RESPIRATORY (INHALATION) at 09:05

## 2024-01-01 RX ADMIN — HYDROMORPHONE HYDROCHLORIDE 0.2 MG: 0.2 INJECTION, SOLUTION INTRAMUSCULAR; INTRAVENOUS; SUBCUTANEOUS at 08:12

## 2024-01-01 RX ADMIN — CALCIUM CHLORIDE 250 MG: 100 INJECTION INTRAVENOUS; INTRAVENTRICULAR at 19:21

## 2024-01-01 RX ADMIN — ALBUTEROL SULFATE 1 PUFF: 90 INHALANT RESPIRATORY (INHALATION) at 13:53

## 2024-01-01 RX ADMIN — SUGAMMADEX 200 MG: 100 INJECTION, SOLUTION INTRAVENOUS at 19:59

## 2024-01-01 RX ADMIN — HYDROMORPHONE HYDROCHLORIDE 0.2 MG: 0.2 INJECTION, SOLUTION INTRAMUSCULAR; INTRAVENOUS; SUBCUTANEOUS at 04:45

## 2024-01-01 RX ADMIN — POTASSIUM CHLORIDE, DEXTROSE MONOHYDRATE AND SODIUM CHLORIDE: 150; 5; 900 INJECTION, SOLUTION INTRAVENOUS at 12:25

## 2024-01-01 RX ADMIN — PIPERACILLIN AND TAZOBACTAM 3.38 G: 3; .375 INJECTION, POWDER, FOR SOLUTION INTRAVENOUS at 18:32

## 2024-01-01 RX ADMIN — ONDANSETRON 4 MG: 2 INJECTION INTRAMUSCULAR; INTRAVENOUS at 19:15

## 2024-01-01 RX ADMIN — PROPOFOL 30 MG: 10 INJECTION, EMULSION INTRAVENOUS at 19:36

## 2024-01-01 RX ADMIN — SODIUM BICARBONATE 50 MEQ: 84 INJECTION, SOLUTION INTRAVENOUS at 20:05

## 2024-01-01 RX ADMIN — HYDROMORPHONE HYDROCHLORIDE 0.3 MG: 1 INJECTION, SOLUTION INTRAMUSCULAR; INTRAVENOUS; SUBCUTANEOUS at 07:45

## 2024-01-01 RX ADMIN — ACETAMINOPHEN 650 MG: 325 TABLET ORAL at 08:31

## 2024-01-01 RX ADMIN — ALBUTEROL SULFATE 2 PUFF: 90 INHALANT RESPIRATORY (INHALATION) at 21:29

## 2024-01-01 RX ADMIN — ALBUTEROL SULFATE 2 PUFF: 90 INHALANT RESPIRATORY (INHALATION) at 13:31

## 2024-01-01 RX ADMIN — ALBUTEROL SULFATE 1 PUFF: 90 INHALANT RESPIRATORY (INHALATION) at 09:18

## 2024-01-01 RX ADMIN — PROPOFOL 40 MG: 10 INJECTION, EMULSION INTRAVENOUS at 12:26

## 2024-01-01 RX ADMIN — SODIUM CHLORIDE, POTASSIUM CHLORIDE, SODIUM LACTATE AND CALCIUM CHLORIDE 500 ML: 600; 310; 30; 20 INJECTION, SOLUTION INTRAVENOUS at 18:33

## 2024-01-01 RX ADMIN — INSULIN ASPART 1 UNITS: 100 INJECTION, SOLUTION INTRAVENOUS; SUBCUTANEOUS at 10:34

## 2024-01-01 RX ADMIN — DEXAMETHASONE SODIUM PHOSPHATE 10 MG: 10 INJECTION, SOLUTION INTRAMUSCULAR; INTRAVENOUS at 10:00

## 2024-01-01 RX ADMIN — CALCIUM CHLORIDE INJECTION 250 MG: 100 INJECTION, SOLUTION INTRAVENOUS at 10:44

## 2024-01-01 RX ADMIN — HYDROXYZINE HYDROCHLORIDE 25 MG: 25 TABLET, FILM COATED ORAL at 00:30

## 2024-01-01 RX ADMIN — ALBUTEROL SULFATE 1 PUFF: 90 INHALANT RESPIRATORY (INHALATION) at 21:01

## 2024-01-01 RX ADMIN — MAGNESIUM SULFATE HEPTAHYDRATE 2 G: 40 INJECTION, SOLUTION INTRAVENOUS at 08:48

## 2024-01-01 RX ADMIN — PHENYLEPHRINE HYDROCHLORIDE 150 MCG: 10 INJECTION INTRAVENOUS at 10:07

## 2024-01-01 RX ADMIN — SERTRALINE HYDROCHLORIDE 50 MG: 50 TABLET ORAL at 09:17

## 2024-01-01 RX ADMIN — LORAZEPAM 1 MG: 2 INJECTION INTRAMUSCULAR; INTRAVENOUS at 11:30

## 2024-01-01 RX ADMIN — CALCIUM GLUCONATE 1 G: 20 INJECTION, SOLUTION INTRAVENOUS at 20:12

## 2024-01-01 RX ADMIN — FENTANYL CITRATE 25 MCG: 50 INJECTION, SOLUTION INTRAMUSCULAR; INTRAVENOUS at 17:13

## 2024-01-01 RX ADMIN — PANTOPRAZOLE SODIUM 40 MG: 40 INJECTION, POWDER, FOR SOLUTION INTRAVENOUS at 11:31

## 2024-01-01 RX ADMIN — LEVOTHYROXINE SODIUM 75 MCG: 0.03 TABLET ORAL at 06:14

## 2024-01-01 RX ADMIN — ALBUTEROL SULFATE 2 PUFF: 90 INHALANT RESPIRATORY (INHALATION) at 09:33

## 2024-01-01 RX ADMIN — LEVOTHYROXINE SODIUM ANHYDROUS 50 MCG: 100 INJECTION, POWDER, LYOPHILIZED, FOR SOLUTION INTRAVENOUS at 20:51

## 2024-01-01 RX ADMIN — IOHEXOL 115 ML: 350 INJECTION, SOLUTION INTRAVENOUS at 17:58

## 2024-01-01 RX ADMIN — VANCOMYCIN HYDROCHLORIDE 1000 MG: 1 INJECTION, SOLUTION INTRAVENOUS at 20:08

## 2024-01-01 RX ADMIN — HYDROCORTISONE SODIUM SUCCINATE 100 MG: 100 INJECTION, POWDER, FOR SOLUTION INTRAMUSCULAR; INTRAVENOUS at 04:11

## 2024-01-01 RX ADMIN — MIDAZOLAM HYDROCHLORIDE 0.5 MG: 1 INJECTION, SOLUTION INTRAMUSCULAR; INTRAVENOUS at 17:12

## 2024-01-01 RX ADMIN — CALCIUM CHLORIDE INJECTION 250 MG: 100 INJECTION, SOLUTION INTRAVENOUS at 11:01

## 2024-01-01 RX ADMIN — SODIUM BICARBONATE 50 MEQ: 84 INJECTION INTRAVENOUS at 19:40

## 2024-01-01 RX ADMIN — Medication 0.5 UNITS: at 19:08

## 2024-01-01 RX ADMIN — SERTRALINE HYDROCHLORIDE 50 MG: 50 TABLET ORAL at 09:34

## 2024-01-01 RX ADMIN — HYDROMORPHONE HYDROCHLORIDE 0.5 MG: 1 INJECTION, SOLUTION INTRAMUSCULAR; INTRAVENOUS; SUBCUTANEOUS at 09:59

## 2024-01-01 RX ADMIN — ACETAMINOPHEN 975 MG: 325 TABLET ORAL at 00:29

## 2024-01-01 RX ADMIN — HYDROMORPHONE HYDROCHLORIDE 0.5 MG: 1 INJECTION, SOLUTION INTRAMUSCULAR; INTRAVENOUS; SUBCUTANEOUS at 11:33

## 2024-01-01 RX ADMIN — ALBUTEROL SULFATE 1 PUFF: 90 INHALANT RESPIRATORY (INHALATION) at 22:03

## 2024-01-01 RX ADMIN — ALBUTEROL SULFATE 2 PUFF: 90 INHALANT RESPIRATORY (INHALATION) at 14:44

## 2024-01-01 RX ADMIN — HALOPERIDOL LACTATE 2 MG: 5 INJECTION, SOLUTION INTRAMUSCULAR at 12:50

## 2024-01-01 RX ADMIN — ACETAMINOPHEN 975 MG: 325 TABLET ORAL at 09:04

## 2024-01-01 RX ADMIN — NOREPINEPHRINE BITARTRATE 0.03 MCG/KG/MIN: 0.02 INJECTION, SOLUTION INTRAVENOUS at 20:23

## 2024-01-01 RX ADMIN — CALCIUM GLUCONATE 1 G: 20 INJECTION, SOLUTION INTRAVENOUS at 11:22

## 2024-01-01 RX ADMIN — PHENYLEPHRINE HYDROCHLORIDE 100 MCG: 10 INJECTION INTRAVENOUS at 11:51

## 2024-01-01 RX ADMIN — DEXAMETHASONE SODIUM PHOSPHATE 4 MG: 4 INJECTION, SOLUTION INTRA-ARTICULAR; INTRALESIONAL; INTRAMUSCULAR; INTRAVENOUS; SOFT TISSUE at 19:15

## 2024-01-01 RX ADMIN — ATORVASTATIN CALCIUM 80 MG: 40 TABLET, FILM COATED ORAL at 20:33

## 2024-01-01 RX ADMIN — SODIUM CHLORIDE, POTASSIUM CHLORIDE, SODIUM LACTATE AND CALCIUM CHLORIDE: 600; 310; 30; 20 INJECTION, SOLUTION INTRAVENOUS at 10:05

## 2024-01-01 RX ADMIN — INSULIN ASPART 1 UNITS: 100 INJECTION, SOLUTION INTRAVENOUS; SUBCUTANEOUS at 21:48

## 2024-01-01 RX ADMIN — ALBUTEROL SULFATE 1 PUFF: 90 INHALANT RESPIRATORY (INHALATION) at 13:55

## 2024-01-01 RX ADMIN — INSULIN ASPART 1 UNITS: 100 INJECTION, SOLUTION INTRAVENOUS; SUBCUTANEOUS at 03:00

## 2024-01-01 RX ADMIN — ALBUTEROL SULFATE 2.5 MG: 2.5 SOLUTION RESPIRATORY (INHALATION) at 00:15

## 2024-01-01 RX ADMIN — LEVOTHYROXINE SODIUM 75 MCG: 0.03 TABLET ORAL at 06:48

## 2024-01-01 RX ADMIN — INSULIN ASPART 1 UNITS: 100 INJECTION, SOLUTION INTRAVENOUS; SUBCUTANEOUS at 14:34

## 2024-01-01 RX ADMIN — MIRTAZAPINE 15 MG: 15 TABLET, FILM COATED ORAL at 20:34

## 2024-01-01 RX ADMIN — ATORVASTATIN CALCIUM 80 MG: 40 TABLET, FILM COATED ORAL at 21:29

## 2024-01-01 RX ADMIN — HYDROMORPHONE HYDROCHLORIDE 1 MG: 1 INJECTION, SOLUTION INTRAMUSCULAR; INTRAVENOUS; SUBCUTANEOUS at 20:05

## 2024-01-01 RX ADMIN — INSULIN ASPART 1 UNITS: 100 INJECTION, SOLUTION INTRAVENOUS; SUBCUTANEOUS at 10:30

## 2024-01-01 RX ADMIN — HYDROMORPHONE HYDROCHLORIDE 0.1 MG: 0.2 INJECTION, SOLUTION INTRAMUSCULAR; INTRAVENOUS; SUBCUTANEOUS at 10:45

## 2024-01-01 RX ADMIN — KETAMINE HYDROCHLORIDE 20 MG: 10 INJECTION INTRAMUSCULAR; INTRAVENOUS at 19:08

## 2024-01-01 RX ADMIN — HYDROMORPHONE HYDROCHLORIDE 0.3 MG: 1 INJECTION, SOLUTION INTRAMUSCULAR; INTRAVENOUS; SUBCUTANEOUS at 22:58

## 2024-01-01 RX ADMIN — ACETAMINOPHEN 975 MG: 325 TABLET ORAL at 16:23

## 2024-01-01 RX ADMIN — LEVOTHYROXINE SODIUM 75 MCG: 0.03 TABLET ORAL at 06:02

## 2024-01-01 RX ADMIN — HYDROMORPHONE HYDROCHLORIDE 0.5 MG: 1 INJECTION, SOLUTION INTRAMUSCULAR; INTRAVENOUS; SUBCUTANEOUS at 12:25

## 2024-01-01 RX ADMIN — PHENYLEPHRINE HYDROCHLORIDE 0.5 MCG/KG/MIN: 10 INJECTION INTRAVENOUS at 19:02

## 2024-01-01 RX ADMIN — LEVOTHYROXINE SODIUM 75 MCG: 0.03 TABLET ORAL at 04:41

## 2024-01-01 RX ADMIN — POTASSIUM CHLORIDE, DEXTROSE MONOHYDRATE AND SODIUM CHLORIDE: 150; 5; 450 INJECTION, SOLUTION INTRAVENOUS at 02:57

## 2024-01-01 RX ADMIN — PRIMIDONE 100 MG: 50 TABLET ORAL at 21:30

## 2024-01-01 RX ADMIN — MIDAZOLAM HYDROCHLORIDE 0.5 MG: 1 INJECTION, SOLUTION INTRAMUSCULAR; INTRAVENOUS at 17:35

## 2024-01-01 RX ADMIN — ROCURONIUM BROMIDE 40 MG: 50 INJECTION, SOLUTION INTRAVENOUS at 18:56

## 2024-01-01 RX ADMIN — PROPOFOL 100 MG: 10 INJECTION, EMULSION INTRAVENOUS at 09:58

## 2024-01-01 RX ADMIN — SODIUM CHLORIDE, POTASSIUM CHLORIDE, SODIUM LACTATE AND CALCIUM CHLORIDE: 600; 310; 30; 20 INJECTION, SOLUTION INTRAVENOUS at 12:26

## 2024-01-01 RX ADMIN — FENTANYL CITRATE 100 MCG: 50 INJECTION INTRAMUSCULAR; INTRAVENOUS at 09:58

## 2024-01-01 RX ADMIN — SODIUM BICARBONATE: 84 INJECTION, SOLUTION INTRAVENOUS at 20:05

## 2024-01-01 RX ADMIN — PHENYLEPHRINE HYDROCHLORIDE 200 MCG: 10 INJECTION INTRAVENOUS at 18:56

## 2024-01-01 RX ADMIN — ACETAMINOPHEN 650 MG: 325 TABLET ORAL at 22:23

## 2024-01-01 RX ADMIN — ALBUTEROL SULFATE 1 PUFF: 90 INHALANT RESPIRATORY (INHALATION) at 14:34

## 2024-01-01 RX ADMIN — SODIUM CHLORIDE, POTASSIUM CHLORIDE, SODIUM LACTATE AND CALCIUM CHLORIDE: 600; 310; 30; 20 INJECTION, SOLUTION INTRAVENOUS at 00:57

## 2024-01-01 RX ADMIN — ATORVASTATIN CALCIUM 80 MG: 40 TABLET, FILM COATED ORAL at 22:47

## 2024-01-01 RX ADMIN — PHENYLEPHRINE HYDROCHLORIDE 100 MCG: 10 INJECTION INTRAVENOUS at 11:47

## 2024-01-01 RX ADMIN — PRIMIDONE 100 MG: 50 TABLET ORAL at 20:57

## 2024-01-01 RX ADMIN — PHYTONADIONE 10 MG: 10 INJECTION, EMULSION INTRAMUSCULAR; INTRAVENOUS; SUBCUTANEOUS at 00:19

## 2024-01-01 RX ADMIN — POLYETHYLENE GLYCOL 3350, SODIUM SULFATE ANHYDROUS, SODIUM BICARBONATE, SODIUM CHLORIDE, POTASSIUM CHLORIDE 4000 ML: 236; 22.74; 6.74; 5.86; 2.97 POWDER, FOR SOLUTION ORAL at 16:05

## 2024-01-01 RX ADMIN — PHENYLEPHRINE HYDROCHLORIDE 150 MCG: 10 INJECTION INTRAVENOUS at 10:17

## 2024-01-01 RX ADMIN — PHENYLEPHRINE HYDROCHLORIDE 200 MCG: 10 INJECTION INTRAVENOUS at 10:41

## 2024-01-01 RX ADMIN — CEFAZOLIN 2 G: 1 INJECTION, POWDER, FOR SOLUTION INTRAMUSCULAR; INTRAVENOUS at 18:52

## 2024-01-01 RX ADMIN — INSULIN ASPART 1 UNITS: 100 INJECTION, SOLUTION INTRAVENOUS; SUBCUTANEOUS at 06:20

## 2024-01-01 RX ADMIN — PRIMIDONE 100 MG: 50 TABLET ORAL at 09:04

## 2024-01-01 RX ADMIN — PRIMIDONE 100 MG: 50 TABLET ORAL at 08:49

## 2024-01-01 RX ADMIN — POTASSIUM CHLORIDE, DEXTROSE MONOHYDRATE AND SODIUM CHLORIDE: 150; 5; 900 INJECTION, SOLUTION INTRAVENOUS at 16:27

## 2024-01-01 RX ADMIN — SERTRALINE HYDROCHLORIDE 50 MG: 50 TABLET ORAL at 08:48

## 2024-01-01 RX ADMIN — LIDOCAINE HYDROCHLORIDE 2.5 ML: 10 INJECTION, SOLUTION INFILTRATION; PERINEURAL at 16:30

## 2024-01-01 RX ADMIN — PROPOFOL 150 MCG/KG/MIN: 10 INJECTION, EMULSION INTRAVENOUS at 10:42

## 2024-01-01 RX ADMIN — HYDROMORPHONE HYDROCHLORIDE 1 MG: 1 INJECTION, SOLUTION INTRAMUSCULAR; INTRAVENOUS; SUBCUTANEOUS at 20:02

## 2024-01-01 RX ADMIN — ONDANSETRON 4 MG: 2 INJECTION INTRAMUSCULAR; INTRAVENOUS at 18:27

## 2024-01-01 RX ADMIN — LEVOTHYROXINE SODIUM 75 MCG: 0.03 TABLET ORAL at 05:44

## 2024-01-01 RX ADMIN — ACETAMINOPHEN 975 MG: 325 TABLET ORAL at 09:34

## 2024-01-01 RX ADMIN — PRIMIDONE 100 MG: 50 TABLET ORAL at 22:47

## 2024-01-01 RX ADMIN — IPRATROPIUM BROMIDE AND ALBUTEROL SULFATE 3 ML: .5; 3 SOLUTION RESPIRATORY (INHALATION) at 21:23

## 2024-01-01 RX ADMIN — HYDROMORPHONE HYDROCHLORIDE 0.5 MG: 1 INJECTION, SOLUTION INTRAMUSCULAR; INTRAVENOUS; SUBCUTANEOUS at 12:16

## 2024-01-01 RX ADMIN — ACETAMINOPHEN 975 MG: 325 TABLET ORAL at 16:26

## 2024-01-01 RX ADMIN — HYDROMORPHONE HYDROCHLORIDE 0.3 MG: 1 INJECTION, SOLUTION INTRAMUSCULAR; INTRAVENOUS; SUBCUTANEOUS at 03:50

## 2024-01-01 RX ADMIN — LIDOCAINE HYDROCHLORIDE 5 ML: 10 INJECTION, SOLUTION INFILTRATION; PERINEURAL at 18:56

## 2024-01-01 RX ADMIN — CALCIUM CHLORIDE INJECTION 250 MG: 100 INJECTION, SOLUTION INTRAVENOUS at 10:56

## 2024-01-01 RX ADMIN — HYDROMORPHONE HYDROCHLORIDE 0.2 MG: 0.2 INJECTION, SOLUTION INTRAMUSCULAR; INTRAVENOUS; SUBCUTANEOUS at 18:36

## 2024-01-01 RX ADMIN — SODIUM CHLORIDE, POTASSIUM CHLORIDE, SODIUM LACTATE AND CALCIUM CHLORIDE: 600; 310; 30; 20 INJECTION, SOLUTION INTRAVENOUS at 13:55

## 2024-01-01 RX ADMIN — PRIMIDONE 100 MG: 50 TABLET ORAL at 20:34

## 2024-01-01 RX ADMIN — LORAZEPAM 1 MG: 2 INJECTION INTRAMUSCULAR; INTRAVENOUS at 12:30

## 2024-01-01 RX ADMIN — HYDROMORPHONE HYDROCHLORIDE 0.5 MG: 1 INJECTION, SOLUTION INTRAMUSCULAR; INTRAVENOUS; SUBCUTANEOUS at 06:32

## 2024-01-01 RX ADMIN — ATORVASTATIN CALCIUM 80 MG: 40 TABLET, FILM COATED ORAL at 20:55

## 2024-01-01 RX ADMIN — MIRTAZAPINE 15 MG: 15 TABLET, FILM COATED ORAL at 21:30

## 2024-01-01 RX ADMIN — ALBUTEROL SULFATE 1 PUFF: 90 INHALANT RESPIRATORY (INHALATION) at 20:36

## 2024-01-01 RX ADMIN — SERTRALINE HYDROCHLORIDE 50 MG: 50 TABLET ORAL at 08:31

## 2024-01-01 RX ADMIN — PRIMIDONE 100 MG: 50 TABLET ORAL at 08:31

## 2024-01-01 RX ADMIN — PHENYLEPHRINE HYDROCHLORIDE 0.5 MCG/KG/MIN: 10 INJECTION INTRAVENOUS at 10:00

## 2024-01-01 RX ADMIN — PRIMIDONE 100 MG: 50 TABLET ORAL at 09:18

## 2024-01-01 RX ADMIN — CALCIUM GLUCONATE 2 G: 20 INJECTION, SOLUTION INTRAVENOUS at 18:07

## 2024-01-01 RX ADMIN — ALBUTEROL SULFATE 2.5 MG: 2.5 SOLUTION RESPIRATORY (INHALATION) at 23:43

## 2024-01-01 RX ADMIN — PRIMIDONE 100 MG: 50 TABLET ORAL at 10:05

## 2024-01-01 RX ADMIN — ONDANSETRON 4 MG: 2 INJECTION INTRAMUSCULAR; INTRAVENOUS at 12:50

## 2024-01-01 RX ADMIN — SODIUM CHLORIDE, POTASSIUM CHLORIDE, SODIUM LACTATE AND CALCIUM CHLORIDE: 600; 310; 30; 20 INJECTION, SOLUTION INTRAVENOUS at 18:50

## 2024-01-01 RX ADMIN — CALCIUM CHLORIDE INJECTION 250 MG: 100 INJECTION, SOLUTION INTRAVENOUS at 11:46

## 2024-01-01 RX ADMIN — PANTOPRAZOLE SODIUM 40 MG: 40 INJECTION, POWDER, FOR SOLUTION INTRAVENOUS at 13:55

## 2024-01-01 RX ADMIN — SERTRALINE HYDROCHLORIDE 50 MG: 50 TABLET ORAL at 09:04

## 2024-01-01 RX ADMIN — Medication 2 G: at 09:47

## 2024-01-01 RX ADMIN — HYDROMORPHONE HYDROCHLORIDE 0.2 MG: 0.2 INJECTION, SOLUTION INTRAMUSCULAR; INTRAVENOUS; SUBCUTANEOUS at 16:35

## 2024-01-01 RX ADMIN — PROPOFOL 50 MG: 10 INJECTION, EMULSION INTRAVENOUS at 10:41

## 2024-01-01 RX ADMIN — Medication 50 MEQ: at 20:05

## 2024-01-01 RX ADMIN — INSULIN ASPART 1 UNITS: 100 INJECTION, SOLUTION INTRAVENOUS; SUBCUTANEOUS at 07:17

## 2024-01-01 RX ADMIN — PROPOFOL 70 MG: 10 INJECTION, EMULSION INTRAVENOUS at 18:56

## 2024-01-01 RX ADMIN — HYDROMORPHONE HYDROCHLORIDE 0.5 MG: 1 INJECTION, SOLUTION INTRAMUSCULAR; INTRAVENOUS; SUBCUTANEOUS at 08:37

## 2024-01-01 RX ADMIN — DEXTROSE AND SODIUM CHLORIDE: 5; 900 INJECTION, SOLUTION INTRAVENOUS at 17:52

## 2024-01-01 RX ADMIN — LIDOCAINE HYDROCHLORIDE 5 ML: 10 INJECTION, SOLUTION INFILTRATION; PERINEURAL at 09:58

## 2024-01-01 RX ADMIN — SODIUM CHLORIDE, POTASSIUM CHLORIDE, SODIUM LACTATE AND CALCIUM CHLORIDE 500 ML: 600; 310; 30; 20 INJECTION, SOLUTION INTRAVENOUS at 20:13

## 2024-01-01 RX ADMIN — ALBUTEROL SULFATE 1 PUFF: 90 INHALANT RESPIRATORY (INHALATION) at 08:32

## 2024-01-01 RX ADMIN — SODIUM CHLORIDE, POTASSIUM CHLORIDE, SODIUM LACTATE AND CALCIUM CHLORIDE: 600; 310; 30; 20 INJECTION, SOLUTION INTRAVENOUS at 06:51

## 2024-01-01 RX ADMIN — HYDROMORPHONE HYDROCHLORIDE 0.3 MG: 1 INJECTION, SOLUTION INTRAMUSCULAR; INTRAVENOUS; SUBCUTANEOUS at 20:12

## 2024-01-01 RX ADMIN — INSULIN ASPART 1 UNITS: 100 INJECTION, SOLUTION INTRAVENOUS; SUBCUTANEOUS at 02:15

## 2024-01-01 RX ADMIN — Medication 0.5 UNITS: at 19:06

## 2024-01-01 RX ADMIN — PANTOPRAZOLE SODIUM 40 MG: 40 INJECTION, POWDER, FOR SOLUTION INTRAVENOUS at 11:34

## 2024-01-01 RX ADMIN — HYDROMORPHONE HYDROCHLORIDE 0.5 MG: 1 INJECTION, SOLUTION INTRAMUSCULAR; INTRAVENOUS; SUBCUTANEOUS at 10:05

## 2024-01-01 RX ADMIN — HYDROMORPHONE HYDROCHLORIDE 0.4 MG: 1 INJECTION, SOLUTION INTRAMUSCULAR; INTRAVENOUS; SUBCUTANEOUS at 12:21

## 2024-01-01 RX ADMIN — MICAFUNGIN SODIUM 100 MG: 50 INJECTION, POWDER, LYOPHILIZED, FOR SOLUTION INTRAVENOUS at 20:08

## 2024-01-01 RX ADMIN — PANTOPRAZOLE SODIUM 40 MG: 40 INJECTION, POWDER, FOR SOLUTION INTRAVENOUS at 12:24

## 2024-01-01 ASSESSMENT — ACTIVITIES OF DAILY LIVING (ADL)
ADLS_ACUITY_SCORE: 32
ADLS_ACUITY_SCORE: 32
ADLS_ACUITY_SCORE: 28
ADLS_ACUITY_SCORE: 32
ADLS_ACUITY_SCORE: 20
ADLS_ACUITY_SCORE: 20
ADLS_ACUITY_SCORE: 26
ADLS_ACUITY_SCORE: 26
ADLS_ACUITY_SCORE: 20
ADLS_ACUITY_SCORE: 36
ADLS_ACUITY_SCORE: 26
ADLS_ACUITY_SCORE: 36
ADLS_ACUITY_SCORE: 32
ADLS_ACUITY_SCORE: 32
ADLS_ACUITY_SCORE: 20
ADLS_ACUITY_SCORE: 24
ADLS_ACUITY_SCORE: 30
ADLS_ACUITY_SCORE: 28
ADLS_ACUITY_SCORE: 36
ADLS_ACUITY_SCORE: 38
ADLS_ACUITY_SCORE: 32
ADLS_ACUITY_SCORE: 20
ADLS_ACUITY_SCORE: 38
ADLS_ACUITY_SCORE: 38
ADLS_ACUITY_SCORE: 32
ADLS_ACUITY_SCORE: 20
ADLS_ACUITY_SCORE: 32
ADLS_ACUITY_SCORE: 38
ADLS_ACUITY_SCORE: 26
ADLS_ACUITY_SCORE: 28
ADLS_ACUITY_SCORE: 24
ADLS_ACUITY_SCORE: 28
ADLS_ACUITY_SCORE: 38
ADLS_ACUITY_SCORE: 32
ADLS_ACUITY_SCORE: 36
ADLS_ACUITY_SCORE: 20
ADLS_ACUITY_SCORE: 30
ADLS_ACUITY_SCORE: 30
ADLS_ACUITY_SCORE: 32
ADLS_ACUITY_SCORE: 32
ADLS_ACUITY_SCORE: 30
ADLS_ACUITY_SCORE: 36
ADLS_ACUITY_SCORE: 32
ADLS_ACUITY_SCORE: 32
ADLS_ACUITY_SCORE: 28
ADLS_ACUITY_SCORE: 32
ADLS_ACUITY_SCORE: 30
ADLS_ACUITY_SCORE: 20
ADLS_ACUITY_SCORE: 24
ADLS_ACUITY_SCORE: 36
ADLS_ACUITY_SCORE: 32
ADLS_ACUITY_SCORE: 32
ADLS_ACUITY_SCORE: 20
ADLS_ACUITY_SCORE: 32
ADLS_ACUITY_SCORE: 30
ADLS_ACUITY_SCORE: 20
ADLS_ACUITY_SCORE: 30
ADLS_ACUITY_SCORE: 32
ADLS_ACUITY_SCORE: 38
ADLS_ACUITY_SCORE: 36
ADLS_ACUITY_SCORE: 32
ADLS_ACUITY_SCORE: 26
ADLS_ACUITY_SCORE: 32
ADLS_ACUITY_SCORE: 20
ADLS_ACUITY_SCORE: 36
ADLS_ACUITY_SCORE: 32

## 2024-01-01 ASSESSMENT — COPD QUESTIONNAIRES
COPD: 1
CAT_SEVERITY: MODERATE
CAT_SEVERITY: MODERATE
COPD: 1
COPD: 1

## 2024-01-01 NOTE — PLAN OF CARE
Problem: Anemia  Goal: Anemia Symptom Improvement  1/1/2024 1302 by Annika Mae RN  Outcome: Not Progressing  1/1/2024 1301 by Annika Mae RN  Outcome: Not Progressing  Intervention: Monitor and Manage Anemia  Recent Flowsheet Documentation  Taken 1/1/2024 0923 by Annika Mae RN  Safety Promotion/Fall Prevention:   assistive device/personal items within reach   clutter free environment maintained   nonskid shoes/slippers when out of bed   safety round/check completed   activity supervised     Problem: Gastrointestinal Bleeding  Goal: Hemostasis  Outcome: Not Progressing   Goal Outcome Evaluation:         Patient HGB is trending down. Patient had two bowel movements. First one was a large, loose, maroon with a large blood clot in it. Second and third was loose and maroon in color. Start Golytely at 1600 to midnight, patient should be NPO at midnight for a colonoscopy tomorrow per GI.Annika Crane RN

## 2024-01-01 NOTE — PROGRESS NOTES
[facility]    Medicine Progress Note - Hospitalist Service    Date of Admission:  12/31/2023    Assessment & Plan   Jessica Reynolds is a 78 year old female admitted on 12/31/2023. She has history of urothelial cancer status post resection in early 2023, COPD, HTN, HLD, previous heavy alcohol use here for BRBPR     # Painless bright red blood per rectum  # Acute blood loss anemia  - Patient states she had 2 episodes of bright red blood per rectum since admission  - Hb 12.6 -> 11.0, down trending slowly since admission  -CT showing prominent intramural vascularity of entire colon concerning for angiodysplasia, colonic diverticulosis  -not on any antiplatelets or anticoagulation (naproxen on med list, but not taken recently)  -Seen by GI, appreciate recommendations  -Clear liquid diet, NPO after midnight for colonoscopy tomorrow 01/02  -Monitor Hb q12  -Transfuse if Hb < 7     #Mild J LUIS  -Cr baseline 0.86, here 0.96  -Unsure if acute or if could be related to her recent urological issues  -IV hydration, Monitor Cr     #COPD  -On room air  -Home inhalers     #History of urothelial cancer  -Sees [facility]  -Status post resection of tumor a few months ago  -She states has followup scheduled in April     # Prediabetes  HbA1c 5.7    # Mild thrombocytopenia  Not on chemical DVT prophylaxis  Monitor PC     #Mood, home Zoloft, Remeron  #Hypothyroid, home Synthroid  #Tremor, home primidone  #HLD, home statin  #Hypertension, hold home Lotrel for now    Incidental findings  - Cholelithiasis with multiple tiny gallstones, no cholecystitis nor bile duct dilation  - Multiple pancreatic calcifications compatible with chronic pancreatitis  - Supraumbilical hernia, no obstruction          Diet: Clear Liquid Diet  NPO per Anesthesia Guidelines for Procedure/Surgery Except for: Meds    DVT Prophylaxis: Pneumatic Compression Devices  Torres Catheter: Not present  Lines: None     Cardiac Monitoring:  ACTIVE order. Indication: GIB  Code Status: No CPR- Do NOT Intubate      Clinically Significant Risk Factors Present on Admission                  # Hypertension: Noted on problem list                 Disposition Plan      Expected Discharge Date: 01/02/2024                    Idalia Chambers MD  Hospitalist Service  [facility]  Securely message with [employer] (more info)  Text page via [employer] Paging/Directory   ______________________________________________________________________    Interval History   Patient was examined at the bedside, lying comfortably.  States that she had 2 episodes of bright red bleeding since time she admitted to the hospital.  Scheduled for colonoscopy tomorrow    Physical Exam   Vital Signs: Temp: 98.1  F (36.7  C) Temp src: Oral BP: (!) 128/92 Pulse: 74   Resp: 18 SpO2: 97 % O2 Device: None (Room air)    Weight: 121 lbs 4.05 oz    General: in no apparent distress, non-toxic, and alert female lying in hospital bed oriented x3  CV: Regular rhythm, normal rate, no murmurs  Resp: No wheezes, no rales or rhonchi, no focal consolidations  GI: Belly soft, nondistended, nontender, bowel sounds present. Large grapefruit-sized ventral hernia, reducible and nontender.  Skin:  Telangiectasias on face  Extremities: No peripheral edema  Psych: Normal affect, mood euthymic  Neuro: Grossly normal    Medical Decision Making       35 MINUTES SPENT BY ME on the date of service doing chart review, history, exam, documentation & further activities per the note.      Data     I have personally reviewed the following data over the past 24 hrs:    6.6  \   11.0 (L)   / 143 (L)     140 109 (H) 14.0 /  100 (H)   4.2 24 0.95 \     TSH: N/A T4: N/A A1C: N/A       Imaging results reviewed over the past 24 hrs:   No results found for this or any previous visit (from the past 24 hour(s)).

## 2024-01-01 NOTE — PROGRESS NOTES
Care Management Follow Up    Length of Stay (days): 1    Expected Discharge Date: 01/02/2024    Concerns to be Addressed:  Plan colonoscopy tomorrow;   Patient plan of care discussed at interdisciplinary rounds: Yes    Anticipated Discharge Disposition:  Goal is home.      Anticipated Discharge Services:  None anticipated   Anticipated Discharge DME:  [facility]UL[facility]    Patient/family educated on Medicare website which has current facility and service quality ratings:   NA  Education Provided on the Discharge Plan:   Per team  Patient/Family in Agreement with the Plan:   Yes    Referrals Placed by CM/SW:  [facility]   Private pay costs discussed: Not applicable     Additional Information:  Patient lives with her daughter Isabelle and is independent with activities of daily living at baseline.  No care management needs identified at this time but CM will continue to monitor progression of care, review team recommendations and provide discharge planning assist as needed.      Mari Tejeda RN

## 2024-01-01 NOTE — PLAN OF CARE
Problem: Adult Inpatient Plan of Care  Goal: Optimal Comfort and Wellbeing  Outcome: Progressing     Problem: Risk for Delirium  Goal: Improved Attention and Thought Clarity  Outcome: Progressing  Goal: Improved Sleep  Outcome: Progressing   Goal Outcome Evaluation:    VSS. On tele with NSR.    A/Ox4.    Denied pain and nausea.    Up to bathroom with SBA. Voiding, no BM overnight.     Kept NPO after midnight for endoscopy today.    Expresses frustration with taking medication supplied by hospital, would prefer to take medication from home.     Slept between cares.         Julius Yang RN

## 2024-01-01 NOTE — PROGRESS NOTES
Met with patient  to review role of care management, progression of care and possible need for services at discharge, including OP services, home care, or skilled nursing care. Patient alert, oriented and engaged in the conversation.     Assessed, lives w/dtr Isabelle and dtr will transport. Independent at baseline and no svcs. CM to follow.

## 2024-01-01 NOTE — CONSULTS
"[facility] consult         Name: Jessica Reynolds    Medical Record #: [id number]    YOB: 1945    Date/Time: 1/1/2024/7:13 AM    Reason for Consultation: Idalia Chambers MD has asked me to evaluate Jessica Reynolds regarding rectal bleeding.    HPI: 78-year-old female presented to the ED yesterday with painless bright red blood per rectum.  Reports 2 episodes at home that prompted her to come to the ED.  Noted to be hemodynamically stable.  Labs with initial hemoglobin 12.6--> 11.1.  CTA negative for active GI bleed but did note some prominent intramural vascularity with early draining veins in the colon suggestive of angiodysplasia.  Had 1 bowel movement last evening but none overnight.    Patient reports similar issue with GI bleeding in the remote past for which she required surgical resection for a \" colon vein the size of a pencil\"    Last colonoscopy January 2022 with ileocolonic anastomosis in the ascending colon, diverticulosis in the sigmoid colon and several small polyps removed.  2-year follow-up was recommended.    Review of Systems (ROS): Complete ROS otherwise negative except for as above.    Past Medical History:  Past Medical History:   Diagnosis Date    Anxiety     Arthritis     Bladder mass     Carotid stenosis     right    COPD (chronic obstructive pulmonary disease) (H)     Essential tremor     Hepatic steatosis     History of rectal cancer     Hypertension     Macular degeneration (senile) of retina     Osteoporosis     Rosacea     Thyroid disease     Vitamin D deficiency        Medications:   Medications Prior to Admission   Medication Sig Dispense Refill Last Dose    albuterol (PROAIR HFA) 90 mcg/actuation inhaler Inhale 1-2 puffs into the lungs 3 times daily   12/31/2023 at am    albuterol (PROVENTIL) (2.5 MG/3ML) 0.083% neb solution Take 2.5 mg by nebulization every 6 hours as needed for shortness of breath, wheezing or cough   Past Month    " amLODIPine-benazepril (LOTREL) 10-20 mg per capsule Take 1 capsule by mouth daily   12/31/2023 at am    atorvastatin (LIPITOR) 80 MG tablet Take 80 mg by mouth at bedtime   12/30/2023 at hs    CHOLECALCIFEROL, VITAMIN D3, ORAL Take 2,000 Units by mouth daily   12/31/2023 at am    mirtazapine (REMERON) 15 MG tablet Take 15 mg by mouth at bedtime   12/30/2023 at hs    mometasone-formoterol (DULERA) 200-5 mcg/actuation HFAA inhaler Inhale 2 puffs into the lungs at bedtime   12/30/2023 at hs    Multiple Vitamins-Minerals (PRESERVISION AREDS PO) Take 2 tablets by mouth daily   12/31/2023 at am    naproxen (NAPROSYN) 250 MG tablet Take 1 tablet (250 mg) by mouth every 12 hours as needed for other (Inflammatory pain) 20 tablet 0 Past Month    omega-3-dha-epa-dpa-fish oil (OMEGA-3 2100) 1,050-1,200 mg cap Take 1 capsule by mouth daily   12/31/2023 at am    predniSONE (DELTASONE) 10 MG tablet Take 10 mg by mouth as needed 1-2 tabs orally once a day as needed for COPD flare up for 7 days.   Past Week    primidone (MYSOLINE) 50 MG tablet Take 100 mg by mouth 2 times daily   12/31/2023 at am    sertraline (ZOLOFT) 50 MG tablet Take 50 mg by mouth daily   12/31/2023 at am    SYNTHROID 75 MCG tablet Take 75 mcg by mouth daily   12/31/2023 at am    vitamin C-biotin (HAIR-SKIN-NAILS, VIT C-BIOTIN,) 50 mg -1,250 mcg Chew Take 1 capsule by mouth daily   12/31/2023 at am       Current Facility-Administered Medications:     acetaminophen (TYLENOL) tablet 650 mg, 650 mg, Oral, Q4H PRN **OR** acetaminophen (TYLENOL) Suppository 650 mg, 650 mg, Rectal, Q4H PRN, Carley Russell MD    albuterol (PROVENTIL HFA/VENTOLIN HFA) inhaler, 1-2 puff, Inhalation, TID, Carley Russell MD    albuterol (PROVENTIL) neb solution 2.5 mg, 2.5 mg, Nebulization, Q6H PRN, Carley Russell MD    atorvastatin (LIPITOR) tablet 80 mg, 80 mg, Oral, At Bedtime, Carley Russell MD    levothyroxine (SYNTHROID/LEVOTHROID) tablet 75 mcg, 75 mcg, Oral, Daily, Carley Russell,  MD, 75 mcg at 01/01/24 0602    lidocaine (LMX4) cream, , Topical, Q1H PRN, Carley Russell MD    lidocaine 1 % 0.1-1 mL, 0.1-1 mL, Other, Q1H PRN, Carley Russell MD    melatonin tablet 1 mg, 1 mg, Oral, At Bedtime PRN, Carley Russell MD    mirtazapine (REMERON) tablet 15 mg, 15 mg, Oral, At Bedtime, Carley Russell MD    mometasone-formoterol (DULERA) 200-5 MCG/ACT oral inhaler 2 puff, 2 puff, Inhalation, BID, Carley Russell MD    nicotine (NICORETTE) gum 2 mg, 2 mg, Buccal, Q1H PRN, Carley Russell MD    primidone (MYSOLINE) tablet 100 mg, 100 mg, Oral, BID, Carley Russell MD    senna-docusate (SENOKOT-S/PERICOLACE) 8.6-50 MG per tablet 1 tablet, 1 tablet, Oral, BID PRN **OR** senna-docusate (SENOKOT-S/PERICOLACE) 8.6-50 MG per tablet 2 tablet, 2 tablet, Oral, BID PRN, Carley Russell MD    sertraline (ZOLOFT) tablet 50 mg, 50 mg, Oral, Daily, Carley Russell MD    sodium chloride (PF) 0.9% PF flush 3 mL, 3 mL, Intracatheter, Q8H, Carley Russell MD, 3 mL at 12/31/23 1935    sodium chloride (PF) 0.9% PF flush 3 mL, 3 mL, Intracatheter, q1 min prn, Carley Russell MD    sodium chloride 0.9 % infusion, , Intravenous, Continuous, Carley Russell MD, Last Rate: 50 mL/hr at 12/31/23 1935, New Bag at 12/31/23 1935       Allergies: Bactrim [sulfamethoxazole-trimethoprim] and Sudafed [pseudoephedrine]    Family History:  Family History   Problem Relation Age of Onset    Cerebrovascular Disease Mother     Heart Disease Father     Cerebrovascular Disease Father     Lung Cancer Sister     Diabetes Maternal Grandmother     Substance Abuse Sister        Social History:  Social History     Tobacco Use    Smoking status: Every Day     Packs/day: .5     Types: Cigarettes    Tobacco comments:     4 cigarettes in the past 2 weeks   Substance Use Topics    Alcohol use: Yes     Alcohol/week: 2.0 standard drinks of alcohol     Types: 2 Standard drinks or equivalent per week     Comment: N/A beer every other day, zaid 3-5 drinks er week     "Drug use: No           Physical Exam: /60 (BP Location: Left arm)   Pulse 65   Temp 97.8  F (36.6  C) (Oral)   Resp 18   Ht 1.575 m (5' 2\")   Wt 55 kg (121 lb 4.1 oz)   SpO2 97%   BMI 22.18 kg/m      General: NAD  Eyes: Anicteric  Pulmonary: Normal work of breathing, no accessory muscle use, no audible wheezing  Gastrointestinal: Soft, NT, ND, no rebound or guarding  Skin: No jaundice  Neurologic: Alert and oriented ×3, no focal deficits  Psych: Normal    Labs:    CBC RESULTS:   Recent Labs   Lab Test 01/01/24  0657   WBC 6.6   RBC 3.36*   HGB 11.0*   HCT 31.7*   MCV 94   MCH 32.7   MCHC 34.7   RDW 14.7   *        CMP Results:   Recent Labs   Lab Test 12/31/23  1325      POTASSIUM 4.1   CHLORIDE 101   CO2 26   ANIONGAP 11   *   BUN 11.5   CR 0.96*   BILITOTAL 0.6   ALKPHOS 89   ALT 21   AST 34        INR Results:   Recent Labs   Lab Test 12/31/23  1325   INR 1.10          Radiology: CTA Abdomen Pelvis with Contrast    Result Date: 12/31/2023  EXAM: CTA ABDOMEN PELVIS WITH CONTRAST LOCATION: [facility] DATE: 12/31/2023 INDICATION: GI bleed with dark and bloody stools. COMPARISON: CT 01/20/2023 TECHNIQUE: CT angiogram abdomen pelvis during arterial phase of injection of IV contrast. 2D and 3D MIP reconstructions were performed by the CT technologist. Dose reduction techniques were used. CONTRAST: isovue 370  75ml FINDINGS: ANGIOGRAM ABDOMEN/PELVIS: No evidence of active GI bleed. Prominent intramural vascularity involving segments of the ascending, transverse and descending colon with early draining veins, suggesting angiodysplasia. The celiac, SMA and single left renal artery are widely patent. Moderate origin stenoses of single right renal artery and ELOISA. Moderate aortoiliac atherosclerosis without aneurysm. LOWER CHEST: Heavy mitral annular calcifications. HEPATOBILIARY: Cholelithiasis with multiple tiny gallstones. No cholecystitis nor bile duct dilatation. " Negative liver. PANCREAS: Multiple parenchymal and intraductal stones region of pancreatic head redemonstrated. Largest stone within the distal pancreatic duct measures 5 x 10 mm coronal image 43 series 11. Pancreatic duct is nondilated with no peripancreatic inflammatory change. SPLEEN: Normal. ADRENAL GLANDS: Normal. KIDNEYS/BLADDER: Mild left renal cortical atrophy. No urinary tract calculus nor hydronephrosis. Mild bladder mucosal hyperenhancement with perivesicular edema/inflammation. BOWEL: No evidence of active GI bleed Diverticulosis of the colon. No acute inflammatory change. No obstruction.. Previous appendectomy. LYMPH NODES: No lymphadenopathy. PELVIC ORGANS: Hysterectomy. MUSCULOSKELETAL: Interval increase in right paramidline supraumbilical ventral hernia which now contains mesenteric fat and short segment of sigmoid colon without obstruction. No suspicious osseous lesions.     IMPRESSION: 1.  No evidence of active GI bleed. 2.  Prominent intramural vascularity with early draining veins involving segments of the ascending, transverse and descending colon, suggesting angiodysplasia. 3.  Colonic diverticulosis with no acute inflammatory bowel process nor evidence of obstruction. 4.  Cholelithiasis with multiple tiny gallstones. No cholecystitis nor bile duct dilatation. 5.  Multiple parenchymal and intraductal pancreatic calcifications compatible with sequela from chronic pancreatitis. 6.  Interval enlargement of right paramidline supraumbilical hernia which now contains mesenteric fat and a short segment of transverse colon without obstruction.       Impression:   Painless bright red blood per rectum.  Clinical presentation most suggestive of diverticular bleeding.  Other differentials include AVM, hemorrhoids.  Neoplasm less likely given that she has undergone regular colonoscopies with last one in 2022.    2.  Acute blood loss anemia secondary to #1    Recommendation:   -Monitor hemoglobin and  transfuse as necessary  -Hemodynamic support as necessary  -Clear liquid diet today  -Prep for colonoscopy, n.p.o. after midnight  -Colonoscopy tomorrow      Total visit time = 44 minutes                                                  KELLY Malcolm  Thank you for the opportunity to participate in the care of this patient.   Please feel free to call me with any questions or concerns.  Phone number [phone].

## 2024-01-01 NOTE — PROVIDER NOTIFICATION
Nurse notified Dr. Carley Russell pt refuses to take our supply of medications d/t financial concerns. Pt brought personal supply of medications she takes mixed in a container and has no labels to get processed in pharmacy. Nurse educated to pt to not use home meds tonight as they aren't processed through pharmacy and no prescribed bottles to use. Both inhalers for albuterol and Dulera sent to be processed by pharmacy.

## 2024-01-01 NOTE — H&P
"[facility]    History and Physical - Hospitalist Service       Date of Admission:  2023  Jessica Reynolds,  1945, MRN [id number]  PCP: Shaylee Jacques    Assessment & Plan      Jessica Reynolds is a 78 year old female admitted on 2023. She has history of urothelial cancer status post resection in early , COPD, HTN, HLD, previous heavy alcohol use here for BRBPR    #BRBPR  -3 large episodes today, painless  -CT showing prominent intramural vascularity of entire colon concerning for angiodysplasia  -not on any antiplatelets or anticoagulation (naproxen on med list, but not taken recently)  -reports history of rectal bleed in the distant past due to \"colon vein\" \"the size of a pencil,\" status post exploratory surgery and \"something\" was resected  -GI consultation  -Clear liquid diet, NPO after midnight for likely endoscopy    #Mild J LUIS  -Cr baseline 0.86, here 0.96  -Unsure if acute or if could be related to her recent urological issues  -IVF and recheck in AM    #COPD  -On room air  -Home inhalers    #History of urothelial cancer  -Sees [facility]  -Status post resection of tumor a few months ago  -She states has followup scheduled in April    #Elevated random BG  - on admit  -Check A1c    #Mood, home Zoloft, Remeron  #Hypothyroid, home Synthroid  #Tremor, home primidone  #HLD, home statin  #Hypertension, hold home Lotrel for now         DVT Prophylaxis: Moderate risk. SCDs  Diet: Combination Diet Clear Liquid  NPO per Anesthesia Guidelines for Procedure/Surgery Except for: Meds  Torres Catheter: Not present  Lines: None     Cardiac Monitoring: ACTIVE order. Indication: GIB  Code Status: No CPR- Do NOT Intubate. Discussed and confirmed with patient    Clinically Significant Risk Factors Present on Admission                  # Hypertension: Noted on problem list                 Disposition Plan      Expected Discharge Date: 2024                The " "patient's care was discussed with the Patient.    Carley Russell MD  Hospitalist Service  [facility]  Securely message with [employer] (more info)  Text page via [employer] Paging/Directory     ______________________________________________________________________    Chief Complaint   BRBPR    History is obtained from the patient    History of Present Illness   Jessica Reynolds is a 78 year old female who is here for aforementioned symptoms.   Reports this morning with 2 episodes large BRBPR. Dark, with clots and coffee ground appearance. Painless.  Had in the remote past, reports was due to \"colon vein\" the \"size of a pencil.\" Reports had exploratory surgery for this and \"something\" was resected. Reports normal colonoscopy about 3 years ago.  Feels more tired than usual currently. Otherwise in usual state of health.  Has chronic cough.  Raspy voice, she attributes to inhalers.  Notes does smoke.  Denies fever, chills, bladder issues, chest pain, n/v. Reports normal stools prior to today.    Patient denies any history of rectal cancer.      Past Medical History    Past Medical History:   Diagnosis Date    Anxiety     Arthritis     Bladder mass     Carotid stenosis     right    COPD (chronic obstructive pulmonary disease) (H)     Essential tremor     Hepatic steatosis     History of rectal cancer     Hypertension     Macular degeneration (senile) of retina     Osteoporosis     Rosacea     Thyroid disease     Vitamin D deficiency        Past Surgical History   Past Surgical History:   Procedure Laterality Date    APPENDECTOMY      COMBINED CYSTOSCOPY, RETROGRADES, EXCHANGE STENT URETER(S) Left 3/8/2023    Procedure: CYSTOSCOPY WITH LEFT RETROGRADE PYELOGRAM LEFT URETEROSCOPY, BRUSH AND REGULAR BIOPSY LEFT URETER AND LEFT URETERAL STENT PLACEMENT;  Surgeon: Suresh Samuels MD;  Location: [facility] [place] OR    EYE SURGERY         Prior to Admission Medications   Prior to Admission Medications "   Prescriptions Last Dose Informant Patient Reported? Taking?   CHOLECALCIFEROL, VITAMIN D3, ORAL 12/31/2023 at am  Yes Yes   Sig: Take 2,000 Units by mouth daily   Multiple Vitamins-Minerals (PRESERVISION AREDS PO) 12/31/2023 at am  Yes Yes   Sig: Take 2 tablets by mouth daily   SYNTHROID 75 MCG tablet 12/31/2023 at am  Yes Yes   Sig: Take 75 mcg by mouth daily   albuterol (PROAIR HFA) 90 mcg/actuation inhaler 12/31/2023 at am  Yes Yes   Sig: Inhale 1-2 puffs into the lungs 3 times daily   albuterol (PROVENTIL) (2.5 MG/3ML) 0.083% neb solution Past Month  Yes Yes   Sig: Take 2.5 mg by nebulization every 6 hours as needed for shortness of breath, wheezing or cough   amLODIPine-benazepril (LOTREL) 10-20 mg per capsule 12/31/2023 at am  Yes Yes   Sig: Take 1 capsule by mouth daily   atorvastatin (LIPITOR) 80 MG tablet 12/30/2023 at hs  Yes Yes   Sig: Take 80 mg by mouth at bedtime   mirtazapine (REMERON) 15 MG tablet 12/30/2023 at hs  Yes Yes   Sig: Take 15 mg by mouth at bedtime   mometasone-formoterol (DULERA) 200-5 mcg/actuation HFAA inhaler 12/30/2023 at hs  Yes Yes   Sig: Inhale 2 puffs into the lungs at bedtime   naproxen (NAPROSYN) 250 MG tablet Past Month  No Yes   Sig: Take 1 tablet (250 mg) by mouth every 12 hours as needed for other (Inflammatory pain)   omega-3-dha-epa-dpa-fish oil (OMEGA-3 2100) 1,050-1,200 mg cap 12/31/2023 at am  Yes Yes   Sig: Take 1 capsule by mouth daily   predniSONE (DELTASONE) 10 MG tablet Past Week  Yes Yes   Sig: Take 10 mg by mouth as needed 1-2 tabs orally once a day as needed for COPD flare up for 7 days.   primidone (MYSOLINE) 50 MG tablet 12/31/2023 at am  Yes Yes   Sig: Take 100 mg by mouth 2 times daily   sertraline (ZOLOFT) 50 MG tablet 12/31/2023 at am  Yes Yes   Sig: Take 50 mg by mouth daily   vitamin C-biotin (HAIR-SKIN-NAILS, VIT C-BIOTIN,) 50 mg -1,250 mcg Chew 12/31/2023 at am  Yes Yes   Sig: Take 1 capsule by mouth daily      Facility-Administered Medications:  None           Physical Exam   Vital Signs: Temp: 98.6  F (37  C) Temp src: Oral BP: 138/65 Pulse: 76   Resp: 20 SpO2: 98 % O2 Device: None (Room air)    Weight: 121 lbs 4.05 oz  General: in no apparent distress, non-toxic, and alert female lying in hospital bed oriented x3  HEENT: Head normocephalic atraumatic, oral mucosa tacky. Sclerae  injected  CV: Regular rhythm, normal rate, no murmurs  Resp: No wheezes, no rales or rhonchi, no focal consolidations  GI: Belly soft, nondistended, nontender, bowel sounds present. Large grapefruit-sized ventral hernia, reducible and nontender.  Skin:  Telangiectasias on face  Extremities: No peripheral edema  Psych: Normal affect, mood euthymic  Neuro: Grossly normal    Medical Decision Making                 Data   Imaging results reviewed over the past 24 hrs:   Recent Results (from the past 24 hour(s))   CTA Abdomen Pelvis with Contrast    Narrative    EXAM: CTA ABDOMEN PELVIS WITH CONTRAST  LOCATION: [facility]  DATE: 12/31/2023    INDICATION: GI bleed with dark and bloody stools.  COMPARISON: CT 01/20/2023  TECHNIQUE: CT angiogram abdomen pelvis during arterial phase of injection of IV contrast. 2D and 3D MIP reconstructions were performed by the CT technologist. Dose reduction techniques were used.  CONTRAST: isovue 370  75ml    FINDINGS:  ANGIOGRAM ABDOMEN/PELVIS: No evidence of active GI bleed. Prominent intramural vascularity involving segments of the ascending, transverse and descending colon with early draining veins, suggesting angiodysplasia.    The celiac, SMA and single left renal artery are widely patent. Moderate origin stenoses of single right renal artery and ELOISA. Moderate aortoiliac atherosclerosis without aneurysm.    LOWER CHEST: Heavy mitral annular calcifications.    HEPATOBILIARY: Cholelithiasis with multiple tiny gallstones. No cholecystitis nor bile duct dilatation. Negative liver.    PANCREAS: Multiple parenchymal and  intraductal stones region of pancreatic head redemonstrated. Largest stone within the distal pancreatic duct measures 5 x 10 mm coronal image 43 series 11. Pancreatic duct is nondilated with no peripancreatic   inflammatory change.    SPLEEN: Normal.    ADRENAL GLANDS: Normal.    KIDNEYS/BLADDER: Mild left renal cortical atrophy. No urinary tract calculus nor hydronephrosis. Mild bladder mucosal hyperenhancement with perivesicular edema/inflammation.    BOWEL: No evidence of active GI bleed Diverticulosis of the colon. No acute inflammatory change. No obstruction.. Previous appendectomy.    LYMPH NODES: No lymphadenopathy.    PELVIC ORGANS: Hysterectomy.    MUSCULOSKELETAL: Interval increase in right paramidline supraumbilical ventral hernia which now contains mesenteric fat and short segment of sigmoid colon without obstruction. No suspicious osseous lesions.      Impression    IMPRESSION:  1.  No evidence of active GI bleed.  2.  Prominent intramural vascularity with early draining veins involving segments of the ascending, transverse and descending colon, suggesting angiodysplasia.  3.  Colonic diverticulosis with no acute inflammatory bowel process nor evidence of obstruction.  4.  Cholelithiasis with multiple tiny gallstones. No cholecystitis nor bile duct dilatation.  5.  Multiple parenchymal and intraductal pancreatic calcifications compatible with sequela from chronic pancreatitis.  6.  Interval enlargement of right paramidline supraumbilical hernia which now contains mesenteric fat and a short segment of transverse colon without obstruction.     Recent Results (from the past 12 hour(s))   Basic metabolic panel    Collection Time: 12/31/23  1:25 PM   Result Value Ref Range    Sodium 138 135 - 145 mmol/L    Potassium 4.1 3.4 - 5.3 mmol/L    Chloride 101 98 - 107 mmol/L    Carbon Dioxide (CO2) 26 22 - 29 mmol/L    Anion Gap 11 7 - 15 mmol/L    Urea Nitrogen 11.5 8.0 - 23.0 mg/dL    Creatinine 0.96 (H) 0.51 -  0.95 mg/dL    GFR Estimate 60 (L) >60 mL/min/1.73m2    Calcium 9.1 8.8 - 10.2 mg/dL    Glucose 185 (H) 70 - 99 mg/dL   Hepatic function panel    Collection Time: 12/31/23  1:25 PM   Result Value Ref Range    Protein Total 6.4 6.4 - 8.3 g/dL    Albumin 3.8 3.5 - 5.2 g/dL    Bilirubin Total 0.6 <=1.2 mg/dL    Alkaline Phosphatase 89 40 - 150 U/L    AST 34 0 - 45 U/L    ALT 21 0 - 50 U/L    Bilirubin Direct <0.20 0.00 - 0.30 mg/dL   INR    Collection Time: 12/31/23  1:25 PM   Result Value Ref Range    INR 1.10 0.85 - 1.15   PTT    Collection Time: 12/31/23  1:25 PM   Result Value Ref Range    aPTT 32 22 - 38 Seconds   CBC with platelets and differential    Collection Time: 12/31/23  1:25 PM   Result Value Ref Range    WBC Count 7.7 4.0 - 11.0 10e3/uL    RBC Count 3.82 3.80 - 5.20 10e6/uL    Hemoglobin 12.6 11.7 - 15.7 g/dL    Hematocrit 36.9 35.0 - 47.0 %    MCV 97 78 - 100 fL    MCH 33.0 26.5 - 33.0 pg    MCHC 34.1 31.5 - 36.5 g/dL    RDW 14.6 10.0 - 15.0 %    Platelet Count 166 150 - 450 10e3/uL    % Neutrophils 62 %    % Lymphocytes 23 %    % Monocytes 10 %    % Eosinophils 3 %    % Basophils 1 %    % Immature Granulocytes 1 %    NRBCs per 100 WBC 0 <1 /100    Absolute Neutrophils 4.8 1.6 - 8.3 10e3/uL    Absolute Lymphocytes 1.8 0.8 - 5.3 10e3/uL    Absolute Monocytes 0.8 0.0 - 1.3 10e3/uL    Absolute Eosinophils 0.3 0.0 - 0.7 10e3/uL    Absolute Basophils 0.1 0.0 - 0.2 10e3/uL    Absolute Immature Granulocytes 0.0 <=0.4 10e3/uL    Absolute NRBCs 0.0 10e3/uL   Adult Type and Screen    Collection Time: 12/31/23  1:25 PM   Result Value Ref Range    ABO/RH(D) O POS     Antibody Screen Negative Negative    SPECIMEN EXPIRATION DATE [id number]

## 2024-01-01 NOTE — PLAN OF CARE
Goal Outcome Evaluation:       Pt admitted from ED, AxO x4 VSS on RA. Denies pain. Pt stated frustration with situation in hospital, and also refuses to take hospital supply of medications. Nurse promoted to pt for family to bring bottle of medications from home to process through pharmacy using pts own supply. Sleeping between cares will be NPO midnight. CL diet tolerated.

## 2024-01-02 NOTE — PROCEDURES
Interventional Radiology Post-Procedure Note   ?   Brief Procedure Note:   Patient name: Jessica Reynolds  Pt MRN:[id number]   Date of procedure: 1/2/2024     Procedure(s): Mesenteric angiogram.  Sedation method: Moderate sedation was employed. The patient was monitored by an interventional radiology nurse at all times throughout the procedure under my direct guidance.  Pre Procedure Diagnosis: GI bleeding.  Post Procedure Diagnosis: Same  ?   Attending: Caio Che M.D.  Specimen(s) removed: None   Additional studies ordered: None  Drains: None  Estimated Blood Loss: Minimal  Complications: None  Vascular closure method: Perclose, Right CFA.    Findings/Notes/Comments:   Selective and superselective mesenteric angiography with no identified site of bleeding.  Embolization was therefore not performed.   ?   Please see dictation in PACS or under the Imaging tab in [employer] for detailed procedure note.     Caio Che M.D.   Vascular and Interventional Radiology     1/2/2024  5:55 PM

## 2024-01-02 NOTE — PLAN OF CARE
"Goal Outcome Evaluation:      Plan of Care Reviewed With: patient    Overall Patient Progress: no changeOverall Patient Progress: no change    Outcome Evaluation: Pt alert, oriented, lethargic. Reported having more than 3 bloody BMs yesterday. BP was 85/48, then 97/55. MD aware, gave verbal order to administer LR bolus 500 mL should BP drop again. Denies pain. On tele - NSR.  NPO since midnight for colonoscopy scheduled for today. NS running at 50 mL/hr. Up to BSC, usually SBA but called for help due to SOB upon getting out of bed. Sleeping between cares. Pills whole. Colonoscopy scheduled for 8AM.    /55 (BP Location: Left arm)   Pulse 80   Temp 99.1  F (37.3  C) (Oral)   Resp 14   Ht 1.575 m (5' 2\")   Wt 55 kg (121 lb 4.1 oz)   SpO2 95%   BMI 22.18 kg/m        Rick Mahoney RN    " Left message to call back

## 2024-01-02 NOTE — PROGRESS NOTES
GI procedure note    Two areas of potential bleeding, the second more significant    No blood coming down from olaf-ileum    1st in transverse was area of friable mucosa vs dieulafoy--clips x3    2nd in descending colon likely actively bleeding diverticulum--clips x2    Descending source higher risk for re-bleeding.    Significant blood product in colon still-->this will likely continue to pass.    If she has overt bleeding in conjunction with ongoing hemoglobin drop should consult IR. Descending colon site more likely to be source of bleeding      Will follow with you

## 2024-01-02 NOTE — PROGRESS NOTES
[facility]    Medicine Progress Note - Hospitalist Service    Date of Admission:  12/31/2023    Assessment & Plan     Jessica Reynolds is a 78 year old female admitted on 12/31/2023. She has history of urothelial cancer status post resection in early 2023, COPD, HTN, HLD, previous heavy alcohol use here for BRBPR     1. Painless bright red blood per rectum  -Acute blood loss anemia  -multiple episodes of bright red blood per rectum since admission  - Hb 12.6 -> 11.0-->9-->now this am 6.4  -CT showing prominent intramural vascularity of entire colon concerning for angiodysplasia, colonic diverticulosis  -not on any antiplatelets or anticoagulation (naproxen on med list, but not taken recently)  -Seen by GI, appreciate recommendations  -NPO   -Monitor Hb q 6  -Transfuse if Hb < 7--stat 2 units p RBC now before colonoscopy --I got consent urgently this am  -I told multiple staff members she needs 2 large bore PIV  -tele     2.Mild J LUIS  -Cr baseline 0.86, here 0.92  -IV hydration, Monitor Cr     3.COPD  -On room air  -Home inhalers     4.History of urothelial cancer  -Sees [facility]  -Status post resection of tumor a few months ago  -She states has followup scheduled in April 5. Prediabetes  HbA1c 5.7     6.Mild thrombocytopenia  Not on chemical DVT prophylaxis  Monitor PC--119 today      7.Mood, home Zoloft, Remeron    8.Hypothyroid, home Synthroid    9.Tremor, home primidone    10.HLD, home statin    11.Hypertension, holding med, lower bp now     12.Incidental findings  - Cholelithiasis with multiple tiny gallstones, no cholecystitis nor bile duct dilation  - Multiple pancreatic calcifications compatible with chronic pancreatitis  - Supraumbilical hernia, no obstruction            Diet: NPO per Anesthesia Guidelines for Procedure/Surgery Except for: Meds    DVT Prophylaxis: Pneumatic Compression Devices  Torres Catheter: Not present  Lines: None     Cardiac Monitoring: ACTIVE  order. Indication: gib  Code Status: No CPR- Do NOT Intubate      Clinically Significant Risk Factors          # Hypocalcemia: Lowest Ca = 7.9 mg/dL in last 2 days, will monitor and replace as appropriate       # Thrombocytopenia: Lowest platelets = 119 in last 2 days, will monitor for bleeding   # Hypertension: Noted on problem list                   Disposition Plan      Expected Discharge Date: 01/02/2024        Discharge Comments: Pending results of colonoscopy            Agnieszka Landrum MD  Hospitalist Service  [facility]  Securely message with [employer] (more info)  Text page via [employer] Paging/Directory   ______________________________________________________________________    Interval History   I went to see her in pre-op area  I got consent for blood  She did overnight colon prep and had bloody stool  No abd pain      Physical Exam   Vital Signs: Temp: 98  F (36.7  C) Temp src: Oral BP: 98/50 Pulse: 87   Resp: 18 SpO2: 96 % O2 Device: None (Room air)    Weight: 121 lbs 4.05 oz    Constitutional: awake, alert, cooperative, and no apparent distress  Respiratory: No increased work of breathing, good air exchange, clear to auscultation bilaterally, no crackles or wheezing  Cardiovascular: regular rate and rhythm and normal S1 and S2  GI: normal bowel sounds, soft, non-distended, and non-tender  Skin: no bruising or bleeding  Musculoskeletal: no lower extremity pitting edema present  Neurologic: Mental Status Exam:  Level of Alertness:   awake  Neuropsychiatric: General: normal, calm, and normal eye contact    Medical Decision Making       55 MINUTES SPENT BY ME on the date of service doing chart review, history, exam, documentation & further activities per the note.      Data     I have personally reviewed the following data over the past 24 hrs:    5.5  \   6.4 (LL)   / 119 (L)     145 112 (H) 11.2 /  94   3.6 24 0.92 \       Imaging results reviewed over the past 24 hrs:   No results  found for this or any previous visit (from the past 24 hour(s)).

## 2024-01-02 NOTE — PROGRESS NOTES
Called by RN bloody stool  Has been NPO  IVF  Hgb q 4 hours  S/p 2 units pRBC today  Last hgb 8.4 at noon    Plan keep npo  IR consult now--paged --will take her for angio     Had cta yesterday    I updated pt and daughter in room  I talked and updated  from GI

## 2024-01-02 NOTE — SEDATION DOCUMENTATION
Pt. With an episode of bright red bloody stool with clots and Regular, non bloody emesis . BP decreased to 90s/40's. Dr. Landrum notified. Hgb drawn and Dr. Landrum ordering another unit of PRBC.

## 2024-01-02 NOTE — PROGRESS NOTES
Called by IR staff    More bloody stool  Hgb sent-pending    I have ordered another unit of pRBC to be given stat now(this will be her third unit today)    Waiting to go in to IR procedure for angio    Continue tele, q 4 hour hgb checks

## 2024-01-02 NOTE — ANESTHESIA POSTPROCEDURE EVALUATION
Patient: Jessica Reynolds    Procedure: Procedure(s):  COLONOSCOPY WITH HEMOSTATIS       Anesthesia Type:  MAC    Note:  Disposition: Outpatient   Postop Pain Control: Uneventful            Sign Out: Well controlled pain   PONV: No   Neuro/Psych: Uneventful            Sign Out: Acceptable/Baseline neuro status   Airway/Respiratory: Uneventful            Sign Out: Acceptable/Baseline resp. status   CV/Hemodynamics: Uneventful            Sign Out: Acceptable CV status; No obvious hypovolemia; No obvious fluid overload   Other NRE: NONE   DID A NON-ROUTINE EVENT OCCUR? No    Event details/Postop Comments:  Patient recovering comfortably.        Last vitals:  Vitals:    01/02/24 1239 01/02/24 1310 01/02/24 1337   BP: 125/74 123/57 100/58   Pulse: 77 79 74   Resp: 18 18 18   Temp: 36.4  C (97.5  F)  36.6  C (97.9  F)   SpO2: 100% 99% 99%       Electronically Signed By: Jose Sales DO  January 2, 2024  2:07 PM

## 2024-01-02 NOTE — PRE-PROCEDURE
Pre-procedure Note    Reason for procedure: BRBPR    History and Physical Reviewed: Reviewed, no changes.    Pre-sedation assessment: adequate for procedure as stated    General: alert, appears stated age, and cooperative  Airway: normal  Heart: regular rate and rhythm  Lungs: clear to auscultation bilaterally    Sedation Plan based on assessment: MAC    Mallampati score: Class III (visualization of the soft palate and base of uvula)          ASA Classification: ASA 4 - Patient with severe systemic disease that is a constant threat to life    Impression: Patient deemed adequate candidate for MAC sedation    Risks, benefits and alternatives were discussed with the patient and informed consent was obtained.    Plan: colonoscopy      Michael Curiel MD 1/2/2024 10:29 AM                                            Michael Curiel MD  Thank you for the opportunity to participate in the care of this patient.   Please feel free to call me with any questions or concerns.  Phone number [phone].

## 2024-01-02 NOTE — PRE-PROCEDURE
GENERAL PRE-PROCEDURE:   Procedure:  Visceral angiogram  Date/Time:  1/2/2024 2:41 PM    Written consent obtained?: Yes    Risks and benefits: Risks, benefits and alternatives were discussed    Consent given by:  Patient  Patient states understanding of procedure being performed: Yes    Patient's understanding of procedure matches consent: Yes    Procedure consent matches procedure scheduled: Yes    Expected level of sedation:  Moderate  Appropriately NPO:  Yes  ASA Class:  3  Mallampati  :  Grade 2- soft palate, base of uvula, tonsillar pillars, and portion of posterior pharyngeal wall visible  Lungs:  Lungs clear with good breath sounds bilaterally  Heart:  Normal heart sounds and rate  History & Physical reviewed:  History and physical reviewed and no updates needed  Statement of review:  I have reviewed the lab findings, diagnostic data, medications, and the plan for sedation

## 2024-01-02 NOTE — CONSULTS
Interventional Radiology - Consultation Note:  Inpatient - [facility]  01/02/2024     Reason for Consult:  79 y/o with recurrent gib, colon done today with clip, more bleeding    Requesting Provider:  Agnieszka Landrum MD     HPI:  Jessica Reynolds is a 78 year old female with history of urothelial cancer status post resection in early 2023, COPD, HTN, HLD, previous heavy alcohol. Admitted 12/31/23 for bright red blood per rectum s/p colonoscopy 1/2/2024 which demonstrated hemorrhagic mucosa in the transverse colon and diverticula in the descending colon with active   Hemorrhage s/p clipping in both locations. Post colonoscopy patient with ongoing bleeding. Hgb this AM at 6.4 and at noon was 8.4. Patient with labile Bps, not presently on pressors.    IMAGING:    CTA abd/pelvis 12/31/23:  FINDINGS:  ANGIOGRAM ABDOMEN/PELVIS: No evidence of active GI bleed. Prominent intramural vascularity involving segments of the ascending, transverse and descending colon with early draining veins, suggesting angiodysplasia.     The celiac, SMA and single left renal artery are widely patent. Moderate origin stenoses of single right renal artery and ELOISA. Moderate aortoiliac atherosclerosis without aneurysm.     LOWER CHEST: Heavy mitral annular calcifications.     HEPATOBILIARY: Cholelithiasis with multiple tiny gallstones. No cholecystitis nor bile duct dilatation. Negative liver.     PANCREAS: Multiple parenchymal and intraductal stones region of pancreatic head redemonstrated. Largest stone within the distal pancreatic duct measures 5 x 10 mm coronal image 43 series 11. Pancreatic duct is nondilated with no peripancreatic   inflammatory change.     SPLEEN: Normal.     ADRENAL GLANDS: Normal.     KIDNEYS/BLADDER: Mild left renal cortical atrophy. No urinary tract calculus nor hydronephrosis. Mild bladder mucosal hyperenhancement with perivesicular edema/inflammation.     BOWEL: No evidence of active  GI bleed Diverticulosis of the colon. No acute inflammatory change. No obstruction.. Previous appendectomy.     LYMPH NODES: No lymphadenopathy.     PELVIC ORGANS: Hysterectomy.     MUSCULOSKELETAL: Interval increase in right paramidline supraumbilical ventral hernia which now contains mesenteric fat and short segment of sigmoid colon without obstruction. No suspicious osseous lesions.                                                                      IMPRESSION:  1.  No evidence of active GI bleed.  2.  Prominent intramural vascularity with early draining veins involving segments of the ascending, transverse and descending colon, suggesting angiodysplasia.  3.  Colonic diverticulosis with no acute inflammatory bowel process nor evidence of obstruction.  4.  Cholelithiasis with multiple tiny gallstones. No cholecystitis nor bile duct dilatation.  5.  Multiple parenchymal and intraductal pancreatic calcifications compatible with sequela from chronic pancreatitis.  6.  Interval enlargement of right paramidline supraumbilical hernia which now contains mesenteric fat and a short segment of transverse colon without obstruction.    NPO Status:  midnight.  Anticoagulation/Antiplatelets/Bleeding tendencies:  none  Antibiotics:  none    REVIEW OF SYSTEMS:  A comprehensive 10-point review of systems was performed. All systems were reviewed and negative with exception to those reported in the HPI.     PAST MEDICAL HISTORY:  Past Medical History:   Diagnosis Date    Anxiety     Arthritis     Bladder mass     Carotid stenosis     right    COPD (chronic obstructive pulmonary disease) (H)     Essential tremor     Hepatic steatosis     History of rectal cancer     Hypertension     Macular degeneration (senile) of retina     Osteoporosis     Rosacea     Thyroid disease     Vitamin D deficiency        PAST SURGICAL HISTORY:  Past Surgical History:   Procedure Laterality Date    APPENDECTOMY      COMBINED CYSTOSCOPY, RETROGRADES,  EXCHANGE STENT URETER(S) Left 3/8/2023    Procedure: CYSTOSCOPY WITH LEFT RETROGRADE PYELOGRAM LEFT URETEROSCOPY, BRUSH AND REGULAR BIOPSY LEFT URETER AND LEFT URETERAL STENT PLACEMENT;  Surgeon: Suresh Samuels MD;  Location: [facility] OR    EYE SURGERY         ALLERGIES:  Allergies   Allergen Reactions    Bactrim [Sulfamethoxazole-Trimethoprim] Nausea and Vomiting and Diarrhea     Critically high      Sudafed [Pseudoephedrine] Dizziness     Light headed        MEDICATIONS:  Current Facility-Administered Medications   Medication    acetaminophen (TYLENOL) tablet 650 mg    Or    acetaminophen (TYLENOL) Suppository 650 mg    albuterol (PROVENTIL HFA/VENTOLIN HFA) inhaler    albuterol (PROVENTIL) neb solution 2.5 mg    atorvastatin (LIPITOR) tablet 80 mg    flumazenil (ROMAZICON) injection 0.2 mg    lactated ringers infusion    levothyroxine (SYNTHROID/LEVOTHROID) tablet 75 mcg    lidocaine (LMX4) cream    lidocaine 1 % 0.1-1 mL    melatonin tablet 1 mg    mirtazapine (REMERON) tablet 15 mg    mometasone-formoterol (DULERA) 200-5 MCG/ACT oral inhaler 2 puff    naloxone (NARCAN) injection 0.2 mg    naloxone (NARCAN) injection 0.2 mg    naloxone (NARCAN) injection 0.4 mg    naloxone (NARCAN) injection 0.4 mg    nicotine (NICORETTE) gum 2 mg    oxyCODONE (ROXICODONE) tablet 10 mg    oxyCODONE (ROXICODONE) tablet 5 mg    pantoprazole (PROTONIX) IV push injection 40 mg    primidone (MYSOLINE) tablet 100 mg    prochlorperazine (COMPAZINE) injection 5 mg    senna-docusate (SENOKOT-S/PERICOLACE) 8.6-50 MG per tablet 1 tablet    Or    senna-docusate (SENOKOT-S/PERICOLACE) 8.6-50 MG per tablet 2 tablet    sertraline (ZOLOFT) tablet 50 mg    sodium chloride (PF) 0.9% PF flush 3 mL    sodium chloride (PF) 0.9% PF flush 3 mL        LABS:  INR   Date Value Ref Range Status   12/31/2023 1.10 0.85 - 1.15 Final      Hemoglobin   Date Value Ref Range Status   01/02/2024 8.4 (L) 11.7 - 15.7 g/dL Final     Platelet Count   Date  "Value Ref Range Status   01/02/2024 119 (L) 150 - 450 10e3/uL Final     Creatinine   Date Value Ref Range Status   01/02/2024 0.92 0.51 - 0.95 mg/dL Final     Potassium   Date Value Ref Range Status   01/02/2024 3.6 3.4 - 5.3 mmol/L Final   02/11/2022 3.9 3.5 - 5.0 mmol/L Final         EXAM:  /58 (BP Location: Left arm)   Pulse 74   Temp 97.9  F (36.6  C) (Oral)   Resp 18   Ht 1.575 m (5' 2\")   Wt 55 kg (121 lb 4.1 oz)   SpO2 99%   BMI 22.18 kg/m    General:  Stable.  In no acute distress.    Neuro:  A&O x 3. Moves all extremities equally.  Resp:  Lungs clear to auscultation bilaterally.  Cardio:  S1S2 and reg, without murmur, clicks or rubs  Abdomen:  Soft, non-distended, non-tender    Pre-Sedation Assessment:  Mallampati Airway Classification:  II - Faucial pillars and soft palate may be seen, but uvula is masked by the base of the tongue  Previous reaction to anesthesia/sedation:  No  Sedation plan based on assessment: Moderate (conscious) sedation  ASA Classification: Class 2 - MILD SYSTEMIC DISEASE, NO ACUTE PROBLEMS, NO FUNCTIONAL LIMITATIONS.   Code Status/Advanced care directive: FULL CODE for procedure.    ASSESSMENT:  Ongoing GI bleed despite transverse and descending colon clipping today.    PLAN:    Case and imaging reviewed between Dr Nicole and Dr. Landrum.     Recommend proceeding with IR visceral angiogram with possible intervention and with sedation.    Recommendations and procedural education reviewed with patient in detail including, but not limited to risks, benefits and alternatives with understanding verbalized by patient.    Thank you for kindly for this consultation.     Total time spent on the date of the encounter: 45 minutes.      Debby Baron PA-C  Interventional Radiology  [phone]        "

## 2024-01-02 NOTE — SEDATION DOCUMENTATION
Patient Name: Jessica Reynolds  Medical Record Number: [id number]  Today's Date: 1/2/2024    Procedure: visceral angiogram  Proceduralist: Dr. Che    Procedure Start: 17:17  Procedure end: 17:49  Sedation medications administered: 1 mg midazolam and 25 mcg fentanyl   Sedation time: 32 minutes    Report given to: Jose CRAIG RN P2    Other Notes: Pt arrived to IR room 1 from IR holding. Consent reviewed. Pt denies any questions or concerns regarding procedure. Pt positioned supine and monitored per protocol. Pt tolerated procedure without any noted complications. VSS. Pt transferred back to P2.

## 2024-01-02 NOTE — PLAN OF CARE
"  Problem: Adult Inpatient Plan of Care  Goal: Plan of Care Review  Description: The Plan of Care Review/Shift note should be completed every shift.  The Outcome Evaluation is a brief statement about your assessment that the patient is improving, declining, or no change.  This information will be displayed automatically on your shift  note.  Outcome: Progressing  Goal: Patient-Specific Goal (Individualized)  Description: You can add care plan individualizations to a care plan. Examples of Individualization might be:  \"Parent requests to be called daily at 9am for status\", \"I have a hard time hearing out of my right ear\", or \"Do not touch me to wake me up as it startles  me\".  Outcome: Progressing  Goal: Absence of Hospital-Acquired Illness or Injury  Outcome: Progressing  Intervention: Identify and Manage Fall Risk  Recent Flowsheet Documentation  Taken 1/2/2024 1300 by Allan Rubi RN  Safety Promotion/Fall Prevention:   clutter free environment maintained   lighting adjusted   room organization consistent  Intervention: Prevent Skin Injury  Recent Flowsheet Documentation  Taken 1/2/2024 1300 by Allan Rubi RN  Body Position: position changed independently  Intervention: Prevent and Manage VTE (Venous Thromboembolism) Risk  Recent Flowsheet Documentation  Taken 1/2/2024 1300 by Allan Rubi RN  VTE Prevention/Management: SCDs (sequential compression devices) off  Goal: Optimal Comfort and Wellbeing  Outcome: Progressing  Goal: Readiness for Transition of Care  Outcome: Progressing     Problem: Risk for Delirium  Goal: Optimal Coping  Outcome: Progressing  Goal: Improved Behavioral Control  Outcome: Progressing  Intervention: Minimize Safety Risk  Recent Flowsheet Documentation  Taken 1/2/2024 1300 by Allan Rubi RN  Enhanced Safety Measures: room near unit station  Goal: Improved Attention and Thought Clarity  Outcome: Progressing  Goal: Improved Sleep  Outcome: Progressing     Problem: " Anemia  Goal: Anemia Symptom Improvement  Outcome: Progressing  Intervention: Monitor and Manage Anemia  Recent Flowsheet Documentation  Taken 1/2/2024 1300 by Allan Rubi, RN  Safety Promotion/Fall Prevention:   clutter free environment maintained   lighting adjusted   room organization consistent  Fatigue Management:   activity assistance provided   paced activity encouraged     Problem: Gastrointestinal Bleeding  Goal: Optimal Coping with Acute Illness  Outcome: Progressing  Goal: Hemostasis  Outcome: Progressing   Goal Outcome Evaluation:                  Pt is alert and oriented x4. Pt's v/s is stable. Pt denies pain. Pt had 1 bloody stool after she came back from her colonoscopy. Md is aware. Pt's iv's patent. Continue to monitor pt.

## 2024-01-02 NOTE — ANESTHESIA CARE TRANSFER NOTE
Patient: Jessica Reynolds    Procedure: Procedure(s):  COLONOSCOPY WITH HEMOSTATIS       Diagnosis: BRBPR (bright red blood per rectum) [K62.5]  Diagnosis Additional Information: No value filed.    Anesthesia Type:   MAC     Note:    Oropharynx: oropharynx clear of all foreign objects and spontaneously breathing  Level of Consciousness: drowsy  Oxygen Supplementation: nasal cannula  Level of Supplemental Oxygen (L/min / FiO2): 2  Independent Airway: airway patency satisfactory and stable  Dentition: dentition unchanged  Vital Signs Stable: post-procedure vital signs reviewed and stable  Report to RN Given: handoff report given  Patient transferred to: Medical/Surgical Unit    Handoff Report: Identifed the Patient, Identified the Reponsible Provider, Reviewed the pertinent medical history, Discussed the surgical course, Reviewed Intra-OP anesthesia mangement and issues during anesthesia, Set expectations for post-procedure period and Allowed opportunity for questions and acknowledgement of understanding      Vitals:  Vitals Value Taken Time   /55    Temp 97.6    Pulse 78    Resp     SpO2 100        Electronically Signed By: PRANAV Espitia CRNA  January 2, 2024  11:50 AM

## 2024-01-02 NOTE — ANESTHESIA PREPROCEDURE EVALUATION
Anesthesia Pre-Procedure Evaluation    Patient: Jessica Reynolds   MRN: [id number] : 1945        Procedure : Procedure(s):  COLONOSCOPY          Past Medical History:   Diagnosis Date     Anxiety      Arthritis      Bladder mass      Carotid stenosis     right     COPD (chronic obstructive pulmonary disease) (H)      Essential tremor      Hepatic steatosis      History of rectal cancer      Hypertension      Macular degeneration (senile) of retina      Osteoporosis      Rosacea      Thyroid disease      Vitamin D deficiency       Past Surgical History:   Procedure Laterality Date     APPENDECTOMY       COMBINED CYSTOSCOPY, RETROGRADES, EXCHANGE STENT URETER(S) Left 3/8/2023    Procedure: CYSTOSCOPY WITH LEFT RETROGRADE PYELOGRAM LEFT URETEROSCOPY, BRUSH AND REGULAR BIOPSY LEFT URETER AND LEFT URETERAL STENT PLACEMENT;  Surgeon: Suresh Samuels MD;  [place] [facility] [place]     EYE SURGERY        Allergies   Allergen Reactions     Bactrim [Sulfamethoxazole-Trimethoprim] Nausea and Vomiting and Diarrhea     Critically high       Sudafed [Pseudoephedrine] Dizziness     Light headed      Social History     Tobacco Use     Smoking status: Every Day     Packs/day: .5     Types: Cigarettes     Smokeless tobacco: Not on file     Tobacco comments:     4 cigarettes in the past 2 weeks   Substance Use Topics     Alcohol use: Yes     Alcohol/week: 2.0 standard drinks of alcohol     Types: 2 Standard drinks or equivalent per week     Comment: N/A beer every other day, zaid 3-5 drinks er week      Wt Readings from Last 1 Encounters:   23 55 kg (121 lb 4.1 oz)        Anesthesia Evaluation            ROS/MED HX  ENT/Pulmonary:     (+)                          COPD,              Neurologic:       Cardiovascular:     (+)  hypertension- -   -  - -                                      METS/Exercise Tolerance:     Hematologic:       Musculoskeletal:   (+)  arthritis,             GI/Hepatic:      "  Renal/Genitourinary:     (+) renal disease, type: CRI,            Endo:     (+)          thyroid problem, hypothyroidism,           Psychiatric/Substance Use:       Infectious Disease:       Malignancy:   (+) Malignancy, History of GI.    Other:               OUTSIDE LABS:  CBC:   Lab Results   Component Value Date    WBC 6.6 01/01/2024    WBC 7.7 12/31/2023    HGB 9.0 (L) 01/01/2024    HGB 11.0 (L) 01/01/2024    HCT 31.7 (L) 01/01/2024    HCT 36.9 12/31/2023     (L) 01/01/2024     12/31/2023     BMP:   Lab Results   Component Value Date     01/01/2024     12/31/2023    POTASSIUM 4.2 01/01/2024    POTASSIUM 4.1 12/31/2023    CHLORIDE 109 (H) 01/01/2024    CHLORIDE 101 12/31/2023    CO2 24 01/01/2024    CO2 26 12/31/2023    BUN 14.0 01/01/2024    BUN 11.5 12/31/2023    CR 0.95 01/01/2024    CR 0.96 (H) 12/31/2023     (H) 01/01/2024     (H) 12/31/2023     COAGS:   Lab Results   Component Value Date    PTT 32 12/31/2023    INR 1.10 12/31/2023     POC: No results found for: \"BGM\", \"HCG\", \"HCGS\"  HEPATIC:   Lab Results   Component Value Date    ALBUMIN 3.8 12/31/2023    PROTTOTAL 6.4 12/31/2023    ALT 21 12/31/2023    AST 34 12/31/2023    ALKPHOS 89 12/31/2023    BILITOTAL 0.6 12/31/2023     OTHER:   Lab Results   Component Value Date    A1C 5.7 (H) 12/31/2023    JERI 8.9 01/01/2024    TSH 3.74 06/20/2023       Anesthesia Plan    ASA Status:  4       Anesthesia Type: MAC.              Consents    Anesthesia Plan(s) and associated risks, benefits, and realistic alternatives discussed. Questions answered and patient/representative(s) expressed understanding.     - Discussed: Risks, Benefits and Alternatives for BOTH SEDATION and the PROCEDURE were discussed     - Discussed with:  Patient      - Extended Intubation/Ventilatory Support Discussed: No.      - Patient is DNR/DNI Status: No     Use of blood products discussed: Yes.     - Discussed with: Patient.     - Consented: consented " to blood products     Postoperative Care    Pain management: IV analgesics, Oral pain medications.   PONV prophylaxis: Ondansetron (or other 5HT-3), Dexamethasone or Solumedrol     Comments:    Other Comments: Patient to receive 1u pRBC prior to procedure due to hemoglobin of 6.4.          Jose Sales, DO    I have reviewed the pertinent notes and labs in the chart from the past 30 days and (re)examined the patient.  Any updates or changes from those notes are reflected in this note.

## 2024-01-03 NOTE — PLAN OF CARE
Goal Outcome Evaluation:    Problem: Adult Inpatient Plan of Care  Goal: Optimal Comfort and Wellbeing  Outcome: Progressing     Problem: Risk for Delirium  Goal: Improved Sleep  Outcome: Progressing        Pt AO. Denies pain. VSS on RA. CMS intact and puncture site looks good. Pt had a small bloody BM followed by a large roughly 500ml bloody BM. House paged to check pt. 1 unit PRBC started at 2145, VSS, pt comfortable and tolerating. Sleeping between cares.

## 2024-01-03 NOTE — PROGRESS NOTES
"GASTROENTEROLOGY PROGRESS NOTE     SUBJECTIVE: receiving  her 5th unit of PRBC since admission 2023. Continues to have bright red blood per rectum. Surgery has now seen the patient. Plans to work with GI to localized  the source of bleeding.      Patient underwent colonoscopy on  notable for two areas of potential bleeding in the transverse and descending colon. Both areas with clips placed and also notable for significant retained blood product in the colon. Patient with continued bleeding, IR consulted and patient underwent a mesenteric angiogram with no identified site of bleeding.         OBJECTIVE:   /62 (BP Location: Left arm)   Pulse 86   Temp 97.9  F (36.6  C) (Oral)   Resp 18   Ht 1.575 m (5' 2\")   Wt 55 kg (121 lb 4.1 oz)   SpO2 98%   BMI 22.18 kg/m     Temp (24hrs), Av  F (36.7  C), Min:97.4  F (36.3  C), Max:99.4  F (37.4  C)     Patient Vitals for the past 72 hrs:   Weight   23 1823 55 kg (121 lb 4.1 oz)   23 1311 55.8 kg (123 lb)        Intake/Output Summary (Last 24 hours) at 1/3/2024 1016  Last data filed at 1/3/2024 0900  Gross per 24 hour   Intake 1975 ml   Output 600 ml   Net 1375 ml      PHYSICAL EXAM   Constitutional: Ill appearing female , in bed, no acute distress  Respiratory:  respirations non labored.   Abdomen: Soft, non-tender, non-distended  I have reviewed the patient's new clinical lab results:   Recent Labs   Lab Test 24  0803 24  0208 24  2107 24  1205 24  0624 24  1742 24  0657 23  2359 23  1325   WBC 12.3*  --   --   --  5.5  --  6.6  --  7.7   HGB 7.8* 8.7* 7.5*   < > 6.4*   < > 11.0*   < > 12.6   MCV 88  --   --   --  98  --  94  --  97   PLT 89*  --   --   --  119*  --  143*  --  166   INR  --   --   --   --   --   --   --   --  1.10    < > = values in this interval not displayed.      Recent Labs   Lab Test 24  0710 24  0624 24  0657    145 140   POTASSIUM 4.2 3.6 " 4.2   CHLORIDE 112* 112* 109*   CO2 19* 24 24   BUN 14.5 11.2 14.0   CR 0.96* 0.92 0.95   ANIONGAP 11 9 7   JERI 7.4* 7.9* 8.9      Recent Labs   Lab Test 12/31/23  1325 02/20/23  0859 01/29/23  1427   ALBUMIN 3.8 4.2 3.8   BILITOTAL 0.6 0.6 0.5   ALT 21 30 31   AST 34 55* 57*   ALKPHOS 89 119* 109*      Assessment:  78 year old female with a history of right hemicolectomy in her 40's for colon bleeding who presents with lower GI bleeding found in possibly two areas of the colon transverse colon ? Friable mucosa versus dieulafoy clips x 3, descending colon clips x 2 likely diverticuli. Ongoing bleeding with negative angiography now has been seen by surgery with considerations of combined colonoscopy with surgery in the OR.   Plan:    Follow HGB transfuse PRN  GI Dr. Michael Curiel will work with the surgery team.   Approximately 40 minutes of total time was spent providing patient care, including patient evaluation, reviewing documentation/test result, and .  Kayla Johansen CNP  [facility]   Office

## 2024-01-03 NOTE — CONSULTS
General Surgery Consultation  Jessica Reynolds MRN# [id number]   Age/Sex: 78 year old female YOB: 1945     Reason for consult: 1. BRBPR (bright red blood per rectum)            Requesting physician: Dr. Agnieszka Landrum                    Assessment and Plan:   Assessment:  Lower GI bleeding    Ms. Reynolds is a 78F admitted with painless bright red blood per rectum on 12/31/23. Afebrile and vitals stable. Labs notable for significant anemia, last hemoglobin 7.8 and s/p 4 units of pRBC's. Patient underwent colonoscopy on 1/2 notable for two areas of potential bleeding in the transverse and descending colon. Both areas with clips placed and also notable for significant retained blood product in the colon. Patient with continued bleeding, IR consulted and patient underwent a mesenteric angiogram with no identified site of bleeding. Patient with continued bleeding and hemoglobin down trending and requiring blood products. Will continue to monitor bleeding and hemoglobin for the next 24 hours. Pending clinical course, will tentatively plan for combination case with GI to assess site of bleeding further and manage endoscopically and/or surgically as necessary.     Plan:  - NPO  - Trend hemoglobin and transfuse per primary team  - Monitor hemodynamics, support as necessary   - Tentative plan for endoscopic re-evaluation tomorrow with GI and possible surgical intervention pending findings  - Case reviewed and discussed with Dr. Jenkins. General surgery will continue to follow            Chief Complaint:     Chief Complaint   Patient presents with    Rectal Bleeding        History is obtained from the patient and electronic health record    HPI:   Jessica Reynolds is a 78 year old female with PMH of urothelial cancer s/p laparoscopic ARNEL and robot-assisted laparoscopic left distal ureterectomy with bladder cuff and ureteroneocystotomy of the left ureter on 4/14/23, HTN, COPD  who presented to the [facility]  Emergency Department with painless bright red blood per rectum since 12/31. Patient reports she started passing large volumes of bright red blood per rectum with scant flecks of stool and clots. She had minimal associated abdominal discomfort. Given ongoing bleeding, she presented to the ED for further evaluation. Currently, she endorses intermittent epigastric abdominal discomfort, fatigue and weakness. Reports nausea and vomiting yesterday, now resolved. Denies fever, chills, lightheadedness, dizziness, hematemesis, melena. Reports her last BM was early this morning and was primarily blood. Per RN report, still red but not bright red as previous BM. Patient reports history of BRBPR when she was in her 40's and states that at that time it was due to a large vein in her colon and she ultimately required exploratory surgery and resection. She has not had bleeding issues since that time. Her last colonoscopy prior to admission was ~2 years ago and was reportedly normal. Her past surgical history includes a tubal ligation.           Past Medical History:     Past Medical History:   Diagnosis Date    Anxiety     Arthritis     Bladder mass     Carotid stenosis     right    COPD (chronic obstructive pulmonary disease) (H)     Essential tremor     Hepatic steatosis     History of rectal cancer     Hypertension     Macular degeneration (senile) of retina     Osteoporosis     Rosacea     Thyroid disease     Vitamin D deficiency               Past Surgical History:     Past Surgical History:   Procedure Laterality Date    APPENDECTOMY      COLONOSCOPY N/A 1/2/2024    Procedure: COLONOSCOPY WITH HEMOSTASIS;  Surgeon: Michael Curiel MD;  Location: [facility]    COMBINED CYSTOSCOPY, RETROGRADES, EXCHANGE STENT URETER(S) Left 3/8/2023    Procedure: CYSTOSCOPY WITH LEFT RETROGRADE PYELOGRAM LEFT URETEROSCOPY, BRUSH AND REGULAR BIOPSY LEFT URETER AND LEFT URETERAL STENT PLACEMENT;  Surgeon: Suresh Samuels MD;  Location:   [facility] [place] OR    EYE SURGERY      IR VISCERAL ANGIOGRAM  1/2/2024             Social History:    reports that she has been smoking cigarettes. She has been smoking an average of 0.5 packs per day. She does not have any smokeless tobacco history on file. She reports current alcohol use of about 2.0 standard drinks of alcohol per week. She reports that she does not use drugs.           Family History:     Family History   Problem Relation Age of Onset    Cerebrovascular Disease Mother     Heart Disease Father     Cerebrovascular Disease Father     Lung Cancer Sister     Diabetes Maternal Grandmother     Substance Abuse Sister               Allergies:     Allergies   Allergen Reactions    Bactrim [Sulfamethoxazole-Trimethoprim] Nausea and Vomiting and Diarrhea     Critically high      Sudafed [Pseudoephedrine] Dizziness     Light headed              Medications:     Prior to Admission medications    Medication Sig Start Date End Date Taking? Authorizing Provider   albuterol (PROAIR HFA) 90 mcg/actuation inhaler Inhale 1-2 puffs into the lungs 3 times daily 4/3/19  Yes Provider, Historical   albuterol (PROVENTIL) (2.5 MG/3ML) 0.083% neb solution Take 2.5 mg by nebulization every 6 hours as needed for shortness of breath, wheezing or cough   Yes Reported, Patient   amLODIPine-benazepril (LOTREL) 10-20 mg per capsule Take 1 capsule by mouth daily 6/28/16  Yes Provider, Historical   atorvastatin (LIPITOR) 80 MG tablet Take 80 mg by mouth at bedtime 12/16/16  Yes Provider, Historical   CHOLECALCIFEROL, VITAMIN D3, ORAL Take 2,000 Units by mouth daily 4/3/19  Yes Provider, Historical   mirtazapine (REMERON) 15 MG tablet Take 15 mg by mouth at bedtime 11/26/18  Yes Provider, Historical   mometasone-formoterol (DULERA) 200-5 mcg/actuation HFAA inhaler Inhale 2 puffs into the lungs at bedtime 4/3/19  Yes Provider, Historical   Multiple Vitamins-Minerals (PRESERVISION AREDS PO) Take 2 tablets by mouth daily   Yes Reported,  Patient   naproxen (NAPROSYN) 250 MG tablet Take 1 tablet (250 mg) by mouth every 12 hours as needed for other (Inflammatory pain) 3/8/23  Yes Suresh Samuels MD   ujtaj-4-bsx-epa-dpa-fish oil (OMEGA-3 2100) 1,050-1,200 mg cap Take 1 capsule by mouth daily 4/3/19  Yes Provider, Historical   predniSONE (DELTASONE) 10 MG tablet Take 10 mg by mouth as needed 1-2 tabs orally once a day as needed for COPD flare up for 7 days.   Yes Reported, Patient   primidone (MYSOLINE) 50 MG tablet Take 100 mg by mouth 2 times daily   Yes Reported, Patient   sertraline (ZOLOFT) 50 MG tablet Take 50 mg by mouth daily 7/11/23  Yes Reported, Patient   SYNTHROID 75 MCG tablet Take 75 mcg by mouth daily 8/2/23  Yes Reported, Patient   vitamin C-biotin (HAIR-SKIN-NAILS, VIT C-BIOTIN,) 50 mg -1,250 mcg Chew Take 1 capsule by mouth daily 4/3/19  Yes Provider, Historical              Review of Systems:   The Review of Systems is negative other than noted in the HPI            Physical Exam:   Patient Vitals for the past 24 hrs:   BP Temp Temp src Pulse Resp SpO2   01/03/24 0734 132/62 97.9  F (36.6  C) Oral 86 18 98 %   01/03/24 0305 111/55 98.2  F (36.8  C) Oral 84 16 100 %   01/02/24 2345 132/68 97.9  F (36.6  C) Oral 81 20 98 %   01/02/24 2317 131/63 97.8  F (36.6  C) Oral 79 24 98 %   01/02/24 2250 127/60 97.7  F (36.5  C) Oral 79 18 97 %   01/02/24 2205 100/58 97.4  F (36.3  C) Oral 89 18 97 %   01/02/24 2143 101/54 97.6  F (36.4  C) Oral 83 16 96 %   01/02/24 2120 104/56 -- -- 90 20 --   01/02/24 2028 117/55 -- -- -- -- --   01/02/24 1950 132/63 98  F (36.7  C) Oral 85 20 --   01/02/24 1920 136/64 98  F (36.7  C) Oral 76 20 97 %   01/02/24 1905 (!) 141/71 98.1  F (36.7  C) Oral 72 18 97 %   01/02/24 1840 (!) 146/65 98.3  F (36.8  C) Oral 73 20 97 %   01/02/24 1824 (!) 142/64 97.9  F (36.6  C) Oral 76 18 97 %   01/02/24 1755 133/63 -- -- 76 (!) 62 96 %   01/02/24 1750 -- -- -- -- 18 --   01/02/24 1750 (!) 143/65 -- -- 77 (!) 54 95 %    01/02/24 1745 135/62 -- -- 77 (!) 43 95 %   01/02/24 1740 131/59 -- -- 78 (!) 68 96 %   01/02/24 1735 126/57 -- -- 78 (!) 62 97 %   01/02/24 1730 124/59 -- -- 79 (!) 63 96 %   01/02/24 1728 -- 99.3  F (37.4  C) Tympanic -- -- --   01/02/24 1725 108/53 -- -- 80 (!) 57 96 %   01/02/24 1720 97/50 -- -- 82 (!) 46 96 %   01/02/24 1715 113/54 -- -- 80 20 96 %   01/02/24 1711 -- 99.4  F (37.4  C) Temporal -- -- --   01/02/24 1600 127/58 -- -- 92 17 97 %   01/02/24 1540 128/64 -- -- -- 18 98 %   01/02/24 1414 131/60 97.8  F (36.6  C) Oral 80 18 98 %   01/02/24 1337 100/58 97.9  F (36.6  C) Oral 74 18 99 %   01/02/24 1310 123/57 -- -- 79 18 99 %   01/02/24 1239 125/74 97.5  F (36.4  C) Oral 77 18 100 %   01/02/24 1209 139/63 97.5  F (36.4  C) Oral 72 20 100 %   01/02/24 1009 118/59 99  F (37.2  C) Oral 78 20 96 %          Intake/Output Summary (Last 24 hours) at 1/3/2024 0933  Last data filed at 1/3/2024 0500  Gross per 24 hour   Intake 1975 ml   Output 500 ml   Net 1475 ml      Constitutional:   awake, alert, cooperative, no apparent distress, and appears stated age       Eyes:   PERRL, conjunctiva/corneas clear, EOM's intact; no scleral edema or icterus noted        ENT:   Normocephalic, without obvious abnormality, atraumatic, Lips, mucosa, and tongue normal        Lungs:   Normal respiratory effort, no accessory muscle use       Cardiovascular:   Regular rate and rhythm       Abdomen:   Soft, non-tender, non-distended. No rebound or guarding.       Musculoskeletal:   No obvious swelling, bruising or deformity       Skin:   Skin color and texture normal for patient, no rashes or lesions              Data:         All imaging studies reviewed by me.    Results for orders placed or performed during the hospital encounter of 12/31/23 (from the past 24 hour(s))   COLONOSCOPY   Result Value Ref Range    COLONOSCOPY       [facility]  [address] [place]  [address]  _______________________________________________________________________________  Patient Name: Jessica Reynolds        Procedure Date: 1/2/2024 10:22 AM  MRN: [id number]                       Account Number: [id number]  YOB: 1945              Admit Type: Inpatient  Age: 78                               Room: [place] [address]  Note Status: Finalized                Attending MD: TRINA ZAMBRANO MD,   Instrument Name: Adult Colonoscope 1196   _______________________________________________________________________________     Procedure:             Colonoscopy  Indications:           Hematochezia  Providers:             TRINA ZAMBRANO MD  Referring MD:            Medicines:             Monitored Anesthesia Care  Complications:         No immediate complications.  _______________________________________________________________________________  Procedure:             Pre-Anesthesia  Assessment:                         - Prior to the procedure, a History and Physical was                          performed, and patient medications and allergies were                          reviewed. The patient is competent. The risks and                          benefits of the procedure and the sedation options and                          risks were discussed with the patient. All questions                          were answered and informed consent was obtained.                          Patient identification and proposed procedure were                          verified by the physician in the pre-procedure area.                          Mental Status Examination: alert and oriented. Airway                          Examination: normal oropharyngeal airway and neck                          mobility. Respiratory Examination: clear to                          auscultation. CV Examination: normal. Prophylactic                          Antibiotics: The patient does not require proph ylactic                           antibiotics. Prior Anticoagulants: The patient has                          taken no anticoagulant or antiplatelet agents. ASA                          Grade Assessment: IV - A patient with severe systemic                          disease that is a constant threat to life. After                          reviewing the risks and benefits, the patient was                          deemed in satisfactory condition to undergo the                          procedure. The anesthesia plan was to use monitored                          anesthesia care (MAC). Immediately prior to                          administration of medications, the patient was                          re-assessed for adequacy to receive sedatives. The                          heart rate, respiratory rate, oxygen saturations,                          blood pressure, adequacy of pulmonary ventilation, and                          response to care were monitored throughout the                           procedure. The physical status of the patient was                          re-assessed after the procedure.                         After obtaining informed consent, the colonoscope was                          passed under direct vision. Throughout the procedure,                          the patient's blood pressure, pulse, and oxygen                          saturations were monitored continuously. The adult                          colonoscope was introduced through the anus and                          advanced to the ileocolonic anastomosis. The                          colonoscopy was performed without difficulty. The                          patient tolerated the procedure well. The quality of                          the bowel preparation was adequate to identify polyps                          greater than 5 mm in size.                                                                                   Findings:       The perianal and  digital rectal examinations were norm al.       A localized area of hemorrhagic mucosa was found in the transverse        colon. To stop active bleeding, three hemostatic clips were successfully        placed. There was no bleeding at the end of the maneuver.       A single small-mouthed diverticulum was found in the descending colon        with active hemorrhage. To stop active bleeding, two hemostatic clips        were successfully placed. There was no bleeding at the end of the        maneuver.       Multiple small and large-mouthed diverticula were found in the sigmoid        colon, descending colon and transverse colon.                                                                                   Moderate Sedation:       Moderate (conscious) sedation was personally administered by an        anesthesia professional. The following parameters were monitored: oxygen        saturation, heart rate, blood pressure, and response to care.  Impression:            - Hemorrhagic mucosa in the transverse colon. Clips                           were placed.                         - Diverticula in the descending colon with active                          hemorrhage. Clips were placed.                         - Diverticulosis in the sigmoid colon, in the                          descending colon and in the transverse colon.                         - No specimens collected.  Recommendation:        - [facility].                         - Continue to monitor for bleeding                         - If overt re-bleeding occurs, would recommend IR                          consultation. The clips placed more distally in the                          colon are felt to be at higher risk for re-bleeding.                         - NPO today                         - Will follow with you                                                                                     Electronically signed by  Trina Zambrano MD  _______________________________  TRINA ZAMBRANO MD  1/2/2024 12: 14:49 PM  I was physically present for the entire viewing portion of the exam.  __________________________  Signature of teaching physician  B4michelle/Roberto ZAMBRANO MD  Number of Addenda: 0    Note Initiated On: 1/2/2024 10:22 AM  Scope In: 10:46:02 AM  Scope Out: 11:41:13 AM     Hemoglobin   Result Value Ref Range    Hemoglobin 8.4 (L) 11.7 - 15.7 g/dL   Prepare red blood cells (unit)   Result Value Ref Range    Blood Component Type Red Blood Cells     Product Code S3308I82     Unit Status Transfused     Unit Number [id number]     CROSSMATCH Compatible     CODING SYSTEM [id number]     ISSUE DATE AND TIME [id number]     UNIT ABO/RH O+     UNIT TYPE ISBT 5100    Hemoglobin   Result Value Ref Range    Hemoglobin 7.3 (L) 11.7 - 15.7 g/dL   IR Visceral Angiogram    Narrative    [facility]  LOCATION: [facility]  DATE: 1/2/2024    PROCEDURE: SELECTIVE AND SUPERSELECTIVE MESENTERIC ARTERIOGRAPHY.  1.  Ultrasound-guided access of the right common femoral artery. A permanent image was stored.  2.  Digital subtraction celiac arteriography (first order).  3.  Digital subtraction superior mesenteric arteriography (first order).  4.  Digital subtraction inferior mesenteric arteriography (first order).  5.  Digital subtraction ascending left colic arteriography (second order).  6.  Digital subtraction descending left colic arteriography (second order).  7.  Digital subtraction retrograde marginal arteriography to the splenic flexure and transverse colon (third order).  8.  Arterial closure device.  9.  Moderate sedation.    INTERVENTIONAL RADIOLOGIST: Caio Che MD.    INDICATION: 78-year-old female with lower gastrointestinal hemorrhage. Endoscopy revealed potential sources of extravasation in the distal transverse and descending colon.    CONSENT: The risks, benefits and alternatives of the stated  procedure were discussed with the patient  in detail. All questions were answered. Informed consent was given to proceed with the procedure.    MODERATE SEDATION: Versed 1 mg IV; Fentanyl 25 mcg IV.  During the timeout, immediately prior to the administration of medications, the patient was reassessed for adequacy to receive conscious sedation. Under physician supervision, Versed and fentanyl   were administered for moderate sedation. Pulse oximetry, heart rate and blood pressure were continuously monitored by an independent trained observer. The physician spent 32 minutes of face-to-face sedation time with the patient.    CONTRAST: 150 mL Omnipaque.  ANTIBIOTICS: None.  ADDITIONAL MEDICATIONS: None.    FLUOROSCOPIC TIME: 12.1 minutes.  RADIATION DOSE: Air Kerma: 722 mGy.    COMPLICATIONS: No immediate complications.    STERILE BARRIER TECHNIQUE: Maximum sterile barrier technique was used. Cutaneous antisepsis was performed at the operative site with application of 2% chlorhexidine and large sterile drape. Prior to the procedure, the  and assistant performed   hand hygiene and wore hat, mask, sterile gown, and sterile gloves during the entire procedure.    PROCEDURE:    Using real-time ultrasound guidance, access was achieved into the right common femoral artery and conversion was made for a 5 Cypriot vascular sheath which was attached to a continuous heparinized saline infusion.    A Steiner 1 catheter was utilized to selectively engage the celiac artery and digital subtraction arteriography was obtained.    The Steiner 1 catheter was utilized to selectively engage the superior mesenteric artery and digital subtraction arteriography was obtained.    The Steiner 1 catheter was utilized to selectively engage the inferior mesenteric artery and digital subtraction arteriography was obtained.    In order to obtain better purchase the access sheath was exchanged for a 25 cm 5 Cypriot vascular sheath. Through this a  Ej catheter was utilized to selectively reengage the inferior mesenteric artery. Through this a Progreat microcatheter was   advanced into the ascending left colic branch and digital subtraction arteriography was obtained. The microcatheter was repositioned into the descending left colic branch and digital subtraction arteriography was obtained. The microcatheter was advanced   more distally retrograde through the marginal artery near the splenic flexure of the colon. Repeat digital subtraction arteriography was obtained.    The catheter and sheath were removed with hemostasis achieved via the Perclose suture device with additional manual compression.    FINDINGS:  Ultrasound demonstrates a patent and pulsatile right common femoral artery with moderate atheromatous calcification. A permanent image was stored.    The digital subtraction celiac arteriography shows no identified focus of extravasation. Extensive aortic atheromatous calcification is present.    The digital subtraction superior mesenteric arteriography shows no identified site of extravasation.    The digital subtraction inferior mesenteric arteriography shows no identified site of extravasation.    The digital subtraction ascending left colic arteriography shows no identified site of extravasation. Endoscopic clips are seen near the splenic flexure of the colon.    The digital subtraction descending left colic arteriography shows no identified site of extravasation. Endoscopic clips are seen near the lower descending colon.    The digital subtraction retrograde marginal arteriography to the splenic flexure of the colon shows no identified site of extravasation. Endoscopic clips are seen near the splenic flexure of the colon.      Impression    IMPRESSION:    No identified extravasation on the selective or superselective mesenteric arteriography.       Hemoglobin   Result Value Ref Range    Hemoglobin 7.5 (L) 11.7 - 15.7 g/dL   Extra Tube     Narrative    The following orders were created for panel order Extra Tube.  Procedure                               Abnormality         Status                     ---------                               -----------         ------                     Extra Green Top (Lithium...[[id number]]                      Final result                 Please view results for these tests on the individual orders.   Extra Green Top (Lithium Heparin) Tube   Result Value Ref Range    Hold Specimen JIC    Prepare red blood cells (unit)   Result Value Ref Range    Blood Component Type Red Blood Cells     Product Code A5027P07     Unit Status Transfused     Unit Number [id number]     CROSSMATCH Compatible     CODING SYSTEM [id number]     ISSUE DATE AND TIME [id number]     UNIT ABO/RH O+     UNIT TYPE ISBT 5100    Hemoglobin   Result Value Ref Range    Hemoglobin 8.7 (L) 11.7 - 15.7 g/dL   Basic metabolic panel   Result Value Ref Range    Sodium 142 135 - 145 mmol/L    Potassium 4.2 3.4 - 5.3 mmol/L    Chloride 112 (H) 98 - 107 mmol/L    Carbon Dioxide (CO2) 19 (L) 22 - 29 mmol/L    Anion Gap 11 7 - 15 mmol/L    Urea Nitrogen 14.5 8.0 - 23.0 mg/dL    Creatinine 0.96 (H) 0.51 - 0.95 mg/dL    GFR Estimate 60 (L) >60 mL/min/1.73m2    Calcium 7.4 (L) 8.8 - 10.2 mg/dL    Glucose 113 (H) 70 - 99 mg/dL   Magnesium   Result Value Ref Range    Magnesium 1.6 (L) 1.7 - 2.3 mg/dL   CBC with platelets   Result Value Ref Range    WBC Count 12.3 (H) 4.0 - 11.0 10e3/uL    RBC Count 2.64 (L) 3.80 - 5.20 10e6/uL    Hemoglobin 7.8 (L) 11.7 - 15.7 g/dL    Hematocrit 23.3 (L) 35.0 - 47.0 %    MCV 88 78 - 100 fL    MCH 29.5 26.5 - 33.0 pg    MCHC 33.5 31.5 - 36.5 g/dL    RDW 18.6 (H) 10.0 - 15.0 %    Platelet Count 89 (L) 150 - 450 10e3/uL   Prepare red blood cells (unit)   Result Value Ref Range    Blood Component Type Red Blood Cells     Product Code W4218J51     Unit Status Ready for issue     Unit Number [id number]     CROSSMATCH Compatible      CODING SYSTEM [id number]            Ghislaine Morgan PA-C  [facility]  [address] [address]

## 2024-01-03 NOTE — SIGNIFICANT EVENT
Significant Event Note    Time of event: 9:35 PM January 2, 2024    Description of event:    Notified patient had about 500ml blood per rectum.  On exam: stable, alert oriented, having some abdominal cramping, lots of blood in hat in bedside commode  Vitals: BP down trending to 104/56, Pulse uptrending to 97    Sent stat Hgb    Turned fluids up to 125  Last hgb 7.3, ordered 1 unit to be transfused    GI paged:  see colonoscopy note from earlier today, they tried clipping the bleeds but were unable to definitively find and control the bleed, they recommended reaching out to IR    IR paged:  they did just do an angiogram this evening and did not see a bleed. They recommended continue to resuscitate and page back if patients bleed is outdoing the resuscitation.  Multiple reasons they did not want to go back in but they can if patient is crashing.      Plan:  - giving 1 Unit  - continue fluids  - trend Hgb  - trend vitals  - Page IR if becoming vitally unstable  - FYI GI if patient becoming vitally unstable    Discussed with: bedside nurse    Rosendo Leon MD

## 2024-01-03 NOTE — PLAN OF CARE
"  Problem: Adult Inpatient Plan of Care  Goal: Optimal Comfort and Wellbeing  Outcome: Progressing     Problem: Gastrointestinal Bleeding  Goal: Optimal Coping with Acute Illness  Outcome: Progressing   Hemoglobin this am was 7.8.  One unit PRBC's given per MD order.  No transfusion reaction noted.  Vital signs stable.      No complaints of pain or dizziness.   Pt states she is tired and just feels \" whiped out.\"      Assist of one to bedside commode.  One loose bloody stool noted this day shift.                          "

## 2024-01-03 NOTE — PLAN OF CARE
Problem: Adult Inpatient Plan of Care  Goal: Optimal Comfort and Wellbeing  Outcome: Progressing     Problem: Anemia  Goal: Anemia Symptom Improvement  Outcome: Progressing  Intervention: Monitor and Manage Anemia  Recent Flowsheet Documentation  Taken 1/3/2024 0305 by Maribell Luis, RN  Safety Promotion/Fall Prevention:   activity supervised   assistive device/personal items within reach   clutter free environment maintained   nonskid shoes/slippers when out of bed   patient and family education   safety round/check completed  Taken 1/2/2024 2345 by Maribell Luis, RN  Safety Promotion/Fall Prevention:   activity supervised   assistive device/personal items within reach   clutter free environment maintained   nonskid shoes/slippers when out of bed   patient and family education   safety round/check completed     Goal Outcome Evaluation:  Blood transfusion  completed at 2345. No current signs or symptoms of transfusion reaction noted.  Pt had another 500 ml watery, red colored stool.  House officer updated of second occurrence.  Ordered to have hemoglobin checked at that time and result was 8.7.  No further bowel movements.  Noted.  VSS the rest of the shift.  Pt. Endorsed feeling tired and fatigued when getting up to commode.

## 2024-01-03 NOTE — PROGRESS NOTES
[facility]    Medicine Progress Note - Hospitalist Service    Date of Admission:  12/31/2023    Assessment & Plan   Jessica Reynolds is a 78 year old female admitted on 12/31/2023. She has history of urothelial cancer status post resection in early 2023, COPD, HTN, HLD, previous heavy alcohol use here for BRBPR     1. Painless bright red blood per rectum  -Acute blood loss anemia  -multiple episodes of bright red blood per rectum since admission  - Hb 12.6 -> 11.0-->9--> 6.4-->8.4-->7.3-->8.7-->7.8--still having ongoing bleeding  -CTA showing prominent intramural vascularity of entire colon concerning for angiodysplasia, colonic diverticulosis  -Colonoscopy done on 1/2/24-Hemorrhagic mucosa in the transverse colon. Multiple clips placed--per GI   1st in transverse was area of friable mucosa vs dieulafoy--clips x3     2nd in descending colon likely actively bleeding diverticulum--clips x2    -ongoing bleeding with symptoms after colon, so IR took and did angio--no coils placed 1/2/24  -has required 4 units so far and this am getting 5th unit now for ongoing bleeding this am  -get surgery to see  -keep npo  -iv ppi     2.Mild J LUIS  -Cr baseline 0.86, here 0.96  -IV hydration, Monitor Cr     3.COPD  -On room air  -Home inhalers     4.History of urothelial cancer  -Sees [facility]  -Status post resection of tumor a few months ago  -She states has followup scheduled in April 5. Prediabetes  HbA1c 5.7     6.Mild thrombocytopenia  Not on chemical DVT prophylaxis  Monitor PC--89 today      7.Mood, home Zoloft, Remeron    8.Hypothyroid, home Synthroid    9.Tremor, home primidone    10.HLD, home statin    11.Hypertension, holding med, lower bp now     12.Incidental findings  - Cholelithiasis with multiple tiny gallstones, no cholecystitis nor bile duct dilation  - Multiple pancreatic calcifications compatible with chronic pancreatitis  - Supraumbilical hernia, no obstruction    13.  Hypomag  -iv mag    Tried to call daughter in law, number not working at 1209            Diet: NPO for Medical/Clinical Reasons Except for: Meds    DVT Prophylaxis: Pneumatic Compression Devices  Torres Catheter: Not present  Lines: None     Cardiac Monitoring: ACTIVE order. Indication: gib  Code Status: No CPR- Do NOT Intubate      Clinically Significant Risk Factors            # Hypomagnesemia: Lowest Mg = 1.6 mg/dL in last 2 days, will replace as needed     # Thrombocytopenia: Lowest platelets = 89 in last 2 days, will monitor for bleeding   # Hypertension: Noted on problem list                   Disposition Plan      Expected Discharge Date: 01/06/2024    Discharge Delays: Lab Result Pending (enter specific test & time in comments)  Specialist Consult (enter specialist & decision needed in comments)    Discharge Comments: Pending results of colonoscopy            Agnieszka Landrum MD  Hospitalist Service  [facility]  Securely message with [employer] (more info)  Text page via [employer] Paging/Directory   ______________________________________________________________________    Interval History   Ongoing bleeding rectal overnight--got another unit pRBC last pm  This am more bleeding noted  Some off and on pain lower abd  She feels exhausted about all of this   She agreed to surgical consult    Physical Exam   Vital Signs: Temp: 97.9  F (36.6  C) Temp src: Oral BP: 132/62 Pulse: 86   Resp: 18 SpO2: 98 % O2 Device: None (Room air) Oxygen Delivery: 2 LPM  Weight: 121 lbs 4.05 oz    Constitutional: awake, fatigued, alert, cooperative, and no apparent distress  Respiratory: no increased work of breathing, good air exchange, no retractions, and clear to auscultation  Cardiovascular: regular rate and rhythm and normal S1 and S2  GI: normal bowel sounds, soft, non-distended, and non-tender  Skin: no bruising or bleeding  Musculoskeletal: no lower extremity pitting edema present  Neurologic: Mental Status  Exam:  Level of Alertness:   awake  Neuropsychiatric: General: normal, calm, and normal eye contact    Medical Decision Making       50 MINUTES SPENT BY ME on the date of service doing chart review, history, exam, documentation & further activities per the note.      Data     I have personally reviewed the following data over the past 24 hrs:    12.3 (H)  \   7.8 (L)   / 89 (L)     142 112 (H) 14.5 /  113 (H)   4.2 19 (L) 0.96 (H) \       Imaging results reviewed over the past 24 hrs:   Recent Results (from the past 24 hour(s))   IR Visceral Angiogram    Narrative    [facility]  LOCATION: [facility]  DATE: 1/2/2024    PROCEDURE: SELECTIVE AND SUPERSELECTIVE MESENTERIC ARTERIOGRAPHY.  1.  Ultrasound-guided access of the right common femoral artery. A permanent image was stored.  2.  Digital subtraction celiac arteriography (first order).  3.  Digital subtraction superior mesenteric arteriography (first order).  4.  Digital subtraction inferior mesenteric arteriography (first order).  5.  Digital subtraction ascending left colic arteriography (second order).  6.  Digital subtraction descending left colic arteriography (second order).  7.  Digital subtraction retrograde marginal arteriography to the splenic flexure and transverse colon (third order).  8.  Arterial closure device.  9.  Moderate sedation.    INTERVENTIONAL RADIOLOGIST: Caio Che MD.    INDICATION: 78-year-old female with lower gastrointestinal hemorrhage. Endoscopy revealed potential sources of extravasation in the distal transverse and descending colon.    CONSENT: The risks, benefits and alternatives of the stated procedure were discussed with the patient  in detail. All questions were answered. Informed consent was given to proceed with the procedure.    MODERATE SEDATION: Versed 1 mg IV; Fentanyl 25 mcg IV.  During the timeout, immediately prior to the administration of medications, the patient was reassessed for  adequacy to receive conscious sedation. Under physician supervision, Versed and fentanyl   were administered for moderate sedation. Pulse oximetry, heart rate and blood pressure were continuously monitored by an independent trained observer. The physician spent 32 minutes of face-to-face sedation time with the patient.    CONTRAST: 150 mL Omnipaque.  ANTIBIOTICS: None.  ADDITIONAL MEDICATIONS: None.    FLUOROSCOPIC TIME: 12.1 minutes.  RADIATION DOSE: Air Kerma: 722 mGy.    COMPLICATIONS: No immediate complications.    STERILE BARRIER TECHNIQUE: Maximum sterile barrier technique was used. Cutaneous antisepsis was performed at the operative site with application of 2% chlorhexidine and large sterile drape. Prior to the procedure, the  and assistant performed   hand hygiene and wore hat, mask, sterile gown, and sterile gloves during the entire procedure.    PROCEDURE:    Using real-time ultrasound guidance, access was achieved into the right common femoral artery and conversion was made for a 5 Mosotho vascular sheath which was attached to a continuous heparinized saline infusion.    A Steiner 1 catheter was utilized to selectively engage the celiac artery and digital subtraction arteriography was obtained.    The Steiner 1 catheter was utilized to selectively engage the superior mesenteric artery and digital subtraction arteriography was obtained.    The Steiner 1 catheter was utilized to selectively engage the inferior mesenteric artery and digital subtraction arteriography was obtained.    In order to obtain better purchase the access sheath was exchanged for a 25 cm 5 Mosotho vascular sheath. Through this a Ej catheter was utilized to selectively reengage the inferior mesenteric artery. Through this a Progreat microcatheter was   advanced into the ascending left colic branch and digital subtraction arteriography was obtained. The microcatheter was repositioned into the descending left colic branch and  digital subtraction arteriography was obtained. The microcatheter was advanced   more distally retrograde through the marginal artery near the splenic flexure of the colon. Repeat digital subtraction arteriography was obtained.    The catheter and sheath were removed with hemostasis achieved via the Perclose suture device with additional manual compression.    FINDINGS:  Ultrasound demonstrates a patent and pulsatile right common femoral artery with moderate atheromatous calcification. A permanent image was stored.    The digital subtraction celiac arteriography shows no identified focus of extravasation. Extensive aortic atheromatous calcification is present.    The digital subtraction superior mesenteric arteriography shows no identified site of extravasation.    The digital subtraction inferior mesenteric arteriography shows no identified site of extravasation.    The digital subtraction ascending left colic arteriography shows no identified site of extravasation. Endoscopic clips are seen near the splenic flexure of the colon.    The digital subtraction descending left colic arteriography shows no identified site of extravasation. Endoscopic clips are seen near the lower descending colon.    The digital subtraction retrograde marginal arteriography to the splenic flexure of the colon shows no identified site of extravasation. Endoscopic clips are seen near the splenic flexure of the colon.      Impression    IMPRESSION:    No identified extravasation on the selective or superselective mesenteric arteriography.

## 2024-01-03 NOTE — PROGRESS NOTES
Care Management Follow Up    Length of Stay (days): 3    Expected Discharge Date: 01/06/2024     Concerns to be Addressed:  GI status, bleeding, monitoring hemoglobin, Surgery consult       Patient plan of care discussed at interdisciplinary rounds: Yes    Anticipated Discharge Disposition:  home     Anticipated Discharge Services:  none    Anticipated Discharge DME:  none    Additional Information:  Patient history of urothelial cancer status post resection in early 2023, COPD, HTN, HLD, previous heavy alcohol use here for BRBPR. GI following. Surgery consulted.          Social History:  Patient lives with her daughter Isabelle and is independent with activities of daily living at baseline. Family to transport at discharge.      1/3/24:  Planning for return home at discharge.        Verito Garcia RN

## 2024-01-03 NOTE — PROVIDER NOTIFICATION
Morgan paged around 2045 for concern over bloody stool. VSS. Roughly 500ml of bright blood output per rectum.

## 2024-01-04 NOTE — OP NOTE
[facility]  Operative Note    Pre-operative diagnosis: BRBPR (bright red blood per rectum) [K62.5]   Post-operative diagnosis GI hemorrhage from sigmoid diverticulum   Procedure: Procedure(s):  LAPAROSCOPIC EXPLORATION; WITH PARTIAL COLON RESECTION; INCISIONAL HERNIA REPARI  COLONOSCOPY   Surgeon: Caden Jenkins MD   Assistants(s): Vero Sun PA-C.  Assistance was necessary for operation of the laparoscope   Anesthesia: General    Estimated blood loss: Less than 100 ml    Total IV fluids: (See anesthesia record)   Blood transfusion: No transfusion was given during surgery   Total urine output: (See anesthesia record)   Drains: Charles   Specimens: Descending colon and partial sigmoid colon   Implants: None   Findings: Intraluminal hemorrhage from the descending colon/sigmoid colon junction.  Negative leak test.  Incisional hernia, 4.5 cm in size. .   Complications: None.   Condition: Stable       Description of procedure:  The patient was brought to the operating room where after induction of general anesthesia with endotracheal intubation she was positioned in lithotomy with the right arm tucked.  She was then prepped and draped in sterile fashion.  After procedural timeout, we began by accessing the abdomen with an optical trocar in the right upper quadrant.  There were adhesions between bowel and omentum along the midline, with the stomach and omentum herniating through the epigastric incisional hernia.  We were able to maneuver to the left of these adhesions where the left abdominal wall was free of adhesions.  4 additional trocars were ultimately placed along the midline just to the left of the midline adhesions.  A bowel clamp was placed along the proximal descending colon.  At this point we had our endoscopist Dr. Curiel come in and perform on table colonoscopy.  During the course of the colonoscopy one of the diverticulum on the distal sigmoid colon perforated, and was repaired  with a 3-0 absorbable V-Loc suture.      We are able to see the site of previous clipping, which was in the mid descending colon.  There was however a site of active hemorrhage coming from the colon at the junction of the descending colon and sigmoid.  This was not in a position where we could easily oversew it externally.  It was also not in the position that he was able to place clips.  There was significant burden of diverticulosis in the proximal sigmoid colon and distal descending colon as well.  Given the concern for multifocal bleeding, we elected to proceed with resection of the portion of colon that appeared to have the majority of diverticulum as well as the site of active bleeding.  The colonoscope was removed.    We next utilized 3-0 silk stay sutures to affix the midportion of the sigmoid colon at its apex to the proximal descending colon.  Opposing colotomies were then made and a colocolostomy created with a single firing of the blue load 60 mm laparoscopic stapler.  The common colotomy was then closed with a running 2-0 Vicryl suture.  Mesenteric defects were then made in the sigmoid colon and descending colon adjacent to our anastomosis and the descending colon and sigmoid colon were divided with 2 firings of the 60 mm blue load stapler.  The posterior aspect of the staple line along the sigmoid colon was partially disrupted such that we could see mucosa.  We therefore utilized another firing of the blue load 60 mm stapler to excise this and obtain a intact staple line.  We then proceeded to divide the mesentery of our surgical specimen close to the wall of the colon itself to avoid devascularization of the remaining sigmoid colon.  The specimen was then tucked in the pelvis.    We next had our GI colleagues come back in and perform on table colonoscopy for the purpose of a leak test which was negative, as well as to assess for any retained sites of bleeding, none of which were identified.  The  mucosa of the proximal and distal colon with respect to the anastomosis appeared healthy and well-perfused.  The colonoscope was then removed.  A Charles drain was placed in through the right upper quadrant port site and placed along the left paracolic gutter.  At this point we made a 5 cm incision in the upper abdomen directly over the incisional hernia which was 4.5 cm in size.  The hernia sac was bluntly dissected from the surrounding fatty tissues and then excised at the level of the fascia.  We brought our surgical specimen out through this defect, and then proceeded to close the hernia defect with interrupted figure-of-eight 0 Ethibond sutures.  The skin incisions were then closed with interrupted and running 4-0 Vicryl sutures.    Caden Jenkins MD, FACS  [phone]  [facility]  General and Bariatric Surgery

## 2024-01-04 NOTE — ANESTHESIA PREPROCEDURE EVALUATION
Anesthesia Pre-Procedure Evaluation    Patient: Jessica Reynolds   MRN: [id number] : 1945        Procedure : Procedure(s):  LAPAROSCOPY, repair versus resection of bleeding diverticulum (Abdomen)           Past Medical History:   Diagnosis Date    Anxiety     Arthritis     Bladder mass     Carotid stenosis     right    COPD (chronic obstructive pulmonary disease) (H)     Essential tremor     Hepatic steatosis     History of rectal cancer     Hypertension     Macular degeneration (senile) of retina     Osteoporosis     Rosacea     Thyroid disease     Vitamin D deficiency       Past Surgical History:   Procedure Laterality Date    APPENDECTOMY      COLONOSCOPY N/A 2024    Procedure: COLONOSCOPY WITH HEMOSTASIS;  Surgeon: Michael Curiel MD;  Location: [facility] [place] OR    COMBINED CYSTOSCOPY, RETROGRADES, EXCHANGE STENT URETER(S) Left 3/8/2023    Procedure: CYSTOSCOPY WITH LEFT RETROGRADE PYELOGRAM LEFT URETEROSCOPY, BRUSH AND REGULAR BIOPSY LEFT URETER AND LEFT URETERAL STENT PLACEMENT;  Surgeon: Suresh Samuels MD;  Location: [facility] [place] OR    EYE SURGERY      IR VISCERAL ANGIOGRAM  2024      Allergies   Allergen Reactions    Bactrim [Sulfamethoxazole-Trimethoprim] Nausea and Vomiting and Diarrhea     Critically high      Sudafed [Pseudoephedrine] Dizziness     Light headed      Social History     Tobacco Use    Smoking status: Every Day     Packs/day: .5     Types: Cigarettes    Smokeless tobacco: Not on file    Tobacco comments:     4 cigarettes in the past 2 weeks   Substance Use Topics    Alcohol use: Yes     Alcohol/week: 2.0 standard drinks of alcohol     Types: 2 Standard drinks or equivalent per week     Comment: N/A beer every other day, zaid 3-5 drinks er week      Wt Readings from Last 1 Encounters:   23 55 kg (121 lb 4.1 oz)        Anesthesia Evaluation   Pt has had prior anesthetic.         ROS/MED HX  ENT/Pulmonary:     (+)                         moderate,  COPD,               Neurologic: Comment: CONCLUSION:   HEAD CT:   1.  Mild presumed chronic small vessel ischemic change.   2.  Mild generalized volume loss.   3.  No hydrocephalus.     HEAD CTA:   1.  Occluded right internal carotid artery. This vessel is reconstituted at the   level of the ophthalmic artery.   2.  Normal anterior and middle cerebral arteries.   3.  Normal posterior circulation.     NECK CTA:   1.  Chronic atheromatous occlusion of the right internal carotid artery.   2.  Normal left carotid system. No left carotid stenosis as per NASCET criteria.   3.  Dominant right vertebral artery.   4.  Aortic origin of the left vertebral artery.    - neg neurologic ROS     Cardiovascular:  - neg cardiovascular ROS   (+)  hypertension- -   -  - -                                      METS/Exercise Tolerance: >4 METS    Hematologic: Comments: Low platelets=89k    (+)      anemia, history of blood transfusion, no previous transfusion reaction,        Musculoskeletal:  - neg musculoskeletal ROS (+)  arthritis,             GI/Hepatic:  - neg GI/hepatic ROS   (+)             liver disease (fatty liver),       Renal/Genitourinary:  - neg Renal ROS   (+) renal disease (CR stable. 0.94), type: CRI, Pt does not require dialysis,           Endo:  - neg endo ROS   (+)          thyroid problem, hypothyroidism,           Psychiatric/Substance Use:  - neg psychiatric ROS     Infectious Disease:  - neg infectious disease ROS     Malignancy:  - neg malignancy ROS (+) Malignancy, History of GI.    Other:  - neg other ROS          Physical Exam    Airway  airway exam normal      Mallampati: II       Respiratory Devices and Support         Dental  no notable dental history         Cardiovascular   cardiovascular exam normal       Rhythm and rate: regular and normal     Pulmonary   pulmonary exam normal        breath sounds clear to auscultation           OUTSIDE LABS:  CBC:   Lab Results   Component Value Date    WBC 10.3 01/04/2024    WBC 12.3  "(H) 01/03/2024    HGB 6.9 (LL) 01/04/2024    HGB 7.4 (L) 01/04/2024    HCT 20.4 (L) 01/04/2024    HCT 23.3 (L) 01/03/2024    PLT 82 (L) 01/04/2024    PLT 89 (L) 01/03/2024     BMP:   Lab Results   Component Value Date     01/04/2024     01/03/2024    POTASSIUM 3.5 01/04/2024    POTASSIUM 4.2 01/03/2024    CHLORIDE 109 (H) 01/04/2024    CHLORIDE 112 (H) 01/03/2024    CO2 20 (L) 01/04/2024    CO2 19 (L) 01/03/2024    BUN 11.8 01/04/2024    BUN 14.5 01/03/2024    CR 0.94 01/04/2024    CR 0.96 (H) 01/03/2024    GLC 94 01/04/2024    GLC 90 01/04/2024     COAGS:   Lab Results   Component Value Date    PTT 32 12/31/2023    INR 1.10 12/31/2023     POC: No results found for: \"BGM\", \"HCG\", \"HCGS\"  HEPATIC:   Lab Results   Component Value Date    ALBUMIN 3.8 12/31/2023    PROTTOTAL 6.4 12/31/2023    ALT 21 12/31/2023    AST 34 12/31/2023    ALKPHOS 89 12/31/2023    BILITOTAL 0.6 12/31/2023     OTHER:   Lab Results   Component Value Date    A1C 5.7 (H) 12/31/2023    JERI 7.4 (L) 01/04/2024    MAG 1.9 01/04/2024    TSH 3.74 06/20/2023       Anesthesia Plan    ASA Status:  4, emergent    NPO Status:  NPO Appropriate    Anesthesia Type: General.     - Airway: ETT   Induction: Intravenous, Propofol.   Maintenance: Balanced.   Techniques and Equipment:     - Lines/Monitors: 2nd IV (possible A Line)     Consents    Anesthesia Plan(s) and associated risks, benefits, and realistic alternatives discussed. Questions answered and patient/representative(s) expressed understanding.     - Discussed: Risks, Benefits and Alternatives for BOTH SEDATION and the PROCEDURE were discussed     - Discussed with:  Patient      - Extended Intubation/Ventilatory Support Discussed: No.      - Patient is DNR/DNI Status: Yes             Suspend during perioperative period? Yes.             Agree to: chemical resuscitation, chest compression/defibrillation.   Use of blood products discussed: Yes.     - Discussed with: Patient.     - Consented: " consented to blood products     Postoperative Care    Pain management: IV analgesics, Oral pain medications.   PONV prophylaxis: Ondansetron (or other 5HT-3), Dexamethasone or Solumedrol     Comments:    Other Comments: GAETT  Antiemetics  2 IV  A line to maintain normotension (given carotid occlusion). Left arm will be out  Type and screen pending - surgeon does not want to wait as case deemed emergent  Consider plt infusion  Ketamine after induction  Replace calcium             Ang Kim MD    I have reviewed the pertinent notes and labs in the chart from the past 30 days and (re)examined the patient.  Any updates or changes from those notes are reflected in this note.

## 2024-01-04 NOTE — PLAN OF CARE
Goal Outcome Evaluation:     0300-1295... Pt had a quiet shift, c/o lower back pain 7/10 and was given PRN Tylenol 650 mg at HS. She remains on Hgb check every 4 hour, at 1900, it was 8.4 and 7.7 at 2200. Dr. Cristobal was notified. Pt did not c/o any dizziness, assist of 1 to the bedside commode and voided, no rectal bleeding noted. She is vitally stable, will continue to monitor.

## 2024-01-04 NOTE — PROVIDER NOTIFICATION
Text page to dr Chowdhury regarding 7.7 hgb.     -per phys continue to monitor for now. Will watch for next hemoglobin

## 2024-01-04 NOTE — PROGRESS NOTES
[facility]    Medicine Progress Note - Hospitalist Service    Date of Admission:  12/31/2023    Assessment & Plan   Jessica Reynolds is a 78 year old female admitted on 12/31/2023. She has history of urothelial cancer status post resection in early 2023, COPD, HTN, HLD, previous heavy alcohol use here for BRBPR     1. Painless bright red blood per rectum  -Acute blood loss anemia  -multiple episodes of bright red blood per rectum since admission  - Hb 12.6 -> 11.0-->9--> 6.4-->8.4-->7.3-->8.7-->7.8-->7.4-->now this am 6.9 --still having ongoing bleeding this am--getting another unit of pRBC now  -CTA showing prominent intramural vascularity of entire colon concerning for angiodysplasia, colonic diverticulosis  -Colonoscopy done on 1/2/24-Hemorrhagic mucosa in the transverse colon. Multiple clips placed--per GI   1st in transverse was area of friable mucosa vs dieulafoy--clips x3     2nd in descending colon likely actively bleeding diverticulum--clips x2    -ongoing bleeding with symptoms after colon, so IR took and did angio--no coils placed 1/2/24  -getting her 6 th unit now pRBC   -I updated GI this am and they will consider plan with scope in OR with surgery for possible surg eval  -keep npo  -iv ppi     2.Mild J LUIS  -Cr baseline 0.86, here 0.94  -IV hydration, Monitor Cr     3.COPD  -On room air  -Home inhalers     4.History of urothelial cancer  -Sees [facility]  -Status post resection of tumor a few months ago  -She states has followup scheduled in April 5. Prediabetes  HbA1c 5.7     6.Mild thrombocytopenia  Not on chemical DVT prophylaxis  Monitor PC--82 today      7.Mood, home Zoloft, Remeron    8.Hypothyroid, home Synthroid    9.Tremor, home primidone    10.HLD, home statin    11.Hypertension, holding med, lower bp now     12.Incidental findings  - Cholelithiasis with multiple tiny gallstones, no cholecystitis nor bile duct dilation  - Multiple pancreatic calcifications  compatible with chronic pancreatitis  - Supraumbilical hernia, no obstruction    13. Hypomag  -mag good today    14.Pre-op  -would not delay urgent surgery   -with ongoing bleeding suspect needs it to stop this bleeding   -will check pre-op EKG    --I called daughter Isabelle at [phone] to update and need for surgery at 0922              Diet: NPO for Medical/Clinical Reasons Except for: Meds    DVT Prophylaxis: Pneumatic Compression Devices  Torres Catheter: Not present  Lines: None     Cardiac Monitoring: ACTIVE order. Indication: gib  Code Status: No CPR- Do NOT Intubate      Clinically Significant Risk Factors            # Hypomagnesemia: Lowest Mg = 1.6 mg/dL in last 2 days, will replace as needed     # Thrombocytopenia: Lowest platelets = 82 in last 2 days, will monitor for bleeding   # Hypertension: Noted on problem list                   Disposition Plan     Expected Discharge Date: 01/06/2024    Discharge Delays: Lab Result Pending (enter specific test & time in comments)  Specialist Consult (enter specialist & decision needed in comments)    Discharge Comments: Pending results of colonoscopy            Agnieszka Landrum MD  Hospitalist Service  [facility]  Securely message with [employer] (more info)  Text page via [employer] Paging/Directory   ______________________________________________________________________    Interval History   She had 3 more bloody stools,  no abd pain  Frustrated with how long this has been  Agreed to OR if needed    Physical Exam   Vital Signs: Temp: 98  F (36.7  C) Temp src: Oral BP: 118/53 Pulse: 71   Resp: 18 SpO2: 99 % O2 Device: None (Room air)    Weight: 121 lbs 4.05 oz    Constitutional: awake, alert, cooperative, and no apparent distress  Respiratory: no increased work of breathing, good air exchange, no retractions, and diminished breath sounds right base and left base  Cardiovascular: regular rate and rhythm and normal S1 and S2  GI: normal bowel  sounds, soft, non-distended, and non-tender  Skin: no bruising or bleeding  Musculoskeletal: no lower extremity pitting edema present  Neurologic: Mental Status Exam:  Level of Alertness:   awake  Neuropsychiatric: General: normal, calm, and normal eye contact  Affect: flat    Medical Decision Making       50 MINUTES SPENT BY ME on the date of service doing chart review, history, exam, documentation & further activities per the note.      Data     I have personally reviewed the following data over the past 24 hrs:    10.3  \   6.9 (LL)   / 82 (L)     138 109 (H) 11.8 /  94   3.5 20 (L) 0.94 \       Imaging results reviewed over the past 24 hrs:   No results found for this or any previous visit (from the past 24 hour(s)).

## 2024-01-04 NOTE — ANESTHESIA PROCEDURE NOTES
Airway       Patient location during procedure: OR       Procedure Start/Stop Times: 1/4/2024 10:01 AM  Staff -        CRNA: Alisson Dubois APRN CRNA       Performed By: CRNA  Consent for Airway        Urgency: elective  Indications and Patient Condition       Indications for airway management: alessandro-procedural       Induction type:intravenous       Mask difficulty assessment: 2 - vent by mask + OA or adjuvant +/- NMBA    Final Airway Details       Final airway type: endotracheal airway       Successful airway: ETT - single and Oral  Endotracheal Airway Details        ETT size (mm): 7.0       Successful intubation technique: direct laryngoscopy       DL Blade Type: [place] 2       Grade View of Cords: 2       Adjucts: stylet       Position: Right       Measured from: lips       Secured at (cm): 22       Bite block used: None    Post intubation assessment        Placement verified by: capnometry, equal breath sounds and chest rise        Number of attempts at approach: 1       Number of other approaches attempted: 0       Secured with: tape       Ease of procedure: easy       Dentition: Intact and Unchanged    Medication(s) Administered   Medication Administration Time: 1/4/2024 10:01 AM

## 2024-01-04 NOTE — ANESTHESIA POSTPROCEDURE EVALUATION
Patient: Jessica Reynolds    Procedure: Procedure(s):  LAPAROSCOPIC EXPLORATION; WITH PARTIAL COLON RESECTION; INCISIONAL HERNIA REPARI  COLONOSCOPY       Anesthesia Type:  General    Note:  Disposition: Inpatient   Postop Pain Control: Uneventful            Sign Out: Well controlled pain   PONV: No   Neuro/Psych: Uneventful            Sign Out: Acceptable/Baseline neuro status   Airway/Respiratory: Uneventful            Sign Out: Acceptable/Baseline resp. status   CV/Hemodynamics: Uneventful            Sign Out: Acceptable CV status; No obvious hypovolemia; No obvious fluid overload   Other NRE:    DID A NON-ROUTINE EVENT OCCUR?            Last vitals:  Vitals Value Taken Time   /55 01/04/24 1436   Temp     Pulse 78 01/04/24 1444   Resp 17 01/04/24 1444   SpO2 95 % 01/04/24 1444   Vitals shown include unfiled device data.    Electronically Signed By: Ang Kim MD  January 4, 2024  4:10 PM

## 2024-01-04 NOTE — PLAN OF CARE
Goal Outcome Evaluation:    Pt was in surgery most of day shift.  Returned to unit at 1450.  Sleepy.  Vital signs stable.  Oxygen saturations 95% on 2 liters via nasal canula.  Incisions intact with  small amount of bloody drainage.  RENAE drain emptied for 75 ml of bloody drainage.  Family at bedside.

## 2024-01-04 NOTE — PLAN OF CARE
Goal Outcome Evaluation: Vss. Patient 3 medium bloody bm this am. Reports that she feels weak and dizzy. Vss, bg was 92. Dr Chowdhury was updated and he deferred to day shift. Patient uses bedside commode but needs assist due to weakness. Frusterated that she is still NPO. Coninue to monitor.       Problem: Gastrointestinal Bleeding  Goal: Optimal Coping with Acute Illness  Outcome: Not Progressing     Problem: Gastrointestinal Bleeding  Goal: Optimal Coping with Acute Illness  Outcome: Not Progressing     Problem: Risk for Delirium  Goal: Improved Sleep  Outcome: Progressing

## 2024-01-04 NOTE — ANESTHESIA CARE TRANSFER NOTE
Patient: Jessica Reynolds    Procedure: Procedure(s):  LAPAROSCOPIC EXPLORATION; WITH PARTIAL COLON RESECTION; INCISIONAL HERNIA REPARI  COLONOSCOPY       Diagnosis: BRBPR (bright red blood per rectum) [K62.5]  Diagnosis Additional Information: No value filed.    Anesthesia Type:   General     Note:    Oropharynx: oral airway in place and spontaneously breathing  Level of Consciousness: drowsy  Oxygen Supplementation: face mask  Level of Supplemental Oxygen (L/min / FiO2): 7  Independent Airway: airway patency satisfactory and stable  Dentition: dentition unchanged  Vital Signs Stable: post-procedure vital signs reviewed and stable  Report to RN Given: handoff report given  Patient transferred to: PACU  Comments: OAW in place, patient very obstructive without it in spite of heavy jaw thrust.  Handoff Report: Identifed the Patient, Identified the Reponsible Provider, Reviewed the pertinent medical history, Discussed the surgical course, Reviewed Intra-OP anesthesia mangement and issues during anesthesia, Set expectations for post-procedure period and Allowed opportunity for questions and acknowledgement of understanding  Vitals:  Vitals Value Taken Time   /60 01/04/24 1306   Temp     Pulse 82 01/04/24 1310   Resp 19 01/04/24 1310   SpO2 95 % 01/04/24 1310   Vitals shown include unfiled device data.    Electronically Signed By: PRANAV Garcia CRNA  January 4, 2024  1:12 PM

## 2024-01-05 PROBLEM — D62 ACUTE BLOOD LOSS AS CAUSE OF POSTOPERATIVE ANEMIA: Status: ACTIVE | Noted: 2024-01-01

## 2024-01-05 NOTE — PROGRESS NOTES
"7:58 PM    S: House staff was called by the patient's nurse due to hypotension. Briefly, the patient was admitted for bright red blood per rectum with CT showing prominent intramural vascularity of entire colon, now s/p colectomy.    I went to evaluate the patient and found her in bed, in no distress. Complains of abdominal pain. Denies any chest pain, shortness of breath, nausea, vomiting.     Exam: BP (!) 89/54   Pulse 91   Temp 96.8  F (36  C) (Temporal)   Resp 20   Ht 1.575 m (5' 2\")   Wt 55 kg (121 lb 4.1 oz)   SpO2 97%   BMI 22.18 kg/m    GENERAL: lethargic, in no distress  RESP: no increased work of breathing  CV: extremities well perfused, normal rate  ABDOMEN: soft, non tender; no rebound, no guarding      A/P:   Patient currently receiving unit of blood. Patient received 500 ml bolus. Patient with MAPs in the low 60s. Surgery aware of situation. See note.   - will give additional 500 ml bolus  - will order Torres for better measurement of urine output  - continue to monitor hemoglobin levels    Plan to reach out to surgery with any bright red blood per rectum, recurrent  hypotension.     Discussed with bedside RN      Delia Farley,     [facility]     "

## 2024-01-05 NOTE — PROGRESS NOTES
Jessica Reynolds is POD 1 s/p laparoscopic exploration and partial colon resection for bleeding sigmoid diverticulum.  Overnight was noted to be hypotensive and received 2 boluses of saline as well as another unit of blood.  No bloody bowel movements since surgery      Vitals:    01/04/24 2303 01/05/24 0216 01/05/24 0400 01/05/24 0707   BP: 113/62 109/59 130/60 (!) 152/72   BP Location: Right arm Right arm Right arm Left arm   Pulse: 76 75 80 76   Resp: 11 24 (!) 35 24   Temp:   97.2  F (36.2  C) 97.6  F (36.4  C)   TempSrc:   Temporal Oral   SpO2: 96% 94% 96% 94%   Weight:       Height:             PHYSICAL EXAM:  GEN: No acute distress, appears to be in mild discomfort  LUNGS: CTA bilaterally, mild tachypnea  CV:RRR  ABD: Soft, nondistended.  Appropriate postoperative day 1 tenderness.  Drain output is sanguinous, with an increase in bloody output to 320 cc overnight from 90 on the evening shift  EXT:No cyanosis, 1-2+ pitting edema in all 4 extremities    01/04 0700 - 01/05 0659  In: 2618 [I.V.:2000]  Out: 930 [Urine:370; Drains:485]      A/P:  1. BRBPR (bright red blood per rectum)        Jessica Reynolds is s/p laparoscopic partial colon resection for diverticular bleed.  No longer showing evidence of intraluminal bleeding, she does however appear to have some intra-abdominal bleeding stemming from her surgery yesterday.  At this point not high enough volume to merit reexploration.  Transfusions over the past 3 days consist entirely of packed red blood cells, without any platelets or FFP.  Likely has some coagulopathy secondary to depletion of clotting factors, particularly those in FFP and calcium.    -Calcium gluconate infusion this morning to help correct her hypocalcemia  -1 unit FFP this morning  -Okay for clears  -Continue with Torres catheter for monitoring urine output      Caden Jenkins MD, FACS  Office: [phone]  [facility]   General and Bariatric Surgery    Addendum: Patient has  continued to have bloody output from her surgical drain throughout the course of the day, with a 1 g drop in hemoglobin and borderline urine output.  Overall not comfortable with amount of bleeding still occurring 24 hours after her procedure.  Will plan to take her back to the operating room today for washout and exploration for source of bleeding.  Discussed this with the patient and she is in agreement.    Discontinuing the Nephrology consult as her low UOP is a volume issue, not something else at this point.     Caden Jenkins MD, FACS  [phone]  [facility]  General and Bariatric Surgery

## 2024-01-05 NOTE — PROGRESS NOTES
[facility]    Medicine Progress Note - Hospitalist Service    Date of Admission:  12/31/2023    Assessment & Plan   Jessica Reynolds is a 78 year old female admitted on 12/31/2023. She has history of urothelial cancer status post resection in early 2023, COPD, HTN, HLD, previous heavy alcohol use here for BRBPR     Painless bright red blood per rectum  -Acute blood loss anemia  Bleeding sigmoid diverticulum  S/p laparoscopic exploration and partial colon resection 01/04  Lactic acidosis  -multiple episodes of bright red blood per rectum since admission  -Hemoglobin downtrending with ongoing bleed, getting another unit of PRBC now  -CTA showing prominent intramural vascularity of entire colon concerning for angiodysplasia, colonic diverticulosis  -Colonoscopy done on 1/2/24-Hemorrhagic mucosa in the transverse colon. Multiple clips placed by GI - 1st in transverse was area of friable mucosa vs dieulafoy--clips x3 and 2nd in descending colon likely actively bleeding diverticulum--clips x2  -ongoing bleeding with symptoms, so IR took and did angio--no coils placed 1/2/24  -getting her 7 th unit now pRBC 01/05, also FFP and calcium gluconate due to multiple transfusions  -Patient went for laparoscopic exploration and partial colon resection for bleeding sigmoid diverticulum on 01/04 by surgery  -Patient has been having bloody output from her surgical drain, with drop in hemoglobin and borderline urine output  -Plan to take to the OR today for washout and exploration for source of bleeding  -iv ppi, NPO  -Trend LA     J LUIS  Creatinine baseline 0.86, today up trended to 1.39  Likely prerenal  UO decreased, Has Foleys' monitor  On IV hydration, monitor creatinine    Leukocytosis likely reactive  Monitor wbc     COPD  -On room air  -Home inhalers     History of urothelial cancer  -Sees [facility]  -Status post resection of tumor a few months ago  -She states has followup scheduled in April      Prediabetes  HbA1c 5.7    Mild thrombocytopenia  Not on chemical DVT prophylaxis  Monitor PC     Mood, home Zoloft, Remeron     Hypothyroid, home Synthroid     Tremor, home primidone     HLD, home statin     Hypertension, holding med, lower bp now     Incidental findings  - Cholelithiasis with multiple tiny gallstones, no cholecystitis nor bile duct dilation  - Multiple pancreatic calcifications compatible with chronic pancreatitis  - Supraumbilical hernia, no obstruction    Hypomag  -mag good today    RUE r/o DVT with infiltration  US RUE r/o DVT    -- PICC line placed 01/05        Diet: Clear Liquid Diet    DVT Prophylaxis: Pneumatic Compression Devices  Torres Catheter: PRESENT, indication: Strict 1-2 Hour I&O  Lines: PRESENT      PICC 01/05/24 Double Lumen Left Basilic VAscular Access (POOR); Blood transfusions; Labs-Site Assessment: WDL      Cardiac Monitoring: ACTIVE order. Indication: gib  Code Status: Full Code      Clinically Significant Risk Factors                # Thrombocytopenia: Lowest platelets = 82 in last 2 days, will monitor for bleeding  # Acute Kidney Injury, unspecified: based on a >150% or 0.3 mg/dL increase in last creatinine compared to past 90 day average, will monitor renal function  # Hypertension: Noted on problem list                   Disposition Plan      Expected Discharge Date: 01/06/2024    Discharge Delays: Lab Result Pending (enter specific test & time in comments)  Specialist Consult (enter specialist & decision needed in comments)    Discharge Comments: Pending results of colonoscopy            Idalia Chambers MD  Hospitalist Service  [facility]  Securely message with [employer] (more info)  Text page via [facility] Paging/Directory   ______________________________________________________________________    Interval History   Patient examined at the bedside today, she feels very weak and barely able to talk.  Having bloody output from the drain.  Discussed  with daughter Isabelle, and updated about patient being taken to the OR today.  Answered all questions      Physical Exam   Vital Signs: Temp: 97.3  F (36.3  C) Temp src: Axillary BP: 127/61 Pulse: 85   Resp: 20 SpO2: 95 % O2 Device: Nasal cannula Oxygen Delivery: 1 LPM  Weight: 121 lbs 4.05 oz    Constitutional: looks very weak and lying in the bed  Respiratory: no increased work of breathing, good air exchange, no retractions, and diminished breath sounds right base and left base  Cardiovascular: regular rate and rhythm and normal S1 and S2  GI: soft, non distended, tenderness c/w post op, drain with bloody output  Skin: no bruising or bleeding  Musculoskeletal: no lower extremity pitting edema present  Neurologic: Mental Status Exam:  Level of Alertness:   awake  Neuropsychiatric: General: normal, calm, and normal eye contact  Affect: flat    Medical Decision Making       50 MINUTES SPENT BY ME on the date of service doing chart review, history, exam, documentation & further activities per the note.      Data     I have personally reviewed the following data over the past 24 hrs:    16.5 (H)  \   6.7 (LL)   / 103 (L)     137 110 (H) 21.0 /  163 (H)   4.6 14 (L) 1.39 (H) \     Trop: 40 (H) BNP: N/A     Procal: N/A CRP: N/A Lactic Acid: 3.5 (H)         Imaging results reviewed over the past 24 hrs:   Recent Results (from the past 24 hour(s))   XR Chest Port 1 View    Narrative    EXAM: XR CHEST PORT 1 VIEW  LOCATION: [facility]  DATE: 1/4/2024    INDICATION: crepitus in upper chest, POD0 from partial colon resection  COMPARISON: None.      Impression    IMPRESSION: Heart size and pulmonary vascularity normal. The lungs are clear. No pneumothorax. Extensive subcutaneous gas along the left lateral chest wall and along the right and left base of the neck.

## 2024-01-05 NOTE — PLAN OF CARE
"  Problem: Adult Inpatient Plan of Care  Goal: Plan of Care Review  Description: The Plan of Care Review/Shift note should be completed every shift.  The Outcome Evaluation is a brief statement about your assessment that the patient is improving, declining, or no change.  This information will be displayed automatically on your shift  note.  Outcome: Progressing  Goal: Patient-Specific Goal (Individualized)  Description: You can add care plan individualizations to a care plan. Examples of Individualization might be:  \"Parent requests to be called daily at 9am for status\", \"I have a hard time hearing out of my right ear\", or \"Do not touch me to wake me up as it startles  me\".  Outcome: Progressing  Goal: Absence of Hospital-Acquired Illness or Injury  Outcome: Progressing  Intervention: Identify and Manage Fall Risk  Recent Flowsheet Documentation  Taken 1/5/2024 0000 by Haley Jorge RN  Safety Promotion/Fall Prevention: activity supervised  Intervention: Prevent Skin Injury  Recent Flowsheet Documentation  Taken 1/5/2024 0000 by Haley Jorge RN  Body Position:   turned   left   heels elevated   upper extremity elevated  Intervention: Prevent and Manage VTE (Venous Thromboembolism) Risk  Recent Flowsheet Documentation  Taken 1/5/2024 0000 by Haley Jorge RN  VTE Prevention/Management: SCDs (sequential compression devices) on  Goal: Optimal Comfort and Wellbeing  Outcome: Progressing  Goal: Readiness for Transition of Care  Outcome: Progressing     Problem: Risk for Delirium  Goal: Optimal Coping  Outcome: Progressing  Intervention: Optimize Psychosocial Adjustment to Delirium  Recent Flowsheet Documentation  Taken 1/5/2024 0000 by Haley Jorge RN  Supportive Measures: active listening utilized  Goal: Improved Behavioral Control  Outcome: Progressing  Intervention: Minimize Safety Risk  Recent Flowsheet Documentation  Taken 1/5/2024 0000 by Haley Jorge RN  Enhanced Safety Measures: room near unit " station  Goal: Improved Attention and Thought Clarity  Outcome: Progressing  Goal: Improved Sleep  Outcome: Progressing     Problem: Anemia  Goal: Anemia Symptom Improvement  Outcome: Progressing  Intervention: Monitor and Manage Anemia  Recent Flowsheet Documentation  Taken 1/5/2024 0000 by Haley Jorge RN  Safety Promotion/Fall Prevention: activity supervised     Problem: Gastrointestinal Bleeding  Goal: Optimal Coping with Acute Illness  Outcome: Progressing  Intervention: Optimize Psychosocial Response  Recent Flowsheet Documentation  Taken 1/5/2024 0000 by Haley Jorge RN  Supportive Measures: active listening utilized  Goal: Hemostasis  Outcome: Progressing  Intervention: Manage Gastrointestinal Bleeding  Recent Flowsheet Documentation  Taken 1/5/2024 0000 by Haley Jorge RN  Environmental Support:   calm environment promoted   rest periods encouraged     Problem: Activity Intolerance  Goal: Enhanced Capacity and Energy  Outcome: Progressing  Intervention: Optimize Activity Tolerance  Recent Flowsheet Documentation  Taken 1/5/2024 0000 by Haley Jorge RN  Activity Management: bedrest  Environmental Support:   calm environment promoted   rest periods encouraged   Goal Outcome Evaluation:  Pt very anxious overnight, md came and saw patient at start of shift and vbg was ordered and done. Prn atarax was given and lights decreased. Pt was able to sleep between cares and RR came down to mid 20's from mid 30's. Pt also complained of pain this shift and prn iv dilaudid was given and again patient relaxed and slept following. Pt only had 125cc output this shift and 270cc bright blood in RENAE drain. Pt is alert and able to make her needs known

## 2024-01-05 NOTE — PLAN OF CARE
Shift from 1500 to 2330-      Problem: Anemia  Goal: Anemia Symptom Improvement  1/4/2024 2245 by Yarelis Dutta, RN  Outcome: Progressing  1/4/2024 2245 by Yarelis Dutta, RN  Outcome: Progressing  Intervention: Monitor and Manage Anemia  Recent Flowsheet Documentation  Taken 1/4/2024 2057 by Yarelis Dutta, RN  Safety Promotion/Fall Prevention: activity supervised  Taken 1/4/2024 1545 by Yarelis Dutta RN  Safety Promotion/Fall Prevention: activity supervised       Goal Outcome Evaluation:    Pt arrived from PACU to P2 at around 1445. Family at bedside.    Patient painful and given tylenol because very drowsy. Nauseated and zofran given. Later given dilaudid 0.2mg once when BP improved.     Telemetry is normal sinus rhythm.  Continued to monitor. See notes.   Updated surgery and resident during the shift due to hypotension.   Torres placed.  Hgb every 4hrs.   Hgb at around 1800 was 7.3 before PRBC.Gave 1 unit of PRBC.   Recheck at 2220; Hgb 7.9.  EKG done. PCXR to be done.

## 2024-01-05 NOTE — PROGRESS NOTES
Brief Surgery Note    Called regarding hypotension.    Mentating fine, expected pain from laparotomy.  Earlier some nausea, since resolved.  No further blood per rectum.    Gave 500LR when I was called and waiting for labs    On exam she's in no distress  Breathing comfortably  Normal rate  Expected abdominal pain, dressing c/d/I, thin output from drain, not much out    Hgb 7.3 from 7.7    I suspect she's under-resuscitated from OR.  BP came back up with LR.  Will give a unit of blood as I expect she'll dilute out below 7.  Recheck an hour after.  Call if BRBPR, recurrent hypotension, may need higher level of care.    Ted Lomeli MD    Diagnoses and all orders for this visit:    Bronchospasm, acute  -     albuterol sulfate (VENTOLIN) (2.5 MG/3ML) 0.083% nebulizer solution 2.5 mg  -     albuterol sulfate (2.5 MG/3ML) 0.083% Inhalation Nebu Soln;  Take 3 mL (2.5 mg total) by nebulization ev The child can also have a fever. A child with bronchospasm may be given medicine to take at home. A child with severe bronchospasm may need to stay in the hospital for 1 or more nights.  There, he or she is given intravenous (IV) fluids, breathing treatmen drinks plenty of liquids. Children may prefer cold drinks, frozen desserts, or popsicles. They may also like warm chicken soup or drinks with lemon and honey. Don’t give honey to a child younger than 3year old.   ·  To prevent dehydration and help loosen l and a fever of 100.4°F (38°C) continues for more than 3 days. Date Last Reviewed: 4/9/2016  © 8417-2831 [employer]. [address] [facility], [address] [place] [address]. All rights reserved.  This information is not intended as a substitute for profess

## 2024-01-05 NOTE — PROGRESS NOTES
"GASTROENTEROLOGY PROGRESS NOTE     SUBJECTIVE: very uncomfortable, ashen. Hypotensive over night. Has been seen by surgery who feels she is bleeding internally but no enough      OBJECTIVE:   /57   Pulse 81   Temp 97  F (36.1  C) (Axillary)   Resp 20   Ht 1.575 m (5' 2\")   Wt 55 kg (121 lb 4.1 oz)   SpO2 93%   BMI 22.18 kg/m     Temp (24hrs), Av.3  F (36.3  C), Min:96.3  F (35.7  C), Max:98  F (36.7  C)     No data found.     Intake/Output Summary (Last 24 hours) at 2024 1151  Last data filed at 2024 1115  Gross per 24 hour   Intake 2593 ml   Output 1050 ml   Net 1543 ml      PHYSICAL EXAM   Constitutional: ill appearing female , in bed, no acute distress- ashen   Respiratory: respirations non labored.   Abdomen:tender firm.     I have reviewed the patient's new clinical lab results:   Recent Labs   Lab Test 24  1013 24  0645 24  0338 24  1315 24  0735 24  1457 24  0803 23  2359 23  1325   WBC  --  16.5*  --   --  10.3  --  12.3*   < > 7.7   HGB 7.2* 7.7* 7.9*   < > 6.9*   < > 7.8*   < > 12.6   MCV  --  90  --   --  90  --  88   < > 97   PLT  --  103*  --   --  82*  --  89*   < > 166   INR  --   --   --   --   --   --   --   --  1.10    < > = values in this interval not displayed.      Recent Labs   Lab Test 24  0603 24  2252 24    137 138   POTASSIUM 4.6 4.5 3.5   CHLORIDE 110* 110* 109*   CO2 14* 18* 20*   BUN 21.0 16.1 11.8   CR 1.39* 1.13* 0.94   ANIONGAP 13 9 9   JERI 7.7* 7.7* 7.4*      Recent Labs   Lab Test 23  1325 23  0859 23  1427   ALBUMIN 3.8 4.2 3.8   BILITOTAL 0.6 0.6 0.5   ALT 21 30 31   AST 34 55* 57*   ALKPHOS 89 119* 109*      Assessment:   78 year old female with a history of right hemicolectomy in her 40's for colon bleeding who presents with lower GI bleeding found in possibly two areas of the colon transverse colon ? Friable mucosa versus dieulafoy clips x 3, descending " colon clips x 2 likely diverticuli. Ongoing bleeding with negative angiography. Bleeding has persisted and she underwent a colonoscopy with open laparoscopy and underwent a resection of the area where Dr Curiel noted an active bleeding diverticulum. She now seems to have ongoing bleeding intra abdominal. Surgery has seen the patient and is hopeful the bleeding will stop. She was hypotensive over night but blood pressure now 123/57. She is in a lot of pain. Plasma has been given.   GI bleeding from diverticulum now resected. No further GI bleeding noted. Surgery is following for potential intraabdominal bleeding.   Plan  Follow HGB transfuse PRN   GI will sign off,please call with questions/concerns.     Approximately 30 minutes of total time was spent providing patient care, including patient evaluation, reviewing documentation/test result, and .  Kayla Johansen [facility]   Office

## 2024-01-05 NOTE — PLAN OF CARE
Problem: Anemia  Goal: Anemia Symptom Improvement  Outcome: Not Progressing   Pt having large amount of bloody output from RENAE, Dr. Jenkins notified at start of shift.  Pt received 1 unit plasma and calcium gluconate this shift.  Hgb recheck at 1400 6.7, 1 unit of RBC ordered.    Pt having low urine output from bassett, 100 ml this shift.  Dr. Chambers notified.  Nephrology consult ordered.    Cata Sol RN

## 2024-01-05 NOTE — PROGRESS NOTES
Spoke to resident and she will update surgeon; Temp was 96.3 at 2148 ; Pt clammy; Pt has had bear hugger on since at 1930.  Crepitus upper chest and arms.    RENAE total 90ml this shift. PRBC finished at 2120; Labs to be done at 2220.    LR 1000ml total given tonight per two orders.    Torres placed per PVC order at 2050.     Dr. Fernandez at bedside to see pt at around 2150.    EKG and PCXR ordered.

## 2024-01-05 NOTE — PROGRESS NOTES
Patient stated she feels SOB; RR 28; HOB elevated. Oxygen sats 96% on 2 liters. Lungs diminished but clear.     Updated resident. And reported off to oncoming RN Haley.     Labs ordered.

## 2024-01-05 NOTE — PROCEDURES
"PICC Line Insertion Procedure Note    Pt. Name:   Jessica Reynolds  MRN:          [id number]    Procedure:     Insertion of a  DUAL Lumen  5 fr  Bard SOLO (valved) Power PICC, Lot number [id number]    Indications: Vascular Access (POOR); Bld Transfusions; Labs    Contraindications : RUE - Swelling - consistent with PIV infiltration    Procedure Details:    Patient identified with 2 identifiers and \"Time Out\" conducted.    Central line insertion bundle followed: Hand hygiene performed prior to procedure, site cleansed with Cholraprep (CHG), hat, mask, sterile gloves, sterile gown worn, patient draped with maximum barrier head to toe drape, sterile field maintained.    The left upper arm BASILIC vein was assessed by ultrasound and found to be anechoic, compressible, patent, and of adequate size.     Lidocaine 1% 2.5 ml administered SQ to the insertion site.     Modified Seldinger Technique (MST) used for insertion, ONE attempt(s) required to access vein. Imaging during access shows the needle within the vein.     PICC was advanced through peel-away sheath.    Catheter threaded without difficulty. The tip of the catheter was positioned in the SVC. Good blood return noted.    Catheter was flushed with 20 cc normal saline.     Catheter secured with Statlock, tissue adhesive (on insertion site only), Biopatch (CHG), and Tegaderm dressing applied.    The sharps that are included in the PICC insertion kit were accounted for and disposed of in the sharps container prior to breakdown of the sterile field.    CLABSI prevention brochure left at bedside.    Patient  tolerated procedure well.     Patient's primary RN notified PICC is ready for use.      Findings:    Total catheter length  43 cm, with 2 cm exposed. Mid upper arm circumference is 26 cm.     Tip placement verified in the distal SVC by [employer]:        Comments:  None        Rishabh Mora, MSN, RN, VA-BC   Vascular Access - [facility]      "

## 2024-01-05 NOTE — PROGRESS NOTES
Called surgery and notified pt was clammy, nauseated. Given zofran and pain meds.     Later pt was hypotensive. Hgb was 7.3. Surgeon  at bedside and talked to pt. She improved. No signs of rectal bleeding. RENAE output 30ml bloody drainage.    Will transfuse 1 unit PRBC and give 500ml LR per orders. Will monitor BP's closely.

## 2024-01-05 NOTE — SIGNIFICANT EVENT
Significant Event Note    Time of event: 10:10 PM January 4, 2024    Description of event:  Paged regarding low temperature and clammy appearance.    Briefly, patient is a 77yo hx urothelial cancer s/p resection, COPD, HTN, HLD, previous heavy alcohol use, here with BRBPR with acute blood loss anemia. She was found to have bleeding diverticulum and is POD0 from a partial colon resection.     Earlier in the evening, she was hypotensive, responded to 1U RBCs and IVF bolus x2. General surgery aware and came in to evaluate her.     Now, patient with a core temp of 96.3, appears clammy, and has crepitus in the upper chest (per nursing has been there since surgery).    Patient states her abdominal pain has gotten a little better. Otherwise denies shortness of breath, chest pain, dizziness. AO.     On exam, patient has mechelle hugger, appears ill and fatigued. Her lungs are clear bilaterally. Her heart is with regular rate and rhythm. She feels warm in her extremities with palpable pulses. Her abdomen does not appear distended, has tenderness c/w recent surgery, has some bloody output in the drain.     Appears to have about 90cc of output since 3pm.    Plan:  Discussed new findings with surgery. They have a low suspicion for acute intra-abdominal bleeding or infection. State that her output from the drain would be much higher and she would have bright blood per her rectum as well. Agree with CXR for crepitus and getting an EKG. Labs are scheduled for 10:30pm per surgery.    Discussed with: bedside nurse and on-call team, surgery    Vero Fernandez MD    12:37 AM  Notified earlier about patient feeling short of breath. Went to evaluate her. Her lungs sounded clear, no change in oxygen support, she was breathing a little faster but still within normal range. CXR looked clear. EKG normal. Lactic acid came back elevated to 4.3, VBG with normal CO2 and pH. Suspect lactic acid is from recent bowel surgery. Low suspicion for sepsis  at this time. Will repeat in four hours and given hydroxyzine for anxiety.    Discussed plan with hospitalist.

## 2024-01-05 NOTE — PROGRESS NOTES
Care Management Follow Up    Length of Stay (days): 5    Expected Discharge Date: 01/06/2024     Concerns to be Addressed:     medical progression   Patient plan of care discussed at interdisciplinary rounds: Yes    Anticipated Discharge Disposition:       Anticipated Discharge Services:  none   Anticipated Discharge DME:  per treatment team     Patient/family educated on Medicare website which has current facility and service quality ratings:  NA  Education Provided on the Discharge Plan:  yes  Patient/Family in Agreement with the Plan:  yes    Referrals Placed by CM/SW:  none required   Private pay costs discussed: Not applicable    Additional Information:  SW reviewed chart. Anticipate return home when medically appropriate for discharge. CM following care progression to assist as needed with safe discharge planning.     Social History:  Patient lives with her daughter Isabelle and is independent with activities of daily living at baseline. Family to transport at discharge.    SHERRY Menon

## 2024-01-05 NOTE — ANESTHESIA PREPROCEDURE EVALUATION
Anesthesia Pre-Procedure Evaluation    Patient: Jessica Reynolds   MRN: [id number] : 1945        Procedure : Procedure(s):  LAPAROSCOPY, repair versus resection of bleeding diverticulum (Abdomen)           Past Medical History:   Diagnosis Date    Anxiety     Arthritis     Bladder mass     Carotid stenosis     right    COPD (chronic obstructive pulmonary disease) (H)     Essential tremor     Hepatic steatosis     History of rectal cancer     Hypertension     Macular degeneration (senile) of retina     Osteoporosis     Rosacea     Thyroid disease     Vitamin D deficiency       Past Surgical History:   Procedure Laterality Date    APPENDECTOMY      COLONOSCOPY N/A 2024    Procedure: COLONOSCOPY WITH HEMOSTASIS;  Surgeon: Michael Curiel MD;  Location: [facility] OR    COLONOSCOPY N/A 2024    Procedure: COLONOSCOPY;  Surgeon: Michael Curiel MD;  Location: [facility] OR    COMBINED CYSTOSCOPY, RETROGRADES, EXCHANGE STENT URETER(S) Left 3/8/2023    Procedure: CYSTOSCOPY WITH LEFT RETROGRADE PYELOGRAM LEFT URETEROSCOPY, BRUSH AND REGULAR BIOPSY LEFT URETER AND LEFT URETERAL STENT PLACEMENT;  Surgeon: Suresh Samuels MD;  Location: [facility] OR    EYE SURGERY      HERNIORRHAPHY INCISIONAL (LOCATION) N/A 2024    Procedure: INCISIONAL HERNIA REPAIR;  Surgeon: Caden Jenkins MD;  Location: [facility] OR    IR VISCERAL ANGIOGRAM  2024    LAPAROSCOPIC ASSISTED SIGMOID COLECTOMY N/A 2024    Procedure: PARTIAL COLON RESECTION;;  Surgeon: Caden Jenkins MD;  Location: [facility] OR    LAPAROSCOPY DIAGNOSTIC (GYN) N/A 2024    Procedure: LAPAROSCOPIC EXPLORATION; WITH;  Surgeon: Caden Jenkins MD;  Location: [facility] OR    PICC DOUBLE LUMEN PLACEMENT  2024      Allergies   Allergen Reactions    Bactrim [Sulfamethoxazole-Trimethoprim] Nausea and Vomiting and Diarrhea     Critically high      Sudafed [Pseudoephedrine] Dizziness     Light headed      Social History      Tobacco Use    Smoking status: Every Day     Packs/day: .5     Types: Cigarettes    Smokeless tobacco: Not on file    Tobacco comments:     4 cigarettes in the past 2 weeks   Substance Use Topics    Alcohol use: Yes     Alcohol/week: 2.0 standard drinks of alcohol     Types: 2 Standard drinks or equivalent per week     Comment: N/A beer every other day, zaid 3-5 drinks er week      Wt Readings from Last 1 Encounters:   12/31/23 55 kg (121 lb 4.1 oz)        Anesthesia Evaluation   Pt has had prior anesthetic.         ROS/MED HX  ENT/Pulmonary:     (+)                         moderate,  COPD,              Neurologic: Comment: CONCLUSION:   HEAD CT:   1.  Mild presumed chronic small vessel ischemic change.   2.  Mild generalized volume loss.   3.  No hydrocephalus.     HEAD CTA:   1.  Occluded right internal carotid artery. This vessel is reconstituted at the   level of the ophthalmic artery.   2.  Normal anterior and middle cerebral arteries.   3.  Normal posterior circulation.     NECK CTA:   1.  Chronic atheromatous occlusion of the right internal carotid artery.   2.  Normal left carotid system. No left carotid stenosis as per NASCET criteria.   3.  Dominant right vertebral artery.   4.  Aortic origin of the left vertebral artery.    - neg neurologic ROS     Cardiovascular:  - neg cardiovascular ROS   (+)  hypertension- -   -  - -                                      METS/Exercise Tolerance: >4 METS    Hematologic: Comments: Low platelets=89k    (+)      anemia, history of blood transfusion, no previous transfusion reaction,        Musculoskeletal:  - neg musculoskeletal ROS (+)  arthritis,             GI/Hepatic:  - neg GI/hepatic ROS   (+)             liver disease (fatty liver),       Renal/Genitourinary:  - neg Renal ROS   (+) renal disease (CR stable. 0.94), type: CRI, Pt does not require dialysis,           Endo:  - neg endo ROS   (+)          thyroid problem, hypothyroidism,          "  Psychiatric/Substance Use:  - neg psychiatric ROS     Infectious Disease:  - neg infectious disease ROS     Malignancy:  - neg malignancy ROS (+) Malignancy, History of GI.    Other:  - neg other ROS          Physical Exam    Airway  airway exam normal      Mallampati: II       Respiratory Devices and Support         Dental  no notable dental history         Cardiovascular   cardiovascular exam normal       Rhythm and rate: regular and normal     Pulmonary   pulmonary exam normal        breath sounds clear to auscultation           OUTSIDE LABS:  CBC:   Lab Results   Component Value Date    WBC 16.5 (H) 01/05/2024    WBC 10.3 01/04/2024    HGB 6.7 (LL) 01/05/2024    HGB 7.2 (L) 01/05/2024    HCT 22.8 (L) 01/05/2024    HCT 20.4 (L) 01/04/2024     (L) 01/05/2024    PLT 82 (L) 01/04/2024     BMP:   Lab Results   Component Value Date     01/05/2024     01/04/2024    POTASSIUM 4.6 01/05/2024    POTASSIUM 4.5 01/04/2024    CHLORIDE 110 (H) 01/05/2024    CHLORIDE 110 (H) 01/04/2024    CO2 14 (L) 01/05/2024    CO2 18 (L) 01/04/2024    BUN 21.0 01/05/2024    BUN 16.1 01/04/2024    CR 1.39 (H) 01/05/2024    CR 1.13 (H) 01/04/2024     (H) 01/05/2024     (H) 01/05/2024     COAGS:   Lab Results   Component Value Date    PTT 32 12/31/2023    INR 1.10 12/31/2023     POC: No results found for: \"BGM\", \"HCG\", \"HCGS\"  HEPATIC:   Lab Results   Component Value Date    ALBUMIN 3.8 12/31/2023    PROTTOTAL 6.4 12/31/2023    ALT 21 12/31/2023    AST 34 12/31/2023    ALKPHOS 89 12/31/2023    BILITOTAL 0.6 12/31/2023     OTHER:   Lab Results   Component Value Date    LACT 3.5 (H) 01/05/2024    A1C 5.7 (H) 12/31/2023    JERI 7.7 (L) 01/05/2024    MAG 1.7 01/05/2024    TSH 3.74 06/20/2023       Anesthesia Plan    ASA Status:  4, emergent    NPO Status:  NPO Appropriate    Anesthesia Type: General.     - Airway: ETT   Induction: Intravenous, Propofol.   Maintenance: Balanced.   Techniques and Equipment:     - " Lines/Monitors: 2nd IV (possible A Line)     Consents    Anesthesia Plan(s) and associated risks, benefits, and realistic alternatives discussed. Questions answered and patient/representative(s) expressed understanding.     - Discussed: Risks, Benefits and Alternatives for BOTH SEDATION and the PROCEDURE were discussed     - Discussed with:  Patient      - Extended Intubation/Ventilatory Support Discussed: No.      - Patient is DNR/DNI Status: No             Suspend during perioperative period? No.    Use of blood products discussed: Yes.     - Discussed with: Patient.     - Consented: consented to blood products     Postoperative Care    Pain management: IV analgesics, Oral pain medications.   PONV prophylaxis: Ondansetron (or other 5HT-3), Dexamethasone or Solumedrol     Comments:    Other Comments: GAETT  Antiemetics  Plan for maps >75 w/ pressors prn given carotid stenosis. PICC on L arm in place. TS available. Ca being supplemented.   Sherrell prn will have L arm out.      Nothing on Right side for suspected infiltrated PICC.              Oh Falcon MD    I have reviewed the pertinent notes and labs in the chart from the past 30 days and (re)examined the patient.  Any updates or changes from those notes are reflected in this note.

## 2024-01-06 NOTE — PROGRESS NOTES
No active bleeding identified on reexploration this evening.  Patient received 1 additional unit of packed red cells and 1 additional gram of calcium gluconate while in the operating room (this had been started on the floor prior to proceeding to the operating room).  Will cancel all hemoglobin checks for this evening, and would not plan on drawing labs more than once every 24 hours for hemoglobin checks unless change in clinical condition or drain output.  Next lab draw should be a.m. labs for CMP and CBC.    Caden Jenkins MD, FACS  [phone]  [facility]  General and Bariatric Surgery

## 2024-01-06 NOTE — PLAN OF CARE
Problem: Anemia  Goal: Anemia Symptom Improvement  Intervention: Monitor and Manage Anemia  Recent Flowsheet Documentation  Taken 1/6/2024 0030 by Delia Ngo, RN  Safety Promotion/Fall Prevention:  Problem: Adult Inpatient Plan of Care  Goal: Absence of Hospital-Acquired Illness or Injury  Intervention: Prevent and Manage VTE (Venous Thromboembolism) Risk  Recent Flowsheet Documentation  Taken 1/6/2024 0030 by Delia Ngo, RN  VTE Prevention/Management: SCDs (sequential compression devices) on      activity supervised   assistive device/personal items within reach   nonskid shoes/slippers when out of bed   patient and family education   room organization consistent   safety round/check completed   Goal Outcome Evaluation:    Patient noted very lethargic overnight, aroused easily with touch. Respirations ranging 8-12 overnight. Etco2 12-24. Encouraged patient to breath all night long. Right arm noted with edema and weeping moderately,  covered with abdominal and ace dressing, as well as elevated hand. Early this morning patient began screaming loud and verbalizing that she is dying and needed some water, re-assured and assisted her to drink water. Torres patent and draining. RENAE output 30 ml. Will continue monitoring the patient closely.

## 2024-01-06 NOTE — ANESTHESIA PROCEDURE NOTES
Airway         Procedure Start/Stop Times: 1/5/2024 7:00 PM  Staff -        Anesthesiologist:  Oh Falcon MD       CRNA: Delia Tomlinson APRN CRNA       Performed By: CRNAIndications and Patient Condition       Indications for airway management: alessandro-procedural       Induction type:intravenous       Mask difficulty assessment: 1 - vent by mask    Final Airway Details       Final airway type: endotracheal airway       Successful airway: ETT - single  Endotracheal Airway Details        ETT size (mm): 7.0       Cuffed: yes       Successful intubation technique: direct laryngoscopy       DL Blade Type: [place] 2       Grade View of Cords: 1       Adjucts: stylet       Position: Right       Measured from: lips       Secured at (cm): 22       Bite block used: None    Post intubation assessment        Placement verified by: capnometry, equal breath sounds and chest rise        Number of attempts at approach: 1       Secured with: tape       Ease of procedure: easy       Dentition: Intact and Unchanged    Medication(s) Administered   Medication Administration Time: 1/5/2024 7:00 PM

## 2024-01-06 NOTE — ANESTHESIA CARE TRANSFER NOTE
Patient: Jessica Reynolds    Procedure: Procedure(s):  LAPAROSCOPY, HEMATOMA WASHOUT       Diagnosis: Acute blood loss as cause of postoperative anemia [D62]  Diagnosis Additional Information: No value filed.    Anesthesia Type:   General     Note:    Oropharynx: oropharynx clear of all foreign objects  Level of Consciousness: awake  Oxygen Supplementation: face mask  Level of Supplemental Oxygen (L/min / FiO2): 6  Independent Airway: airway patency satisfactory and stable  Dentition: dentition unchanged  Vital Signs Stable: post-procedure vital signs reviewed and stable  Report to RN Given: handoff report given  Patient transferred to: PACU    Handoff Report: Identifed the Patient, Identified the Reponsible Provider, Reviewed the pertinent medical history, Discussed the surgical course, Reviewed Intra-OP anesthesia mangement and issues during anesthesia, Set expectations for post-procedure period and Allowed opportunity for questions and acknowledgement of understanding      Vitals:  Vitals Value Taken Time   /64 01/05/24 2018   Temp     Pulse 82 01/05/24 2019   Resp 13 01/05/24 2019   SpO2 97 % 01/05/24 2019   Vitals shown include unfiled device data.    Electronically Signed By: PRANAV Goetz CRNA  January 5, 2024  8:21 PM

## 2024-01-06 NOTE — OP NOTE
[facility]  Operative Note    Pre-operative diagnosis: Acute blood loss as cause of postoperative anemia [D62]   Post-operative diagnosis Intraabdominal hemorrhage   Procedure: Procedure(s):  LAPAROSCOPY, HEMATOMA WASHOUT   Surgeon: Caden Jenkins MD   Assistants(s): None   Anesthesia: General    Estimated blood loss: 300 ml of old blood, no new bleeding noted   Total IV fluids: (See anesthesia record)   Blood transfusion: No transfusion was given during surgery   Total urine output: (See anesthesia record)   Drains: Charles   Specimens: None   Implants: None   Findings: 310 cc of old clot.  No evidence of active bleeding from along any of the Mesenteric surfaces .   Complications: None.   Condition: Stable       Description of procedure:  The patient was brought to the operating room where after induction of general anesthesia with endotracheal ovation she was positioned with her right arm tucked.  She was then prepped and draped in sterile fashion.  After procedural timeout, we began by cutting through her previous 5 mm trocar sites and inserting 5 mm trocars.  The abdomen was insufflated.  On initial survey of the abdomen, we could see the clot burden along the left paracolic gutter, not extending into the upper abdomen and not extending to the right.  We then proceeded to evacuate the clot, removing in total 310 cc of clot.  Nearly the entirety of the clot was evacuated from the abdomen.  We then proceeded to irrigate the abdomen with 1 L of saline.  We then carefully inspected the cut mesenteric surfaces of the sigmoid and descending colon.  These were all closely washed for several minutes, without identifying any additional sites of oozing or bleeding.  All told the cut edges and then mesentery appeared quite hemostatic at this point in time.  We then evaluated the omentum and portion of the stomach that had been herniated in the epigastric hernia, all of which appeared hemostatic with  no evidence of active bleeding.  We then sat and waited for 5 to 10 minutes giving any occult bleeding a chance to accumulate along the left paracolic gutter, with none occurring.  Given the lack of any visible or the identifiable bleeding vessels or structures, and the lack of accumulation of anything that resembled new or fresh blood, we elected to terminate the case.  The drain was repositioned along the left paracolic gutter.  Insufflation was released and trocars removed.  The skin incisions were then closed with interrupted 4-0 Vicryl sutures.    Caden Jenkins MD, FACS  [phone]  [facility]  General and Bariatric Surgery

## 2024-01-06 NOTE — ANESTHESIA POSTPROCEDURE EVALUATION
Patient: Jessica Reynolds    Procedure: Procedure(s):  LAPAROSCOPY, HEMATOMA WASHOUT       Anesthesia Type:  General    Note:  Disposition: Outpatient   Postop Pain Control: Uneventful            Sign Out: Well controlled pain   PONV: No   Neuro/Psych: Uneventful            Sign Out: Acceptable/Baseline neuro status   Airway/Respiratory: Uneventful            Sign Out: Acceptable/Baseline resp. status   CV/Hemodynamics: Uneventful            Sign Out: Acceptable CV status; No obvious hypovolemia; No obvious fluid overload   Other NRE: NONE   DID A NON-ROUTINE EVENT OCCUR? No           Last vitals:  Vitals Value Taken Time   /67 01/05/24 2100   Temp 36.2  C (97.2  F) 01/05/24 2027   Pulse 81 01/05/24 2101   Resp 10 01/05/24 2101   SpO2 95 % 01/05/24 2101   Vitals shown include unfiled device data.    Electronically Signed By: Oh Falcon MD  January 5, 2024  9:02 PM

## 2024-01-06 NOTE — PLAN OF CARE
Goal Outcome Evaluation:    Problem: Gastrointestinal Bleeding  Goal: Optimal Coping with Acute Illness  Outcome: Not Progressing  Goal: Hemostasis  Outcome: Not Progressing     Problem: Anemia  Goal: Anemia Symptom Improvement  Outcome: Not Progressing  Intervention: Monitor and Manage Anemia  Recent Flowsheet Documentation  Taken 1/5/2024 1600 by Raquel Armijo RN  Safety Promotion/Fall Prevention:   activity supervised   assistive device/personal items within reach   nonskid shoes/slippers when out of bed   patient and family education   room organization consistent   safety round/check completed     Problem: Activity Intolerance  Goal: Enhanced Capacity and Energy  Outcome: Not Progressing     Patient arrived back from [facility] around 2130. Patient has been minimally responsive this whole time. Patient answered questions once but besides that has been slurring words and cannot make eye contact or follow directions. Respirations between 6 and 12. Co2 fluctuating between 11 and 30s. House paged x2 and anesthesia x1. Anesthesia said it was house's responsibility and house said to continue to trend. Narcan pulled but not given at this time. No pain reported. Mildly hypertensive. 92% on 3L. RUE ultrasound done- pending results.     Raquel Armijo, RN

## 2024-01-06 NOTE — PROGRESS NOTES
Surgery paged  at [phone] regarding output of RENAE. RENAE fluid is dark red/brown and smells like stool.     Claire Smart RN

## 2024-01-06 NOTE — PROGRESS NOTES
[facility]    Medicine Progress Note - Hospitalist Service    Date of Admission:  12/31/2023    Assessment & Plan   Jessica Reynolds is a 78 year old female admitted on 12/31/2023. She has history of urothelial cancer status post resection in early 2023, COPD, HTN, HLD, previous heavy alcohol use here for BRBPR     # Painless bright red blood per rectum  -Acute blood loss anemia  -Bleeding sigmoid diverticulum  S/p laparoscopic exploration and partial colon resection 01/04  S/p laparoscopic hematoma washout 01/05  Lactic acidosis  -multiple episodes of bright red blood per rectum since admission  -Hemoglobin downtrending with ongoing bleed, getting another unit of PRBC now  -CTA showing prominent intramural vascularity of entire colon concerning for angiodysplasia, colonic diverticulosis  -Colonoscopy done on 1/2/24-Hemorrhagic mucosa in the transverse colon. Multiple clips placed by GI - 1st in transverse was area of friable mucosa vs dieulafoy--clips x3 and 2nd in descending colon likely actively bleeding diverticulum--clips x2  -ongoing bleeding with symptoms, so IR took and did angio--no coils placed 1/2/24  -received 7U pRBC, also FFP and calcium gluconate due to multiple transfusions  -Patient went for laparoscopic exploration and partial colon resection for bleeding sigmoid diverticulum on 01/04 by surgery  -Underwent laparoscopic hematoma washout 01/05, due to excess bloody output from her surgical drain with drop in hemoglobin  -iv ppi, pain management, IV hydration, clear liquid diet  -Informed by RN that output of RENAE is dark red/brown and smells stool like, Surgery informed  -Trend LA    # Suspected sepsis  # Acute respiratory alkalosis with lethargy and soft blood pressure  # Meets SIRS criteris (2/4) with unknown source  # Lactic acidosis  # Hypoalbuminemia - Suspect third spacing  -IV Dilaudid held due to patient being drowsy and lethargic, had hyperventilation due to  uncontrolled pain, gave IV Dilaudid 0.4 mg, ET CO2 improved to 22 and resting comfortably, pain relieved  -Will start IV Zosyn  -D5 + NS @ 100ml/hr after 1L bolus  Send blood cultures x 2  CXR, UA, Ucx  CBC, BMP, LA  If Hb drops will repeat CT abd  Monitor BP  Pain management  Repeat ABG     J LUIS  Creatinine baseline 0.86, trending up  Likely prerenal due to volume loss  UO monitor, Has Foleys' monitor  On LR at 100 cc/h monitor creatinine    Leukocytosis likely reactive - resolved  Monitor wbc    Thrombocytopenia  Likely secondary to acute blood loss  Not on chemical DVT prophylaxis  Will monitor platelets     COPD  -On room air  -Home inhalers     History of urothelial cancer  -Sees [facility]  -Status post resection of tumor a few months ago  -She states has followup scheduled in April     Prediabetes  HbA1c 5.7     Mood, home Zoloft, Remeron     Hypothyroid, home Synthroid     Tremor, home primidone     HLD, home statin     Hypertension, holding med, lower bp now     Incidental findings  - Cholelithiasis with multiple tiny gallstones, no cholecystitis nor bile duct dilation  - Multiple pancreatic calcifications compatible with chronic pancreatitis  - Supraumbilical hernia, no obstruction    Hypomag  -replete as needed    RUE erythema  RUE superficial thrombophlebitis  Negative for DVT    -- PICC line placed 01/05  -- Patient discussed that she did not want to be brought back if she passed away and wanted her CODE STATUS to be changed to DNR/DNI          Diet: Advance Diet as Tolerated: Clear Liquid Diet    DVT Prophylaxis: Pneumatic Compression Devices  Torres Catheter: PRESENT, indication: Strict 1-2 Hour I&O  Lines: PRESENT      PICC 01/05/24 Double Lumen Left Basilic VAscular Access (POOR); Blood transfusions; Labs-Site Assessment: WDL      Cardiac Monitoring: ACTIVE order. Indication: gib  Code Status: No CPR- Do NOT Intubate      Clinically Significant Risk Factors        # Hyperkalemia: Highest K =  5.6 mmol/L in last 2 days, will monitor as appropriate       # Hypoalbuminemia: Lowest albumin = 1.9 g/dL at 1/6/2024  7:25 AM, will monitor as appropriate   # Thrombocytopenia: Lowest platelets = 78 in last 2 days, will monitor for bleeding  # Acute Kidney Injury, unspecified: based on a >150% or 0.3 mg/dL increase in last creatinine compared to past 90 day average, will monitor renal function  # Hypertension: Noted on problem list                   Disposition Plan      Expected Discharge Date: 01/09/2024    Discharge Delays: Lab Result Pending (enter specific test & time in comments)  Specialist Consult (enter specialist & decision needed in comments)    Discharge Comments: Pending results of colonoscopy            Idalia Chambers MD  Hospitalist Service  [facility]  Securely message with [employer] (more info)  Text page via [employer] Paging/Directory   ______________________________________________________________________    Interval History   Patient was seen at the bedside today, was in acute distress due to severe pain.  Received IV Dilaudid and pain relieved significantly.  Monitoring drain output, RN informed that was dark red/brown with stool order, surgery paged.  -Patient discussed that she wanted to be DNR/DNI, CODE STATUS changed  -Spoke with daughter Isabelle and updated ongoing clinical course, answered all questions    Physical Exam   Vital Signs: Temp: 97.7  F (36.5  C) Temp src: Oral BP: 130/57 Pulse: 87   Resp: 16 SpO2: 91 % O2 Device: Nasal cannula Oxygen Delivery: 3 LPM  Weight: 121 lbs 4.05 oz    Constitutional: looks very weak and lying in the bed, acute distress due to pain  Respiratory: increased work of breathing, later improved, good air exchange and diminished breath sounds right base and left base  Cardiovascular: regular rate and rhythm and normal S1 and S2  GI: soft, non distended, tenderness c/w post op, drain with bloody output  Skin: no bruising or  bleeding  Musculoskeletal: no lower extremity pitting edema present  Neurologic: Mental Status Exam:  Level of Alertness:   awake  Neuropsychiatric: General: normal, calm, and normal eye contact  Affect: flat    Medical Decision Making       45 MINUTES SPENT BY ME on the date of service doing chart review, history, exam, documentation & further activities per the note.      Data     I have personally reviewed the following data over the past 24 hrs:    8.4  \   7.5 (L)   / 78 (L)     137 111 (H) 28.6 (H) /  150 (H)   4.6 19 (L) 1.78 (H) \     ALT: 9 AST: 53 (H) AP: 46 TBILI: 0.6   ALB: 1.9 (L) TOT PROTEIN: 3.6 (L) LIPASE: N/A     Procal: N/A CRP: N/A Lactic Acid: 2.9 (H)         Imaging results reviewed over the past 24 hrs:   Recent Results (from the past 24 hour(s))   US Upper Extremity Venous Duplex Right    Narrative    EXAM: US UPPER EXTREMITY VENOUS DUPLEX RIGHT  LOCATION: [facility]  DATE: 1/5/2024    INDICATION: Swelling and erythema, r/o DVT.  COMPARISON: None.  TECHNIQUE: Venous Duplex ultrasound of the right upper extremity with (when possible) and without compression, augmentation, and duplex. Color flow and spectral Doppler with waveform analysis performed.    FINDINGS: Ultrasound includes evaluation of the internal jugular vein, innominate vein, subclavian vein, axillary vein, and brachial vein. The superficial cephalic and basilic veins were also evaluated where seen.     RIGHT: No deep venous thrombosis. Superficial thrombophlebitis in the cephalic vein from the distal upper arm through the wrist. Additionally, one of the branches of the basilic vein contains thrombus at the antecubital fossa.      Impression    IMPRESSION:  1.  No deep venous thrombosis in the right upper extremity.    2.  Acute superficial thrombophlebitis as described above.

## 2024-01-06 NOTE — SIGNIFICANT EVENT
Upon assessment of patient, It was clear that there was no other PIV options if patients current PIV infiltrated. Orders for 2 access sites. Dr. Chambers was paged and asked about pt getting a PICC line. Dr. Chambers agreed and this was placed. Pt's RUE is extremely swollen and Dr. Chambers was notified and US was ordered to check for DVT. This was put on hold however as patient was having lots of bloody drainage from RENAE drain. RN spoke with Dr. Jenkins and it was decided that patient was going to be taken back to surgery to figure out where the bleeding was coming from and to stop it. Unit of blood finished around 1730.  Labs were obtained and calcium gluconate bump was given prior to patient going to OR. Patient left for surgery around 1800.

## 2024-01-06 NOTE — PLAN OF CARE
Goal Outcome Evaluation:      Plan of Care Reviewed With: patient      Problem: Adult Inpatient Plan of Care  Goal: Optimal Comfort and Wellbeing  Outcome: Progressing  Intervention: Monitor Pain and Promote Comfort  Recent Flowsheet Documentation  Taken 1/6/2024 1221 by Claire Smart RN  Pain Management Interventions: medication (see MAR)  Pt. Complaining of abdominal pain. Scheduled tylenol given, then low dose of !V dilaudid given. Pt. Continued to have pain, higher dose of IV dilaudid given to help relieve pain.   Capnography was consistently below 20 over night and continuing into the morning. MD informed. Lactic acid and ABGs ordered. Pt. Respirations tachypnea.  After pain medications given, respirations better and capnography above 20.  Pt. More lethargic this afternoon.     Pt. Declined getting up out of bed.  Pt. Repositioned while in bed. Pt. Drank some water this morning and declined all liquids this afternoon.     Claire Smart, RN

## 2024-01-06 NOTE — PROGRESS NOTES
General Surgery Progress Note:    Hospital Day # 6    ASSESSMENT:  1. Acute blood loss as cause of postoperative anemia    2. BRBPR (bright red blood per rectum)        Jessica Reynolds is a 78 year old female who is s/p laparoscopic exploration and partial colon resection for bleeding sigmoid diverticulum on 1/4/24 followed by laparoscopic re-exploration and evacuation of 310cc of old clot and no evidence of new bleeding on 1/5/24.  Patient doing well postoperatively.  Hemoglobin remains stable and no other objective evidence to suggest bleeding.  Okay to continue clear liquid diet for now.  Awaiting return of bowel function.    PLAN:  -Clear liquid diet  -Increase activity and encourage ambulation as able  -Recheck hemoglobin tomorrow a.m.  -Monitor surgical drain output and please notify surgical team if change in output character  -Medical management per primary team    SUBJECTIVE:   She is feeling tired and fatigued.  Endorses abdominal discomfort.  Denies fever, chills, nausea, vomiting.  Tolerating clear liquid diet without issues.  Reports passing gas this morning and a bowel movement.  Per discussion with nursing, patient has not had a bowel movement so we will continue to monitor.  She has not been out of bed yet this morning.  Torres catheter in place.      Patient Vitals for the past 24 hrs:   BP Temp Temp src Pulse Resp SpO2   01/06/24 1102 130/57 97.7  F (36.5  C) Oral 87 16 91 %   01/06/24 0737 124/57 97.9  F (36.6  C) Oral 82 14 96 %   01/06/24 0332 121/59 98.2  F (36.8  C) Oral 82 14 98 %   01/06/24 0045 121/58 97.4  F (36.3  C) Oral 84 10 96 %   01/05/24 2329 -- -- -- -- 12 --   01/05/24 2320 -- -- -- -- (!) 7 --   01/05/24 2312 -- -- -- -- (!) 6 --   01/05/24 2311 -- -- -- -- (!) 9 --   01/05/24 2304 -- -- -- -- (!) 8 --   01/05/24 2208 (!) 141/65 97.3  F (36.3  C) Axillary 86 12 91 %   01/05/24 2131 131/64 97.3  F (36.3  C) Axillary 81 (!) 9 92 %   01/05/24 2115 (!) 162/67 -- -- 83 (!) 9 93 %    01/05/24 2100 (!) 158/67 -- -- 81 10 95 %   01/05/24 2045 (!) 140/63 -- -- 80 11 98 %   01/05/24 2027 139/65 97.2  F (36.2  C) Temporal 81 12 97 %   01/05/24 2024 (!) 152/64 -- -- 82 12 97 %   01/05/24 1722 139/63 97.3  F (36.3  C) Axillary 81 22 95 %   01/05/24 1656 127/61 97.3  F (36.3  C) Axillary 85 20 95 %   01/05/24 1553 138/64 97.3  F (36.3  C) Oral 88 20 96 %   01/05/24 1513 128/59 97  F (36.1  C) Axillary 84 22 96 %   01/05/24 1454 132/61 97  F (36.1  C) Axillary 81 22 96 %         PHYSICAL EXAM:  General: patient seen resting in bed, no acute distress  Resp: no respiratory distress, breathing comfortably on 3 L via nasal cannula  Abdomen: Soft, mildly tender to palpation over incisions, nondistended.  No rebound or guarding.  Surgical incisions clean/dry/intact with dressings in place.  Drains: Surgical drain x 1 with scant, thin dark/sanguinous output.  Output by Drain (mL) 01/04/24 0700 - 01/04/24 1459 01/04/24 1500 - 01/04/24 2259 01/04/24 2300 - 01/05/24 0659 01/05/24 0700 - 01/05/24 1459 01/05/24 1500 - 01/05/24 2259 01/05/24 2300 - 01/06/24 0659 01/06/24 0700 - 01/06/24 1222   Closed/Suction Drain 1 Medial RLQ Bulb 19 Egyptian 75 90 320 200 135 10 75   : Torres catheter in place.  Extremities: warm and well perfused    01/05 0700 - 01/06 0659  In: 1625 [I.V.:350]  Out: 1100 [Urine:445; Drains:345]    No results displayed because visit has over 200 results.           Ghislaine Morgan PA-C  [facility] General Surgery  [facility]  [address] [place] [id number]

## 2024-01-06 NOTE — PROGRESS NOTES
- Was informed by RN that patient is lethargic, tachypenic and BP 95/51  Patient is here for sigmoid diverticular bleeding, with acute blood loss anemia, s/p multiple clips by GI, angio by IR, unsuccessful  S/p Partial colon resection 01/04 and s/p laparoscopic hematoma washout 01/05 due to active ongoing bleeding from the RENAE drain  -so far received 7u pRBC and 1u FFP and calcium gluconate    On exam, patient found to be lethargic, responding to simple questions  HR 94bpm, BP 94/59, RR 28    General; lethargic  Lungs: clear b/l, no wheeze/crackles, tachypneic  Abd: soft, tenderness c/w post op, no guarding, RENAE drain with brown fecal odor output  Ext: b/l extremities edematous    LA 2.9  Prior ABG: pH 7.42, pCO2 26  Hb 7.5    # Suspected sepsis  # Acute metabolic acidosis with compensation with lethargy and soft blood pressure  # Meets SIRS criteris (2/4) with unknown source  # Lactic acidosis  # Hypoalbuminemia - Suspect third spacing  Will start IV Zosyn  D5 + NS @ 100ml/hr after 1L bolus  Send blood cultures x 2  CXR, UA, Ucx  CBC, BMP, LA  If Hb drops will repeat CT abd  Monitor BP  Pain management  Repeat ABG  - Low suspicion for PE, will not do CTA due to worsening J LUIS  Also patient cannot be treated with anticoagulation due to ongoing GI bleed  - Discussed with ICU, transferred to ICU for higher level of care  - Spoke to patient and daughter, Isabelle, confirmed that patient is DNR-DNI as per her living will and always talked about it  - Will monitor

## 2024-01-07 NOTE — CONSULTS
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   Presents to ED on 12/31 with painless bright red blood per rectum. Patient was afebrile and hemodynamically stable. Significant dropped in H/H from 12.6 to 6.4. S/p 4 units of pRBC's. Patient underwent colonoscopy on 1/2/24, two areas of potential bleeding, hemorrhagic mucosa in transverse colon and diverticula in descending colon with active bleeding, noted significant diverticulosis in transverse, descending and sigmoid colon. Patient continued to have low GI bleed, IR consulted, mesenteric angiogram on 1/2 showed no identified site of bleeding. Persistent dropping H/H and intermittent RBC transfusion. Evaluated by surgery and underwent descending colon and partial sigmoid colon resection on 1/4/2024.   Patient was taken back to surgery on 1/5/2024 for evaluation of hypotension and bloody drain output. 310 ml of old intra-abdominal clot was removed, no evidence of active bleeding along mesenteric surfaces.   During the course of day 1/6/2024, patient had increase O2 requirements, complaining of abdominal pain, received intermittent pain meds. Patient become hypotensive, lethargic, raising lactic acid, compensated metabolic acidosis. Started on broad Abx and IV fluids.   Code status was changed to DNR/DNI per patient request.   ICU service consulted for further evaluation.     Acute respiratory failure   Shock   Low GI bleed  Severe anemia   S/p laparoscopic descending colon and partial sigmoid colon resection on 1/4/2024.   S/p laparotomy , evacuation of intra-abdominal hematoma, no evidence of active bleeding along mesenteric surfaces on 1/5/2024.   Acute metabolic acidosis  Lactic acidosis   Acute kidney injury   Pancytopenia    Encephalopathy   Hx HTN  Hx COPD  Hx hypothyroidism     Advance Directives:  DNR/DNI    Plan:   Transfer patient to ICU  Titrate FiO2 to keep SpO2 > 90%, change nasal cannula to HFNC  Code status was discussed with pt and family at bedside, DNR/DNI.  Patient is not a candidate to BiPAP  Schedule bronchodilators   Change IV fluids to bicarb drip   Pressors to keep MAP > 65, start NE, add vasopressin drip as needed  Stress dose steroids   Serial H/H  Check INR, fibrinogen level   RBC transfusion as needed  Calcium gluconate IV x 1 time  Follow up lactic acid  Blood , urine cultures   Abdominal drain fluid for culture   Broad Abx zosyn, vancomycin and micafungin.  Neutropenic precautions  Plan for follow up abdomen CT scan when stable    Monitor kidney function   Supplement electrolytes as needed  NG tube placement and LIS  NPO  PPI IV  Glucose level monitoring   Check TSH level   IV synthroid   Glucose level monitoring   Minimize pain meds and sedatives  Discontinue psy meds  DVT prophylaxis SCDs    Please contact me if you have any questions.  Total critical care time, not including separately billable procedure time: 45 minutes  This patient had a high probability of imminent or life threatening deterioration due to acute respiratory failure which required my direct attention, intervention and personal management.     Jerardo Alvarez  Pulmonary / Critical Care  01/06/2024  7:21 PM          ICU DAILY CHECKLIST                           Can patient transfer out of MICU? yes    FAST HUG:    Feeding:  Feeding: no.  Patient is receiving NPO    Torres: yes  Analgesia/Sedation:no    Thromboembolic prophylaxis: Yes; Mode:  SCDs  HOB>30:  Yes  Stress Ulcer Protocol Active: Yes; Mode: PPI  Glycemic Control: Any glucose > 180 no; Mode of Insulin Therapy: Sliding Scale Insulin    INTUBATED:  Can patient have daily waking:  No  Can patient have spontaneous breathing trial:  No    Restraints? 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   Presents to ED on 12/31 with painless bright red blood per rectum. Patient was afebrile and hemodynamically stable. Significant dropped in H/H from 12.6 to 6.4. S/p 4 units of pRBC's. Patient underwent colonoscopy on 1/2/24, two areas of potential bleeding, hemorrhagic mucosa in transverse colon and diverticula in descending colon with active bleeding, noted diverticulosis in transverse, descending and sigmoid colon. Patient continued to have low GI bleed, IR consulted and patient underwent a mesenteric angiogram with no identified site of bleeding. Persistent dropping H/H and intermittent RBC transfusion. Evaluated by surgery and underwent descending colon and partial sigmoid colon resection on 1/4/2024.   Patient was taken back to surgery on 1/5/2024 for evaluation of hypotension and bloody drain output. 310 ml of old clot was removed, no evidence of active bleeding along mesenteric surfaces.   During the course of day 1/6/2024, patient had increase O2 requirements, complaining of abdominal pain, received intermittent pain meds. Patient become hypotensive, lethargic, raising lactic acid, compensated metabolic acidosis. Started on broad Abx and IV fluids.   Code status was changed to DNR/DNI. ICU service consulted for further evaluation.     Past Medical History:   Diagnosis Date    Anxiety     Arthritis     Bladder mass     Carotid stenosis     right    COPD (chronic obstructive pulmonary disease) (H)     Essential tremor     Hepatic steatosis     History of rectal cancer     Hypertension     Macular degeneration (senile) of retina     Osteoporosis     Rosacea     Thyroid disease     Vitamin D deficiency      Allergies:  Bactrim [sulfamethoxazole-trimethoprim] and Sudafed [pseudoephedrine]     MEDS:  Current Facility-Administered Medications   Medication    acetaminophen (TYLENOL) tablet 650 mg    Or    acetaminophen (TYLENOL) Suppository 650 mg    albuterol (PROVENTIL) neb solution 2.5 mg    Continuing statin from home medication list OR statin order already placed during this visit    glucose gel 15-30 g    Or    dextrose 50 % injection 25-50 mL    Or    glucagon injection 1 mg    Hold: Metformin and metformin containing medications on day of the procedure and for 48 hours after IV contrast given- Patients with acute kidney injury or severe chronic kidney disease (stage IV or stage V; i.e., eGFR less than 30)    hydrocortisone sodium succinate PF (solu-CORTEF) injection 100 mg    HYDROmorphone (DILAUDID) injection 0.1 mg    Or    HYDROmorphone (PF) (DILAUDID) injection 0.3 mg    levothyroxine (SYNTHROID) injection 50 mcg    lidocaine (LMX4) cream    lidocaine (LMX4) cream    lidocaine (LMX4) cream    lidocaine 1 % 0.1-1 mL    lidocaine 1 % 0.1-1 mL    lidocaine 1 % 0.1-5 mL    melatonin tablet 1 mg    micafungin (MYCAMINE) 100 mg in sodium chloride 0.9 % 100 mL intermittent infusion    naloxone (NARCAN) injection 0.2 mg    naloxone (NARCAN) injection 0.2 mg    naloxone (NARCAN) injection 0.4 mg    naloxone (NARCAN) injection 0.4 mg    nicotine (NICORETTE) gum 2 mg    norepinephrine (LEVOPHED) 4 mg in  mL infusion PREMIX    ondansetron (ZOFRAN ODT) ODT tab 4 mg    Or    ondansetron (ZOFRAN) injection 4 mg    oxyCODONE IR (ROXICODONE) half-tab 2.5 mg    Or    oxyCODONE (ROXICODONE) tablet 5 mg    pantoprazole (PROTONIX) IV push injection 40 mg    piperacillin-tazobactam (ZOSYN) 3.375 g vial to attach to  mL bag    prochlorperazine (COMPAZINE) injection 5 mg    Or    prochlorperazine (COMPAZINE) tablet 5 mg    senna-docusate (SENOKOT-S/PERICOLACE) 8.6-50 MG per tablet 1 tablet    Or    senna-docusate  (SENOKOT-S/PERICOLACE) 8.6-50 MG per tablet 2 tablet    sodium bicarbonate 150 mEq in D5W 1,000 mL infusion    sodium chloride (PF) 0.9% PF flush 10-40 mL    sodium chloride (PF) 0.9% PF flush 3 mL    sodium chloride (PF) 0.9% PF flush 3 mL    sodium chloride (PF) 0.9% PF flush 3 mL    sodium chloride (PF) 0.9% PF flush 3 mL    sodium chloride 0.9% BOLUS 1-250 mL    vancomycin (VANCOCIN) 1,000 mg in 200 mL dextrose intermittent infusion    Followed by    [START ON 1/7/2024] vancomycin (VANCOCIN) 750 mg in sodium chloride 0.9 % 250 mL intermittent infusion    vasopressin (VASOSTRICT) 20 Units in sodium chloride 0.9 % 100 mL standard conc infusion     Social History     Socioeconomic History    Marital status:      Spouse name: Not on file    Number of children: Not on file    Years of education: Not on file    Highest education level: Not on file   Occupational History    Not on file   Tobacco Use    Smoking status: Every Day     Packs/day: .5     Types: Cigarettes    Smokeless tobacco: Not on file    Tobacco comments:     4 cigarettes in the past 2 weeks   Substance and Sexual Activity    Alcohol use: Yes     Alcohol/week: 2.0 standard drinks of alcohol     Types: 2 Standard drinks or equivalent per week     Comment: N/A beer every other day, zaid 3-5 drinks er week    Drug use: No    Sexual activity: Not on file   Other Topics Concern    Not on file   Social History Narrative    Not on file     Social Determinants of Health     Financial Resource Strain: Not on file   Food Insecurity: Not on file   Transportation Needs: Not on file   Physical Activity: Not on file   Stress: Not on file   Social Connections: Not on file   Interpersonal Safety: Not on file   Housing Stability: Not on file     Family History   Problem Relation Age of Onset    Cerebrovascular Disease Mother     Heart Disease Father     Cerebrovascular Disease Father     Lung Cancer Sister     Diabetes Maternal Grandmother     Substance Abuse  "Sister      ROS  - Limited history obtained from patient, due to lethargy.         Objective:   VITALS:  BP (!) 85/42   Pulse 88   Temp 97.6  F (36.4  C) (Oral)   Resp 24   Ht 1.575 m (5' 2\")   Wt 55 kg (121 lb 4.1 oz)   SpO2 91%   BMI 22.18 kg/m    VENT:  Resp: 24    EXAM:   Gen: lethargic, arousable, moderate respiratory and general distress  HEENT: pale conjunctiva, moist mucosa  Neck: no thyromegaly, masses or JVD  Lungs: ronchi both HT  CV: regular, no murmurs or gallops appreciated  Abdomen: soft, distended, mild tenderness, no rebound, BS decrease in frequency, drain in placed, foul smelling dark output   Ext: edema 1+  Neuro: lethargic, arousable, non focal       Data Review:  Recent Labs   Lab 01/06/24  1723 01/06/24  1649 01/06/24  1008 01/06/24  0725 01/06/24  0610 01/06/24  0218   GLC 80 76 150* 149* 147* 156*      01/06/24 11:18 01/06/24 17:23   Sodium  133 (L)   Potassium  3.6   Chloride  109 (H)   Carbon Dioxide (CO2)  16 (L)   Urea Nitrogen  26.9 (H)   Creatinine  1.60 (H)   GFR Estimate  32 (L)   Calcium  7.5 (L)   Anion Gap  8   Glucose  80   Lactic Acid 2.9 (H) 3.0 (H)   N-Terminal Pro BNP   1,902 (H)      01/06/24 17:23   WBC 1.6 (L)   Hemoglobin 7.2 (L)   Hematocrit 21.3 (L)   Platelet Count 63 (L)   RBC Count 2.38 (L)   MCV 90   MCH 30.3   MCHC 33.8   RDW 17.9 (H)      01/06/24 12:03   pH Arterial 7.42   pCO2 Arterial 26 (L)   PO2 Arterial 60 (L)   Bicarbonate Arterial 17 (L)   Base Excess Art -7.9   Oxyhemoglobin 92.2 (L)      01/06/24 17:42   Color Urine Yellow   Appearance Urine Clear   Glucose Urine Negative   Bilirubin Urine Negative   Ketones Urine Trace !   Specific Gravity Urine 1.022   pH Urine 5.5   Protein Albumin Urine 20 !   Urobilinogen mg/dL <2.0   Nitrite Urine Negative   Blood Urine Negative   Leukocyte Esterase Urine 75 Jennifer/uL !   WBC Urine 27 (H)   RBC Urine 3 (H)   Yeast Urine Moderate !   Mucus Urine Present !   Hyaline Casts 13 (H)       CXR: pulmonary " congestion     CTA ABDOMEN PELVIS WITH CONTRAST  LOCATION: [facility]  DATE: 12/31/2023  INDICATION: GI bleed with dark and bloody stools.  COMPARISON: CT 01/20/2023  FINDINGS:  ANGIOGRAM ABDOMEN/PELVIS: No evidence of active GI bleed. Prominent intramural vascularity involving segments of the ascending, transverse and descending colon with early draining veins, suggesting angiodysplasia.  The celiac, SMA and single left renal artery are widely patent. Moderate origin stenoses of single right renal artery and ELOISA. Moderate aortoiliac atherosclerosis without aneurysm.  LOWER CHEST: Heavy mitral annular calcifications.  HEPATOBILIARY: Cholelithiasis with multiple tiny gallstones. No cholecystitis nor bile duct dilatation. Negative liver.  PANCREAS: Multiple parenchymal and intraductal stones region of pancreatic head redemonstrated. Largest stone within the distal pancreatic duct measures 5 x 10 mm coronal image 43 series 11. Pancreatic duct is nondilated with no peripancreatic   inflammatory change.  SPLEEN: Normal.  ADRENAL GLANDS: Normal.  KIDNEYS/BLADDER: Mild left renal cortical atrophy. No urinary tract calculus nor hydronephrosis. Mild bladder mucosal hyperenhancement with perivesicular edema/inflammation.  BOWEL: No evidence of active GI bleed Diverticulosis of the colon. No acute inflammatory change. No obstruction.. Previous appendectomy.  LYMPH NODES: No lymphadenopathy.  PELVIC ORGANS: Hysterectomy.  MUSCULOSKELETAL: Interval increase in right paramidline supraumbilical ventral hernia which now contains mesenteric fat and short segment of sigmoid colon without obstruction. No suspicious osseous lesions.  IMPRESSION:  1.  No evidence of active GI bleed.  2.  Prominent intramural vascularity with early draining veins involving segments of the ascending, transverse and descending colon, suggesting angiodysplasia.  3.  Colonic diverticulosis with no acute inflammatory bowel process nor  evidence of obstruction.  4.  Cholelithiasis with multiple tiny gallstones. No cholecystitis nor bile duct dilatation.  5.  Multiple parenchymal and intraductal pancreatic calcifications compatible with sequela from chronic pancreatitis.  6.  Interval enlargement of right paramidline supraumbilical hernia which now contains mesenteric fat and a short segment of transverse colon without obstruction.      By:  Jerardo Alvarez MD, 01/06/2024  7:21 PM    Primary Care Physician:  Shaylee Jacques

## 2024-01-07 NOTE — PROGRESS NOTES
ICU NOTE    Patient was transition to comfort measures.     ICU team will sign off    Jerardo Alvarez MD  Critical Care Medicine  01/07/24  8:05 AM

## 2024-01-07 NOTE — PROGRESS NOTES
General Surgery Progress Note:    Hospital Day # 7    ASSESSMENT:  1. Acute blood loss as cause of postoperative anemia    2. BRBPR (bright red blood per rectum)        Jessica Reynolds is a 78 year old female who is s/p laparoscopic exploration and partial colon resection for bleeding sigmoid diverticulum on 1/4/24 followed by laparoscopic re-exploration and evacuation of 310cc of old clot and no evidence of new bleeding on 1/5/24. Patient and increasing O2 requirement throughout the afternoon yesterday and was noted to have hypotension and was lethargic. Labs obtained and notable for increasing lactic acid and metabolic acidosis. She was started on IV antibiotics and IV fluids and subsequently transferred to the ICU for acute respiratory failure and shock. Code status was changed to DNR/DNI per patient request. Early this morning, decision was made to transition to comfort measures only.     PLAN:  - Comfort measures only initiated  - General surgery will sign off.    SUBJECTIVE:   She is resting comfortably with family at bedside.      Patient Vitals for the past 24 hrs:   BP Temp Temp src Pulse Resp SpO2   01/07/24 0800 101/56 98  F (36.7  C) Axillary -- -- --   01/07/24 0645 -- -- -- 88 -- (!) 81 %   01/07/24 0630 101/56 -- -- 94 -- (!) 79 %   01/07/24 0500 91/55 -- -- 86 16 92 %   01/07/24 0445 93/51 -- -- 86 16 92 %   01/07/24 0430 93/50 -- -- 86 17 97 %   01/07/24 0415 100/50 -- -- 88 19 96 %   01/07/24 0400 109/57 98  F (36.7  C) Axillary 92 25 95 %   01/07/24 0352 -- -- -- -- -- (!) 89 %   01/07/24 0345 121/58 -- -- 95 29 92 %   01/07/24 0330 90/54 -- -- 89 23 92 %   01/07/24 0325 -- -- -- -- -- 93 %   01/07/24 0315 94/50 -- -- 89 22 93 %   01/07/24 0300 105/57 98  F (36.7  C) -- 91 23 93 %   01/07/24 0245 105/59 -- -- 92 22 92 %   01/07/24 0230 106/56 -- -- 89 21 94 %   01/07/24 0215 106/59 -- -- 90 20 92 %   01/07/24 0200 103/55 98  F (36.7  C) -- 90 18 93 %   01/07/24 0145 97/54 -- -- 88 18 93 %    01/07/24 0130 99/57 -- -- 87 17 94 %   01/07/24 0115 95/52 -- -- 88 18 94 %   01/07/24 0100 106/60 -- -- 91 26 93 %   01/07/24 0053 95/53 98  F (36.7  C) -- 90 21 97 %   01/07/24 0045 95/53 -- -- 91 22 93 %   01/07/24 0030 98/50 -- -- 97 25 96 %   01/07/24 0015 108/54 -- -- 96 23 93 %   01/07/24 0000 90/52 98  F (36.7  C) Oral 93 24 95 %   01/06/24 2352 137/62 98.3  F (36.8  C) -- 91 20 --   01/06/24 2345 114/56 -- -- 93 23 92 %   01/06/24 2330 94/54 -- -- 87 20 95 %   01/06/24 2315 (!) 88/51 -- -- 86 18 95 %   01/06/24 2300 111/55 -- -- 93 24 94 %   01/06/24 2245 114/56 -- -- 94 28 95 %   01/06/24 2230 107/52 -- -- 92 23 96 %   01/06/24 2215 104/50 -- -- 91 27 96 %   01/06/24 2200 99/55 -- -- 91 26 96 %   01/06/24 2145 94/50 -- -- 93 26 96 %   01/06/24 2130 92/52 -- -- 88 25 98 %   01/06/24 2123 -- -- -- 90 24 98 %   01/06/24 2115 99/49 -- -- 92 26 98 %   01/06/24 2100 92/53 -- -- 91 25 98 %   01/06/24 2045 (!) 85/53 -- -- 95 28 96 %   01/06/24 2030 91/51 -- -- 88 24 97 %   01/06/24 2015 (!) 83/44 -- -- 93 26 95 %   01/06/24 2014 -- -- -- 96 26 96 %   01/06/24 1908 (!) 85/42 -- -- -- -- --   01/06/24 1902 (!) 80/48 -- -- 88 -- --   01/06/24 1815 95/50 -- -- -- 24 --   01/06/24 1800 90/45 -- -- 88 -- --   01/06/24 1743 95/51 -- -- 88 22 --   01/06/24 1626 95/51 -- -- 89 -- --   01/06/24 1546 92/48 97.6  F (36.4  C) Oral 91 17 91 %         PHYSICAL EXAM:  General: patient seen resting in bed, no acute distress  Resp: no respiratory distress  Abdomen: Not examined  Extremities: warm and well perfused    01/06 0700 - 01/07 0659  In: 2259.91 [P.O.:100; I.V.:1529.91]  Out: 1435 [Urine:825; Drains:590]    No results displayed because visit has over 200 results.           Ghislaine Morgan PA-C  [facility] General Surgery  [address]  [address]  [place] [address]

## 2024-01-07 NOTE — DEATH PRONOUNCEMENT
MD DEATH PRONOUNCEMENT    Called to pronounce Jessica A Reynolds dead.    Physical Exam: Unresponsive to noxious stimuli, Spontaneous respirations absent, Breath sounds absent, and Heart sounds absent    Patient was pronounced dead at 12:25 PM, January 7, 2024.    Preliminary Cause of Death: Hypoxic respiratory failure secondary to septic shock

## 2024-01-07 NOTE — DISCHARGE SUMMARY
[facility]    Hospitalist Discharge Summary       Date of Admission:  12/31/2023  Date of Discharge:  01/07/2023  Discharging Provider: Idalia Chambers MD      Discharge Diagnoses   - Acute hypoxic respiratory failure  - Septic shock  - Acute blood loss anemia  - Acute metabolic acidosis  - Lactic acidosis  - Encephalopathy  - Pancytopenia  - Sigmoid Diverticular bleeding  - S/p laparoscopic descending colon and partial sigmoid colon resection  - S/p laparotomy , evacuation of intra-abdominal hematoma, no evidence of active bleeding along mesenteric surfaces  - J LUIS  - hypertension  - COPD  - Hypothyroidism  - H/o urothelial cancer    Follow-ups Needed After Discharge     Unresulted Labs Ordered in the Past 30 Days of this Admission       Date and Time Order Name Status Description    1/6/2024  8:05 PM Body fluid, unsp Aerobic Bacterial Culture Routine With Gram Stain Preliminary     1/6/2024  6:14 PM Urine Culture In process     1/6/2024  5:51 PM Blood Culture Line, venous Preliminary             Hospital Course   Jessica Reynolds is a 78 year old female with history of HTN, COPD, tobacco user, hypothyroidism, 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  Presents to ED on 12/31 with painless bright red blood per rectum. Patient was afebrile and hemodynamically stable. Significant dropped in H/H from 12.6 to 6.4. S/p 4 units of pRBC's. Patient underwent colonoscopy on 1/2/24, two areas of potential bleeding, hemorrhagic mucosa in transverse colon and diverticula in descending colon with active bleeding, noted significant diverticulosis in transverse, descending and sigmoid colon. Patient continued to have low GI bleed, IR consulted, mesenteric angiogram on 1/2 showed no identified site of bleeding. Persistent dropping H/H and intermittent RBC transfusion. Evaluated by surgery and underwent descending colon and partial sigmoid colon resection on 1/4/2024.    Patient was taken back to surgery on 1/5/2024 for evaluation of hypotension and bloody drain output. 310 ml of old intra-abdominal clot was removed, no evidence of active bleeding along mesenteric surfaces.   During the course of day 1/6/2024, patient had increase O2 requirements, complaining of abdominal pain, received intermittent pain meds. Patient become hypotensive, lethargic, raising lactic acid, compensated metabolic acidosis. Started on broad Abx and IV fluids. Code status was changed to DNR/DNI per patient request and confirmed with daughter, Isabelle. Transferred to ICU for higher level of care, was on HFNC for WOB and broad spectrum antibiotics.   Pressors for septic shock, progressive J LUIS.   -- Patient continued to worsen with increased O2 and pressor requirements, with uptrending lactic acid and decreased urine output.  Family decided to change acute care to comfort measures. Patient pronounced dead at 12.25 PM on 01/07/2024     - Acute hypoxic respiratory failure  - Septic shock  - Acute blood loss anemia  - Acute metabolic acidosis  - Lactic acidosis  - Encephalopathy  - Pancytopenia  - Sigmoid Diverticular bleeding  - S/p laparoscopic descending colon and partial sigmoid colon resection  - S/p laparotomy , evacuation of intra-abdominal hematoma, no evidence of active bleeding along mesenteric surfaces  - J LUIS  - hypertension  - COPD  - Hypothyroidism  - H/o urothelial cancer    Consultations This Hospital Stay   GASTROENTEROLOGY IP CONSULT  INTERVENTIONAL RADIOLOGY ADULT/PEDS IP CONSULT  SURGERY GENERAL IP CONSULT  NEPHROLOGY IP CONSULT  VASCULAR ACCESS ADULT IP CONSULT  PHARMACY TO DOSE [facility] IP CONSULT  [facility] SERVICES IP CONSULT    Code Status   No CPR- Do NOT Intubate    Time Spent on this Encounter   I,Idalia Chambers, personally saw the patient today and spent approximately 25 minutes discharging this patient.       Idalia Chambers MD  [facility]  [facility]  ______________________________________________________________________    Physical Exam   Vital Signs: Temp: 98  F (36.7  C) Temp src: Axillary BP: 100/54 Pulse: 97   Resp: 16 SpO2: (!) 71 % O2 Device: Oxymask Oxygen Delivery: 6 LPM  Weight: 121 lbs 4.05 oz    Physical Exam      Primary Care Physician   Shaylee Jacques    Discharge Disposition   Patient  during this admission  Condition at discharge:     Significant Results and Procedures   Most Recent 3 CBC's:  Recent Labs   Lab Test 24  2241 24  1723 01/06/24  0725 24  1807   WBC  --  1.6* 8.4 12.2*   HGB 6.7* 7.2* 7.5* 7.4*   MCV  --  90 90 90   PLT  --  63* 78* 89*     Most Recent 3 BMP's:  Recent Labs   Lab Test 24  0418 24  0027 24  2144 24  1922 24  1723 24  1008 24  0725 24  1840 24  1807   NA  --   --   --   --  133*  --  137  --  138   POTASSIUM  --   --   --   --  3.6  --  4.6  --  5.6*   CHLORIDE  --   --   --   --  109*  --  111*  --  112*   CO2  --   --   --   --  16*  --  19*  --  18*   BUN  --   --   --   --  26.9*  --  28.6*  --  26.5*   CR  --   --   --   --  1.60*  --  1.78*  --  1.76*   ANIONGAP  --   --   --   --  8  --  7  --  8   JERI  --   --   --   --  7.5*  --  8.0*  --  7.2*   * 107* 102*   < > 80   < > 149*   < > 350*    < > = values in this interval not displayed.     Most Recent 2 LFT's:  Recent Labs   Lab Test 24  0725 23  1325   AST 53* 34   ALT 9 21   ALKPHOS 46 89   BILITOTAL 0.6 0.6     Most Recent 3 INR's:  Recent Labs   Lab Test 24  2034 23  1325   INR 1.76* 1.10     Most Recent 3 Troponin's:No lab results found.  Most Recent 3 BNP's:  Recent Labs   Lab Test 24  1723 24  0725   NTBNPI 1,902*  --    NTBNP  --  1,200     Most Recent D-dimer:No lab results found.  Most Recent Cholesterol Panel:  Recent Labs   Lab Test 23  0859   CHOL 185   LDL 65      TRIG 51       Results for orders  placed or performed during the hospital encounter of 12/31/23   CTA Abdomen Pelvis with Contrast    Narrative    EXAM: CTA ABDOMEN PELVIS WITH CONTRAST  LOCATION: [facility]  DATE: 12/31/2023    INDICATION: GI bleed with dark and bloody stools.  COMPARISON: CT 01/20/2023  TECHNIQUE: CT angiogram abdomen pelvis during arterial phase of injection of IV contrast. 2D and 3D MIP reconstructions were performed by the CT technologist. Dose reduction techniques were used.  CONTRAST: isovue 370  75ml    FINDINGS:  ANGIOGRAM ABDOMEN/PELVIS: No evidence of active GI bleed. Prominent intramural vascularity involving segments of the ascending, transverse and descending colon with early draining veins, suggesting angiodysplasia.    The celiac, SMA and single left renal artery are widely patent. Moderate origin stenoses of single right renal artery and ELOISA. Moderate aortoiliac atherosclerosis without aneurysm.    LOWER CHEST: Heavy mitral annular calcifications.    HEPATOBILIARY: Cholelithiasis with multiple tiny gallstones. No cholecystitis nor bile duct dilatation. Negative liver.    PANCREAS: Multiple parenchymal and intraductal stones region of pancreatic head redemonstrated. Largest stone within the distal pancreatic duct measures 5 x 10 mm coronal image 43 series 11. Pancreatic duct is nondilated with no peripancreatic   inflammatory change.    SPLEEN: Normal.    ADRENAL GLANDS: Normal.    KIDNEYS/BLADDER: Mild left renal cortical atrophy. No urinary tract calculus nor hydronephrosis. Mild bladder mucosal hyperenhancement with perivesicular edema/inflammation.    BOWEL: No evidence of active GI bleed Diverticulosis of the colon. No acute inflammatory change. No obstruction.. Previous appendectomy.    LYMPH NODES: No lymphadenopathy.    PELVIC ORGANS: Hysterectomy.    MUSCULOSKELETAL: Interval increase in right paramidline supraumbilical ventral hernia which now contains mesenteric fat and short  segment of sigmoid colon without obstruction. No suspicious osseous lesions.      Impression    IMPRESSION:  1.  No evidence of active GI bleed.  2.  Prominent intramural vascularity with early draining veins involving segments of the ascending, transverse and descending colon, suggesting angiodysplasia.  3.  Colonic diverticulosis with no acute inflammatory bowel process nor evidence of obstruction.  4.  Cholelithiasis with multiple tiny gallstones. No cholecystitis nor bile duct dilatation.  5.  Multiple parenchymal and intraductal pancreatic calcifications compatible with sequela from chronic pancreatitis.  6.  Interval enlargement of right paramidline supraumbilical hernia which now contains mesenteric fat and a short segment of transverse colon without obstruction.   IR Visceral Angiogram    Narrative    [facility]  LOCATION: [facility]  DATE: 1/2/2024    PROCEDURE: SELECTIVE AND SUPERSELECTIVE MESENTERIC ARTERIOGRAPHY.  1.  Ultrasound-guided access of the right common femoral artery. A permanent image was stored.  2.  Digital subtraction celiac arteriography (first order).  3.  Digital subtraction superior mesenteric arteriography (first order).  4.  Digital subtraction inferior mesenteric arteriography (first order).  5.  Digital subtraction ascending left colic arteriography (second order).  6.  Digital subtraction descending left colic arteriography (second order).  7.  Digital subtraction retrograde marginal arteriography to the splenic flexure and transverse colon (third order).  8.  Arterial closure device.  9.  Moderate sedation.    INTERVENTIONAL RADIOLOGIST: Caio Che MD.    INDICATION: 78-year-old female with lower gastrointestinal hemorrhage. Endoscopy revealed potential sources of extravasation in the distal transverse and descending colon.    CONSENT: The risks, benefits and alternatives of the stated procedure were discussed with the patient  in detail. All  questions were answered. Informed consent was given to proceed with the procedure.    MODERATE SEDATION: Versed 1 mg IV; Fentanyl 25 mcg IV.  During the timeout, immediately prior to the administration of medications, the patient was reassessed for adequacy to receive conscious sedation. Under physician supervision, Versed and fentanyl   were administered for moderate sedation. Pulse oximetry, heart rate and blood pressure were continuously monitored by an independent trained observer. The physician spent 32 minutes of face-to-face sedation time with the patient.    CONTRAST: 150 mL Omnipaque.  ANTIBIOTICS: None.  ADDITIONAL MEDICATIONS: None.    FLUOROSCOPIC TIME: 12.1 minutes.  RADIATION DOSE: Air Kerma: 722 mGy.    COMPLICATIONS: No immediate complications.    STERILE BARRIER TECHNIQUE: Maximum sterile barrier technique was used. Cutaneous antisepsis was performed at the operative site with application of 2% chlorhexidine and large sterile drape. Prior to the procedure, the  and assistant performed   hand hygiene and wore hat, mask, sterile gown, and sterile gloves during the entire procedure.    PROCEDURE:    Using real-time ultrasound guidance, access was achieved into the right common femoral artery and conversion was made for a 5 Lithuanian vascular sheath which was attached to a continuous heparinized saline infusion.    A Steiner 1 catheter was utilized to selectively engage the celiac artery and digital subtraction arteriography was obtained.    The Steiner 1 catheter was utilized to selectively engage the superior mesenteric artery and digital subtraction arteriography was obtained.    The Steiner 1 catheter was utilized to selectively engage the inferior mesenteric artery and digital subtraction arteriography was obtained.    In order to obtain better purchase the access sheath was exchanged for a 25 cm 5 Lithuanian vascular sheath. Through this a Ej catheter was utilized to selectively reengage  the inferior mesenteric artery. Through this a Progreat microcatheter was   advanced into the ascending left colic branch and digital subtraction arteriography was obtained. The microcatheter was repositioned into the descending left colic branch and digital subtraction arteriography was obtained. The microcatheter was advanced   more distally retrograde through the marginal artery near the splenic flexure of the colon. Repeat digital subtraction arteriography was obtained.    The catheter and sheath were removed with hemostasis achieved via the Perclose suture device with additional manual compression.    FINDINGS:  Ultrasound demonstrates a patent and pulsatile right common femoral artery with moderate atheromatous calcification. A permanent image was stored.    The digital subtraction celiac arteriography shows no identified focus of extravasation. Extensive aortic atheromatous calcification is present.    The digital subtraction superior mesenteric arteriography shows no identified site of extravasation.    The digital subtraction inferior mesenteric arteriography shows no identified site of extravasation.    The digital subtraction ascending left colic arteriography shows no identified site of extravasation. Endoscopic clips are seen near the splenic flexure of the colon.    The digital subtraction descending left colic arteriography shows no identified site of extravasation. Endoscopic clips are seen near the lower descending colon.    The digital subtraction retrograde marginal arteriography to the splenic flexure of the colon shows no identified site of extravasation. Endoscopic clips are seen near the splenic flexure of the colon.      Impression    IMPRESSION:    No identified extravasation on the selective or superselective mesenteric arteriography.       XR Chest Port 1 View    Narrative    EXAM: XR CHEST PORT 1 VIEW  LOCATION: [facility]  DATE: 1/4/2024    INDICATION: crepitus  in upper chest, POD0 from partial colon resection  COMPARISON: None.      Impression    IMPRESSION: Heart size and pulmonary vascularity normal. The lungs are clear. No pneumothorax. Extensive subcutaneous gas along the left lateral chest wall and along the right and left base of the neck.   US Upper Extremity Venous Duplex Right    Narrative    EXAM: US UPPER EXTREMITY VENOUS DUPLEX RIGHT  LOCATION: [facility]  DATE: 1/5/2024    INDICATION: Swelling and erythema, r/o DVT.  COMPARISON: None.  TECHNIQUE: Venous Duplex ultrasound of the right upper extremity with (when possible) and without compression, augmentation, and duplex. Color flow and spectral Doppler with waveform analysis performed.    FINDINGS: Ultrasound includes evaluation of the internal jugular vein, innominate vein, subclavian vein, axillary vein, and brachial vein. The superficial cephalic and basilic veins were also evaluated where seen.     RIGHT: No deep venous thrombosis. Superficial thrombophlebitis in the cephalic vein from the distal upper arm through the wrist. Additionally, one of the branches of the basilic vein contains thrombus at the antecubital fossa.      Impression    IMPRESSION:  1.  No deep venous thrombosis in the right upper extremity.    2.  Acute superficial thrombophlebitis as described above.   XR Chest 1 View    Narrative    EXAM: XR CHEST 1 VIEW  LOCATION: [facility]  DATE: 1/6/2024    INDICATION: Hypoxia. R o pna  COMPARISON: 01/04/2024      Impression    IMPRESSION: Development of mild basilar opacities, greater on the right. These are nonspecific but infection is favored, particularly in the right base. Normal heart size and pulmonary vascularity. PICC line tip in the low SVC. Soft tissue gas in the   chest wall bilaterally, greater on the left, is not significantly changed.   CT Abdomen Pelvis w/o Contrast    Narrative    EXAM: CT ABDOMEN PELVIS W/O CONTRAST  LOCATION:   [facility]  DATE: 1/6/2024    INDICATION: shock. Hx low GI bleed s p laparoscopic descending and sigmoid colon resection then laparotomy and evacuation of intra abdominal hematoma.  COMPARISON: CTA abdomen/pelvis 12/31/2023  TECHNIQUE: CT scan of the abdomen and pelvis was performed without IV contrast. Multiplanar reformats were obtained. Dose reduction techniques were used.  CONTRAST: None.    FINDINGS:   LOWER CHEST: Coronary artery atherosclerotic calcifications partially visualized. [facility] mitral annular calcifications. Central venous catheter which terminates at the right atrium. Small bilateral pleural effusions right greater than left with   compressive atelectasis. There are also groundglass opacities of the lung bases which could be seen with aspiration/infection.    HEPATOBILIARY: Grossly unremarkable noncontrast CT appearance of the liver. The gallbladder is moderately distended which is most likely related to fasting state. Layering sludge and small stones are present.    PANCREAS: Pancreatic calcifications compatible with underlying chronic pancreatitis. No peripancreatic inflammation to suggest acute pancreatitis.    SPLEEN: Normal.    ADRENAL GLANDS: Normal.    KIDNEYS/BLADDER: No hydronephrosis. Otrres catheter within the urinary bladder which is decompressed.    BOWEL: Small volume free intraperitoneal gas compatible with recent surgery. Right anterior approach percutaneous drain which terminates in the lower left paracolic gutter. Small volume abdominopelvic free fluid. No large intra-abdominal or intra-pelvic   hematoma currently. Partial colectomy. Nasogastric tube terminates in the stomach. Diffusely thickened appearance of multiple loops of small bowel as well as the colon. No discrete evidence of bowel wall pneumatosis.    LYMPH NODES: No lymphadenopathy.    VASCULATURE: Diffuse chunky atherosclerotic calcifications of the aortoiliac vessels without evidence of  aneurysmal dilatation. Relatively flattened iliac veins and IVC suggestive of hypovolemia.    PELVIC ORGANS: Hysterectomy.    MUSCULOSKELETAL: Scattered areas of gas within the subcutaneous fat of the abdominal wall consistent with recent surgery. Body wall edema. No acute osseous abnormality.      Impression    IMPRESSION:   1.  Postsurgical changes of recent laparoscopic partial colectomy and laparotomy for evacuation of intra-abdominal hematoma. Small volume free intraperitoneal gas compatible with recent surgery. Right anterior approach percutaneous drain which terminates   in the lower left paracolic gutter. Small volume abdominopelvic free fluid. No large intra-abdominal or intrapelvic hematoma currently.  2.  Diffusely thickened appearance of multiple loops of small bowel as well as the colon. Findings are nonspecific and could be due to bowel ischemia or generalized edema. Note is made of relatively flattened iliac veins and IVC suggestive of   hypovolemia. No discrete areas of bowel wall pneumatosis. Recommend correlation with lactate.  3.  Body wall edema. Scattered areas of gas within the subcutaneous fat of the abdominal wall consistent with recent surgery.  4.  Small bilateral pleural effusions right greater than left with compressive atelectasis. There are also groundglass opacities of the lung bases which could be seen with aspiration/infection.     XR Abdomen 1 View    Narrative    EXAM: XR ABDOMEN 1 VIEW  LOCATION: [facility]  DATE: 1/6/2024    INDICATION: tube feeding placement  COMPARISON: 12/31/2023.      Impression    IMPRESSION:   Enteric tube tip and side-port within the stomach.       Discharge Orders   No discharge procedures on file.  Discharge Medications   Current Discharge Medication List        CONTINUE these medications which have NOT CHANGED    Details   albuterol (PROAIR HFA) 90 mcg/actuation inhaler Inhale 1-2 puffs into the lungs 3 times daily       albuterol (PROVENTIL) (2.5 MG/3ML) 0.083% neb solution Take 2.5 mg by nebulization every 6 hours as needed for shortness of breath, wheezing or cough      amLODIPine-benazepril (LOTREL) 10-20 mg per capsule Take 1 capsule by mouth daily      atorvastatin (LIPITOR) 80 MG tablet Take 80 mg by mouth at bedtime      CHOLECALCIFEROL, VITAMIN D3, ORAL Take 2,000 Units by mouth daily      mirtazapine (REMERON) 15 MG tablet Take 15 mg by mouth at bedtime      mometasone-formoterol (DULERA) 200-5 mcg/actuation HFAA inhaler Inhale 2 puffs into the lungs at bedtime      Multiple Vitamins-Minerals (PRESERVISION AREDS PO) Take 2 tablets by mouth daily      naproxen (NAPROSYN) 250 MG tablet Take 1 tablet (250 mg) by mouth every 12 hours as needed for other (Inflammatory pain)  Qty: 20 tablet, Refills: 0    Associated Diagnoses: Hydronephrosis, left      omega-3-dha-epa-dpa-fish oil (OMEGA-3 2100) 1,050-1,200 mg cap Take 1 capsule by mouth daily      predniSONE (DELTASONE) 10 MG tablet Take 10 mg by mouth as needed 1-2 tabs orally once a day as needed for COPD flare up for 7 days.      primidone (MYSOLINE) 50 MG tablet Take 100 mg by mouth 2 times daily      sertraline (ZOLOFT) 50 MG tablet Take 50 mg by mouth daily      SYNTHROID 75 MCG tablet Take 75 mcg by mouth daily      vitamin C-biotin (HAIR-SKIN-NAILS, VIT C-BIOTIN,) 50 mg -1,250 mcg Chew Take 1 capsule by mouth daily           Allergies   Allergies   Allergen Reactions    Bactrim [Sulfamethoxazole-Trimethoprim] Nausea and Vomiting and Diarrhea     Critically high      Sudafed [Pseudoephedrine] Dizziness     Light headed

## 2024-01-07 NOTE — PHARMACY-VANCOMYCIN DOSING SERVICE
"Pharmacy Vancomycin Initial Note  Date of Service 2024  Patient's  1945  78 year old, female    Indication: Sepsis    Current estimated CrCl = Estimated Creatinine Clearance: 24.8 mL/min (A) (based on SCr of 1.6 mg/dL (H)).    Creatinine for last 3 days  2024:  6:24 AM Creatinine 0.94 mg/dL; 10:52 PM Creatinine 1.13 mg/dL  2024:  6:03 AM Creatinine 1.39 mg/dL;  6:07 PM Creatinine 1.76 mg/dL  2024:  7:25 AM Creatinine 1.78 mg/dL;  5:23 PM Creatinine 1.60 mg/dL    Recent Vancomycin Level(s) for last 3 days  No results found for requested labs within last 3 days.      Vancomycin IV Administrations (past 72 hours)        No vancomycin orders with administrations in past 72 hours.                    Nephrotoxins and other renal medications (From now, onward)      Start     Dose/Rate Route Frequency Ordered Stop    24  vancomycin (VANCOCIN) 750 mg in sodium chloride 0.9 % 250 mL intermittent infusion        See Hyperspace for full Linked Orders Report.    750 mg  over 60 Minutes Intravenous EVERY 24 HOURS 24 230  piperacillin-tazobactam (ZOSYN) 3.375 g vial to attach to  mL bag        Note to Pharmacy: For [facility], [facility] and [facility]: For Zosyn-naive patients, use the \"Zosyn initial dose + extended infusion\" order panel.    3.375 g  over 240 Minutes Intravenous EVERY 8 HOURS 24  vancomycin (VANCOCIN) 1,000 mg in 200 mL dextrose intermittent infusion        See Hyperspace for full Linked Orders Report.    1,000 mg  200 mL/hr over 1 Hours Intravenous ONCE 24  norepinephrine (LEVOPHED) 4 mg in  mL infusion PREMIX         0.01-0.6 mcg/kg/min × 55 kg  2.1-123.8 mL/hr  Intravenous CONTINUOUS 24 19124  vasopressin (VASOSTRICT) 20 Units in sodium chloride 0.9 % 100 mL standard conc infusion         2.4 Units/hr  12 mL/hr  Intravenous CONTINUOUS 24              Contrast " Orders - past 72 hours (72h ago, onward)      None            [employer] Prediction of Planned Initial Vancomycin Regimen  Loading dose: 1000 mg at 20:00 01/06/2024.  Regimen: 750 mg IV every 24 hours.  Start time: 20:00 on 01/07/2024  Exposure target: AUC24 (range)400-600 mg/L.hr   AUC24,ss: 549 mg/L.hr  Probability of AUC24 > 400: 84 %  Ctrough,ss: 18.4 mg/L  Probability of Ctrough,ss > 20: 41 %  Probability of nephrotoxicity (Lodise TINO 2009): 15 %          Plan:  Start vancomycin  1000 mg IV once followed by 750 mg IV q24h.   Vancomycin monitoring method: AUC  Vancomycin therapeutic monitoring goal: 400-600 mg*h/L  Pharmacy will check vancomycin levels as appropriate in 1-3 Days.      Delia Grace, [facility]

## 2024-01-07 NOTE — PROGRESS NOTES
ICU NOTE    Family was updated at bedside.   Patient had increased O2 and pressor requirements since ICU admission.  Receiving IV pain meds around the clock for abdominal pain.   Agitation / delirium.     Recent tests were reviewed.   Abdomen CT scan showed postsurgical changes.   Received additional unit of RBC and FFP for dropping H/H  Trending up lactic acid and decrease urine output.     Family decided to change acute care to comfort measures.  Will acknowledge family wishes     Jerardo Alvarez MD  Critical Care medicine  01/07/24  4:51 AM

## 2024-01-07 NOTE — PROGRESS NOTES
Pt placed on HFNC due to increased WOB and decreased sats, settings 70% 40 lpm 31 degrees, sats 95-96% and WOB improved.  Rt continue to monitor.    Ted Medina,  RT

## 2024-01-07 NOTE — PLAN OF CARE
Problem: Palliative Care  Goal: Enhanced Quality of Life  Outcome: Adequate for Care Transition  Intervention: Maximize Comfort  Recent Flowsheet Documentation  Taken 2024 0400 by James Serrato RN  Oral Care: swabbed with sterile water  Taken 2024 0000 by James Serrato RN  Oral Care: swabbed with sterile water  Intervention: Optimize Function  Recent Flowsheet Documentation  Taken 2024 0400 by James Serrato RN  Sensory Stimulation Regulation:   care clustered   music on  Taken 2024 0000 by James Serrato RN  Sensory Stimulation Regulation:   care clustered   music on   [facility] - ICU    RN Progress Note:            Pertinent Assessments:      Please refer to flowsheet rows for full assessment     Patient deteriorating quicker than expected after trip to CT scan and a clean up, family informed and came to be with patient since 0100. Noted increasing O2 needs, shallow, using accessory muscles, congested weak cough lethargic. Rt gave neb for some wheezing. Very weepy upper extremities right more than left. Vit K given, 1 PRBC, 1 FFP, tolerated well.            Key Events - This Shift:   Around 3:30 am, more short of breath, agitated, looked very uncomfortable, calling help from her Mother (). Dilaudid IV prn given x 2 which helped keeping her comfortable. Gave information regarding comfort care and family thought we are doing it already. Family is very sure of leaning towards comfort care, MD notified. Dr Newby came to see and talked to patient's family. Started comfort care around 0500. Turned off pressor and IVF, switched HFNC to oxymask 6L. Noted O2 sat went down 86% right away, BP softer, HR on the 80-90, continuous to appear comfortable after last dilaudid shot. Offered if family would like  to see patient again this morning but said they're ok. Will continue to monitor.            Barriers to Discharge / Downgrade:

## 2024-01-07 NOTE — PROGRESS NOTES
MD updated by Elastra messaging regarding low blood pressure and sedation level. MD came to see pt. And talked with family. Pt. Lethargic. Oxymask applied due to oxygen saturation dropping.    See MD note.   Intensivist came to see pt. And talk with family also. Orders to transfer to ICU.   Report given to Melanie in ICU. Pt. Transferred along with belongings and medications. Family here at time of transfer.     Claire Smart RN

## 2024-01-07 NOTE — PROGRESS NOTES
Care Management Follow Up    Length of Stay (days): 7    Expected Discharge Date: 01/08/2024     Concerns to be Addressed:       Patient plan of care discussed at interdisciplinary rounds: Yes    Anticipated Discharge Disposition: Other (Comments) (comfort care)     Anticipated Discharge Services:    Anticipated Discharge DME:      Patient/family educated on Medicare website which has current facility and service quality ratings:    Education Provided on the Discharge Plan:    Patient/Family in Agreement with the Plan:      Referrals Placed by CM/SW:    Private pay costs discussed: Not applicable    Additional Information:  Chart reviewed. Discussed with MD. Patient made comfort cares. Anticipate will remain in hospital for end of life. CM available if needs arise     Mari Quiñonez RN

## 2024-01-07 NOTE — PROGRESS NOTES
HFNC increased 02 to 90% and flow to 50 lpm, due to increased WOB and low sats. Prn Albuterol neb given pt is coarse with faint exp wheezes. No change post neb tx.    Ted Medina,  RT

## 2024-01-07 NOTE — CONSULTS
SPIRITUAL HEALTH SERVICES (SHS)  SPIRITUAL ASSESSMENT Progress Note  [facility]. Unit ICU    REFERRAL SOURCE: Consult (Routine)      Thank you for the referral. Was paged to the bedside of Jessica Mari (Terri)livan who is not expected to make it through the night. I prayed the prayers of commendation of the dying. Kimmy joined in as she could and thanked me for the prayer. No family were present.     Kimmy's Mu-ism is Mormonism. [facility] but her sister Corinne asked me not to call there. Corinne lives at [facility] and still really wants her sister anointed.  Corinne's phone number is [phone].     Spoke with Corinne and Kimmy's daughter Isabelle.    I spoke with Fr Gray who has agreed to come tonight (Sat. Jan 6) to do an anointing.      I also updated Kimmy's Mu-ism to Mormonism in demographics.      PLAN: SHS remain available for spiritual and emotional support during this hospitalization.      Lelo Chatterjee, Ph.D., [facility]      SHS available 24/7 for emergency requests/referrals, either by having the on-call  paged or by entering an ASAP/STAT consult in Epic (this will also page the on-call ).

## 2024-01-07 NOTE — PROGRESS NOTES
SPIRITUAL HEALTH SERVICES Progress Note    Saw pt Jessica Reynolds and offered Mosque sacrament of anointing for the healing of the sick.     Fr. Isaac Edwards

## 2024-01-10 LAB
BACTERIA FLD CULT: ABNORMAL
GRAM STAIN RESULT: ABNORMAL

## 2024-01-11 LAB — BACTERIA BLD CULT: NO GROWTH

## 2024-04-03 NOTE — PROGRESS NOTES
General Surgery Progress Note:    Hospital Day # 4    ASSESSMENT:     1. BRBPR (bright red blood per rectum)        Jessica Reynolds is a 78 year old female with painless bright red blood per rectum on 12/31/23. Anemia with last Hgb 7.4 - 6.9 status post 5 units of pRBCs across 2 days.  Patient had colonoscopy on 1/2/24 with clips placed at potential areas of bleeding in the transverse and descending colon.  Significant blood in the colon per note.  IR consulted, mesenteric angiogram did not identify source of bleeding.  Patient continues to have bloody bowel movements, noting 3 just this morning.  With the ongoing bleeding, I feel intervention is now merited.  Hemoglobin downtrending, Afebrile, stable vital signs, no leukocytosis.  Plan for combination case this morning with GI to assess and manage source of bleeding.    PLAN:   -NPO strict for surgery  -Continue to monitor hemoglobin with transfusions per primary team  -Planned for OR this morning for concurrent colonoscopy and laparoscopic exploration, unclear what will be necessary to control the bleeding source but looking to avoid a formal colon resection if possible  -Medical management per primary team    I have seen and examined the patient, and agree with or have addended the assessment and plan as outlined above.    Caden Jenkins MD, FACS  [phone]  [facility]  General and Bariatric Surgery    SUBJECTIVE:   Jessica Reynolds was seen on rounds.  Patient states she still feels lightheaded and very tired this morning.  States that she did not have bloody bowel movements overnight but has already had 3 this morning.  Denies feeling feverish, chills, nausea, vomiting.  Does not know if she is passing gas as well.  Mild increase in abdominal pain.  Is amenable to surgical plan, would like her daughter to be informed.    Patient Vitals for the past 24 hrs:   BP Temp Temp src Pulse Resp SpO2   01/04/24 0717 118/53 98  F (36.7  C) Oral 71 18 99 %  What Type Of Note Output Would You Prefer (Optional)?: Bullet Format   01/04/24 0419 120/55 98.6  F (37  C) Oral 73 18 97 %   01/03/24 2331 108/53 98  F (36.7  C) Oral 71 16 98 %   01/03/24 1700 124/58 -- -- 73 -- 99 %   01/03/24 1547 (!) 144/64 98.4  F (36.9  C) Oral 76 18 97 %   01/03/24 1250 (!) 150/66 98  F (36.7  C) -- -- 18 98 %   01/03/24 1209 (!) 142/66 98  F (36.7  C) Oral 72 18 99 %   01/03/24 1109 136/65 98.2  F (36.8  C) Oral 76 20 98 %   01/03/24 1019 (!) 142/60 98.1  F (36.7  C) Oral 73 18 98 %       Physical Exam:  General: patient seen resting in bed in no acute distress, sleepy  Resp: no increased work of breathing, breathing comfortably on room air  Abdomen: Soft, nondistended, mild to no tenderness with palpation over left quadrants, nontender to palpation over other quadrants.  No signs of acute abdomen.  Extremities: No edema, cyanosis, or obvious deformities visualized on exam    Admission on 12/31/2023   Component Date Value    Sodium 12/31/2023 138     Potassium 12/31/2023 4.1     Chloride 12/31/2023 101     Carbon Dioxide (CO2) 12/31/2023 26     Anion Gap 12/31/2023 11     Urea Nitrogen 12/31/2023 11.5     Creatinine 12/31/2023 0.96 (H)     GFR Estimate 12/31/2023 60 (L)     Calcium 12/31/2023 9.1     Glucose 12/31/2023 185 (H)     Protein Total 12/31/2023 6.4     Albumin 12/31/2023 3.8     Bilirubin Total 12/31/2023 0.6     Alkaline Phosphatase 12/31/2023 89     AST 12/31/2023 34     ALT 12/31/2023 21     Bilirubin Direct 12/31/2023 <0.20     INR 12/31/2023 1.10     aPTT 12/31/2023 32     WBC Count 12/31/2023 7.7     RBC Count 12/31/2023 3.82     Hemoglobin 12/31/2023 12.6     Hematocrit 12/31/2023 36.9     MCV 12/31/2023 97     MCH 12/31/2023 33.0     MCHC 12/31/2023 34.1     RDW 12/31/2023 14.6     Platelet Count 12/31/2023 166     % Neutrophils 12/31/2023 62     % Lymphocytes 12/31/2023 23     % Monocytes 12/31/2023 10     % Eosinophils 12/31/2023 3     % Basophils 12/31/2023 1     % Immature Granulocytes 12/31/2023 1     NRBCs per 100 WBC 12/31/2023 0   What Is The Reason For Today's Visit?: Full Body Skin Examination    Absolute Neutrophils 12/31/2023 4.8     Absolute Lymphocytes 12/31/2023 1.8     Absolute Monocytes 12/31/2023 0.8     Absolute Eosinophils 12/31/2023 0.3     Absolute Basophils 12/31/2023 0.1     Absolute Immature Granul* 12/31/2023 0.0     Absolute NRBCs 12/31/2023 0.0     ABO/RH(D) 12/31/2023 O POS     Antibody Screen 12/31/2023 Negative     SPECIMEN EXPIRATION DATE 12/31/2023 [id number]     Hemoglobin 12/31/2023 11.1 (L)     Hemoglobin A1C 12/31/2023 5.7 (H)     WBC Count 01/01/2024 6.6     RBC Count 01/01/2024 3.36 (L)     Hemoglobin 01/01/2024 11.0 (L)     Hematocrit 01/01/2024 31.7 (L)     MCV 01/01/2024 94     MCH 01/01/2024 32.7     MCHC 01/01/2024 34.7     RDW 01/01/2024 14.7     Platelet Count 01/01/2024 143 (L)     Sodium 01/01/2024 140     Potassium 01/01/2024 4.2     Chloride 01/01/2024 109 (H)     Carbon Dioxide (CO2) 01/01/2024 24     Anion Gap 01/01/2024 7     Urea Nitrogen 01/01/2024 14.0     Creatinine 01/01/2024 0.95     GFR Estimate 01/01/2024 61     Calcium 01/01/2024 8.9     Glucose 01/01/2024 100 (H)     Hemoglobin 01/01/2024 9.0 (L)     WBC Count 01/02/2024 5.5     RBC Count 01/02/2024 2.02 (L)     Hemoglobin 01/02/2024 6.4 (LL)     Hematocrit 01/02/2024 19.7 (L)     MCV 01/02/2024 98     MCH 01/02/2024 31.7     MCHC 01/02/2024 32.5     RDW 01/02/2024 14.7     Platelet Count 01/02/2024 119 (L)     Sodium 01/02/2024 145     Potassium 01/02/2024 3.6     Chloride 01/02/2024 112 (H)     Carbon Dioxide (CO2) 01/02/2024 24     Anion Gap 01/02/2024 9     Urea Nitrogen 01/02/2024 11.2     Creatinine 01/02/2024 0.92     GFR Estimate 01/02/2024 63     Calcium 01/02/2024 7.9 (L)     Glucose 01/02/2024 94     Magnesium 01/02/2024 1.8     Hemoglobin 01/02/2024 8.4 (L)     Blood Component Type 01/02/2024 Red Blood Cells     Product Code 01/02/2024 [id number]     Unit Status 01/02/2024 Transfused     Unit Number 01/02/2024 [id number]     CROSSMATCH 01/02/2024 Compatible     CODING SYSTEM 01/02/2024  What Is The Reason For Today's Visit? (Being Monitored For X): concerning skin lesions on an annual basis [id number]     ISSUE DATE AND TIME 01/02/2024 99721313997785     UNIT ABO/RH 01/02/2024 O-     UNIT TYPE ISBT 01/02/2024 9500     Blood Component Type 01/02/2024 Red Blood Cells     Product Code 01/02/2024 [id number]     Unit Status 01/02/2024 Transfused     Unit Number 01/02/2024 [id number]     CROSSMATCH 01/02/2024 Compatible     CODING SYSTEM 01/02/2024 [id number]     ISSUE DATE AND TIME 01/02/2024 76171855088500     UNIT ABO/RH 01/02/2024 O+     UNIT TYPE ISBT 01/02/2024 5100     COLONOSCOPY 01/02/2024                      Value:[place] [id number] [place] [address]  _______________________________________________________________________________  Patient Name: Jessica Renyolds        Procedure Date: 1/2/2024 10:22 AM  MRN: [id number]                       Account Number: [id number]  YOB: 1945              Admit Type: Inpatient  Age: 78                               Room: [place] [address]  Note Status: Finalized                Attending MD: TRINA ZAMBRANO MD,   Instrument Name: Adult Colonoscope 1196   _______________________________________________________________________________     Procedure:             Colonoscopy  Indications:           Hematochezia  Providers:             TRINA ZAMBRANO MD  Referring MD:            Medicines:             Monitored Anesthesia Care  Complications:         No immediate complications.  _______________________________________________________________________________  Procedure:             Pre-Anesthesia                           Assessment:                         - Prior to the procedure, a History and Physical was                          performed, and patient medications and allergies were                          reviewed. The patient is competent. The risks and                          benefits of the procedure and the sedation options and                          risks were discussed with the patient.  All questions                          were answered and informed consent was obtained.                          Patient identification and proposed procedure were                          verified by the physician in the pre-procedure area.                          Mental Status Examination: alert and oriented. Airway                          Examination: normal oropharyngeal airway and neck                          mobility. Respiratory Examination: clear to                          auscultation. CV Examination: normal. Prophylactic                          Antibiotics: The patient does not require proph                          ylactic                          antibiotics. Prior Anticoagulants: The patient has                          taken no anticoagulant or antiplatelet agents. ASA                          Grade Assessment: IV - A patient with severe systemic                          disease that is a constant threat to life. After                          reviewing the risks and benefits, the patient was                          deemed in satisfactory condition to undergo the                          procedure. The anesthesia plan was to use monitored                          anesthesia care (MAC). Immediately prior to                          administration of medications, the patient was                          re-assessed for adequacy to receive sedatives. The                          heart rate, respiratory rate, oxygen saturations,                          blood pressure, adequacy of pulmonary ventilation, and                          response to care were monitored throughout the                                                    procedure. The physical status of the patient was                          re-assessed after the procedure.                         After obtaining informed consent, the colonoscope was                          passed under direct vision. Throughout the procedure,                           the patient's blood pressure, pulse, and oxygen                          saturations were monitored continuously. The adult                          colonoscope was introduced through the anus and                          advanced to the ileocolonic anastomosis. The                          colonoscopy was performed without difficulty. The                          patient tolerated the procedure well. The quality of                          the bowel preparation was adequate to identify polyps                          greater than 5 mm in size.                                                                                   Findings:       The perianal and digital rectal examinations were norm                          al.       A localized area of hemorrhagic mucosa was found in the transverse        colon. To stop active bleeding, three hemostatic clips were successfully        placed. There was no bleeding at the end of the maneuver.       A single small-mouthed diverticulum was found in the descending colon        with active hemorrhage. To stop active bleeding, two hemostatic clips        were successfully placed. There was no bleeding at the end of the        maneuver.       Multiple small and large-mouthed diverticula were found in the sigmoid        colon, descending colon and transverse colon.                                                                                   Moderate Sedation:       Moderate (conscious) sedation was personally administered by an        anesthesia professional. The following parameters were monitored: oxygen        saturation, heart rate, blood pressure, and response to care.  Impression:            - Hemorrhagic mucosa in the transverse colon. Clips                                                    were placed.                         - Diverticula in the descending colon with active                          hemorrhage. Clips were placed.                          - Diverticulosis in the sigmoid colon, in the                          descending colon and in the transverse colon.                         - No specimens collected.  Recommendation:        - [facility].                         - Continue to monitor for bleeding                         - If overt re-bleeding occurs, would recommend IR                          consultation. The clips placed more distally in the                          colon are felt to be at higher risk for re-bleeding.                         - NPO today                         - Will follow with you                                                                                     Electronically signed by Trina Zambrano MD  _______________________________  TRINA ZAMBRANO MD  1/2/2024 12:                          14:49 PM  I was physically present for the entire viewing portion of the exam.  __________________________  Signature of teaching physician  Amando/Roberto ZAMBRANO MD  Number of Addenda: 0    Note Initiated On: 1/2/2024 10:22 AM  Scope In: 10:46:02 AM  Scope Out: 11:41:13 AM      Hemoglobin 01/02/2024 7.3 (L)     Hemoglobin 01/02/2024 7.5 (L)     Blood Component Type 01/02/2024 Red Blood Cells     Product Code 01/02/2024 [id number]     Unit Status 01/02/2024 Transfused     Unit Number 01/02/2024 [id number]     CROSSMATCH 01/02/2024 Compatible     CODING SYSTEM 01/02/2024 [id number]     ISSUE DATE AND TIME 01/02/2024 20240102170100     UNIT ABO/RH 01/02/2024 O+     UNIT TYPE ISBT 01/02/2024 5100     Hemoglobin 01/03/2024 8.7 (L)     Hold Specimen 01/02/2024 JI     Blood Component Type 01/02/2024 Red Blood Cells     Product Code 01/02/2024 [id number]     Unit Status 01/02/2024 Transfused     Unit Number 01/02/2024 [id number]     CROSSMATCH 01/02/2024 Compatible     CODING SYSTEM 01/02/2024 [id number]     ISSUE DATE AND TIME 01/02/2024 20240102213300     UNIT ABO/RH 01/02/2024 O+     UNIT TYPE ISBT  01/02/2024 5100     Sodium 01/03/2024 142     Potassium 01/03/2024 4.2     Chloride 01/03/2024 112 (H)     Carbon Dioxide (CO2) 01/03/2024 19 (L)     Anion Gap 01/03/2024 11     Urea Nitrogen 01/03/2024 14.5     Creatinine 01/03/2024 0.96 (H)     GFR Estimate 01/03/2024 60 (L)     Calcium 01/03/2024 7.4 (L)     Glucose 01/03/2024 113 (H)     WBC Count 01/03/2024 12.3 (H)     RBC Count 01/03/2024 2.64 (L)     Hemoglobin 01/03/2024 7.8 (L)     Hematocrit 01/03/2024 23.3 (L)     MCV 01/03/2024 88     MCH 01/03/2024 29.5     MCHC 01/03/2024 33.5     RDW 01/03/2024 18.6 (H)     Platelet Count 01/03/2024 89 (L)     Magnesium 01/03/2024 1.6 (L)     Hemoglobin 01/03/2024 8.3 (L)     Blood Component Type 01/03/2024 Red Blood Cells     Product Code 01/03/2024 [id number]     Unit Status 01/03/2024 Transfused     Unit Number 01/03/2024 [id number]     CROSSMATCH 01/03/2024 Compatible     CODING SYSTEM 01/03/2024 [id number]     ISSUE DATE AND TIME 01/03/2024 [id number]     UNIT ABO/RH 01/03/2024 O+     UNIT TYPE ISBT 01/03/2024 5100     Hemoglobin 01/03/2024 8.4 (L)     Hemoglobin 01/03/2024 7.7 (L)     GLUCOSE BY METER POCT 01/03/2024 100 (H)     Hemoglobin 01/04/2024 7.4 (L)     Sodium 01/04/2024 138     Potassium 01/04/2024 3.5     Chloride 01/04/2024 109 (H)     Carbon Dioxide (CO2) 01/04/2024 20 (L)     Anion Gap 01/04/2024 9     Urea Nitrogen 01/04/2024 11.8     Creatinine 01/04/2024 0.94     GFR Estimate 01/04/2024 61     Calcium 01/04/2024 7.4 (L)     Glucose 01/04/2024 90     Magnesium 01/04/2024 1.9     GLUCOSE BY METER POCT 01/04/2024 89     GLUCOSE BY METER POCT 01/04/2024 94     WBC Count 01/04/2024 10.3     RBC Count 01/04/2024 2.26 (L)     Hemoglobin 01/04/2024 6.9 (LL)     Hematocrit 01/04/2024 20.4 (L)     MCV 01/04/2024 90     MCH 01/04/2024 30.5     MCHC 01/04/2024 33.8     RDW 01/04/2024 18.3 (H)     Platelet Count 01/04/2024 82 (L)     ABO/RH(D) 01/04/2024 O POS     SPECIMEN EXPIRATION DATE 01/04/2024  [id number]     Ventricular Rate 01/04/2024 69     Atrial Rate 01/04/2024 69     MD Interval 01/04/2024 144     QRS Duration 01/04/2024 82     QT 01/04/2024 472     QTc 01/04/2024 505     P Axis 01/04/2024 78     R AXIS 01/04/2024 65     T Axis 01/04/2024 138     Interpretation ECG 01/04/2024                      Value:Sinus rhythm  ST & T wave abnormality, consider anterior ischemia  Prolonged QT  Abnormal ECG  No previous ECGs available          ALBINO Patton [facility] Surgery  [address] [phone]

## (undated) DEVICE — CONNECTOR URETERAL CATH 140000

## (undated) DEVICE — Device

## (undated) DEVICE — SOL WATER IRRIG 1000ML BOTTLE 2F7114

## (undated) DEVICE — SUTURE VICRYL+ 4-0 UNDYED PS-2 VCP496H

## (undated) DEVICE — ADAPTER CATH CHECK-FLO 9FR FLL 050885 G15476

## (undated) DEVICE — SU SILK 2-0 SH 30" K833H

## (undated) DEVICE — ENDO TROCAR FIRST ENTRY KII FIOS Z-THRD 11X100MM CTF33

## (undated) DEVICE — STPL RELOAD REG TISSUE ECHELON 60 X 3.6MM BLUE GST60B

## (undated) DEVICE — ESU LIGASURE SEALER/DIVIDER RETRACT L-HOOK 37CM LF5637

## (undated) DEVICE — SPONGE RAY-TEC 4X8" 7318

## (undated) DEVICE — CUSTOM PACK LAP CHOLE SBA5BLCHEA

## (undated) DEVICE — ENDO TROCAR SLEEVE KII Z-THREADED 05X100MM CTS02

## (undated) DEVICE — WIRE GLIDE 0.035"X150CM VASC GR3506

## (undated) DEVICE — SOLUTION IRRIG 2B7127 .9NS 3000ML BAG

## (undated) DEVICE — PREP CHLORAPREP 26ML TINTED HI-LITE ORANGE 930815

## (undated) DEVICE — DRSG TELFA 3X8" 1238

## (undated) DEVICE — SUCTION MANIFOLD NEPTUNE 2 SYS 1 PORT 702-025-000

## (undated) DEVICE — SUTURE PASSOR W/GUIDE RSG-14F-4-WG

## (undated) DEVICE — ENDO TROCAR SHIELDED BLADED KII Z-THRD 12X100MM CTB73

## (undated) DEVICE — CLIP HEMOSTATIC ASSURANCE W11 MM 00711882

## (undated) DEVICE — MAT FLOOR SURGICAL 40X38 0702140238

## (undated) DEVICE — TUBING SUCTION MEDI-VAC 1/4"X20' N620A

## (undated) DEVICE — DRAIN BLAKE 19FR SIL 2231

## (undated) DEVICE — ENDO POUCH UNIV RETRIEVAL SYSTEM INZII 10MM CD001

## (undated) DEVICE — ESU LIGASURE MARYLAND LAPAROSCOPIC SLR/DVDR 5MMX37CM LF1937

## (undated) DEVICE — TUBING SMOKE EVAC PNEUMOCLEAR HIGH FLOW 0620050250

## (undated) DEVICE — SU ETHIBOND 0 CT-1 CR 8X18" CX21D

## (undated) DEVICE — TUBING SET THERMEDX UROLOGY SGL USE LL0006

## (undated) DEVICE — PREP DYNA-HEX 4% CHG SCRUB 4OZ BOTTLE MDS098710

## (undated) DEVICE — STPL ENDO ARTICULATING 60MM EC60A

## (undated) DEVICE — DECANTER VIAL 2006S

## (undated) DEVICE — PACK MINOR SINGLE BASIN SSK3001

## (undated) DEVICE — DRSG DRAIN 4X4" 7086

## (undated) DEVICE — KIT ENDO FIRST STEP DISINFECTANT 200ML W/POUCH EP-4

## (undated) DEVICE — CATH URETERAL OPEN END 6FR AXXCESS

## (undated) DEVICE — DRAIN RESERVOIR 100ML JP 0070740

## (undated) DEVICE — ENDO TROCAR FIRST ENTRY KII FIOS Z-THRD 05X100MM CTF03

## (undated) DEVICE — DRSG PRIMAPORE 03 1/8X6" 66000318

## (undated) DEVICE — ENDO SHEARS RENEW LAP ENDOCUT SCISSOR TIP 16.5MM 3142

## (undated) DEVICE — SUTURE VICRYL+ 2-0 27IN SH UND VCP417H

## (undated) DEVICE — CONTAINER URINE SPEC 4OZ STRL 1053

## (undated) DEVICE — FCP BIOPSY URETEROSCOPIC PIRAHNA  3FR M0065051600

## (undated) DEVICE — TUBING SUCTION MEDI-VAC 1/4"X20' N620A - HE

## (undated) DEVICE — ESU GROUND PAD ADULT REM W/15' CORD E7507DB

## (undated) DEVICE — SU SILK 2-0 FS-1 18" 685G

## (undated) DEVICE — SOL RINGERS LACTATED 1000ML BAG 2B2324X

## (undated) DEVICE — DRAPE SLEEVE 599

## (undated) DEVICE — SUCTION STRYKERFLOW II 250-070-500

## (undated) DEVICE — CUSTOM PACK CYSTO PREFERRED SOT5BCYHEA

## (undated) DEVICE — GLOVE SURG PI ULTRA TOUCH M SZ 7-1/2 LF

## (undated) DEVICE — GLOVE BIOGEL PI SZ 8.0 40880

## (undated) DEVICE — GUIDEWIRE BENTSON FLEX TIP 0.035"X150CM M0066201250

## (undated) DEVICE — SU WND CLOSURE VLOC 90 3-0 15CML VLOCM0604

## (undated) DEVICE — SUTURE VICRYL+ 0 27IN CT-1 UND VCP260H

## (undated) DEVICE — GOWN IMPERVIOUS BREATHABLE 2XL/XLONG

## (undated) RX ORDER — FENTANYL CITRATE 50 UG/ML
INJECTION, SOLUTION INTRAMUSCULAR; INTRAVENOUS
Status: DISPENSED
Start: 2023-01-01

## (undated) RX ORDER — LIDOCAINE HYDROCHLORIDE 10 MG/ML
INJECTION, SOLUTION INFILTRATION; PERINEURAL
Status: DISPENSED
Start: 2024-01-01

## (undated) RX ORDER — PROPOFOL 10 MG/ML
INJECTION, EMULSION INTRAVENOUS
Status: DISPENSED
Start: 2024-01-01

## (undated) RX ORDER — BUPIVACAINE HYDROCHLORIDE 2.5 MG/ML
INJECTION, SOLUTION EPIDURAL; INFILTRATION; INTRACAUDAL
Status: DISPENSED
Start: 2024-01-01

## (undated) RX ORDER — ONDANSETRON 2 MG/ML
INJECTION INTRAMUSCULAR; INTRAVENOUS
Status: DISPENSED
Start: 2023-01-01

## (undated) RX ORDER — FENTANYL CITRATE-0.9 % NACL/PF 10 MCG/ML
PLASTIC BAG, INJECTION (ML) INTRAVENOUS
Status: DISPENSED
Start: 2023-01-01

## (undated) RX ORDER — FENTANYL CITRATE-0.9 % NACL/PF 10 MCG/ML
PLASTIC BAG, INJECTION (ML) INTRAVENOUS
Status: DISPENSED
Start: 2024-01-01

## (undated) RX ORDER — FENTANYL CITRATE 50 UG/ML
INJECTION, SOLUTION INTRAMUSCULAR; INTRAVENOUS
Status: DISPENSED
Start: 2024-01-01

## (undated) RX ORDER — GLYCOPYRROLATE 0.2 MG/ML
INJECTION, SOLUTION INTRAMUSCULAR; INTRAVENOUS
Status: DISPENSED
Start: 2024-01-01

## (undated) RX ORDER — LIDOCAINE HYDROCHLORIDE 10 MG/ML
INJECTION, SOLUTION EPIDURAL; INFILTRATION; INTRACAUDAL; PERINEURAL
Status: DISPENSED
Start: 2024-01-01

## (undated) RX ORDER — DEXAMETHASONE SODIUM PHOSPHATE 10 MG/ML
INJECTION, SOLUTION INTRAMUSCULAR; INTRAVENOUS
Status: DISPENSED
Start: 2023-01-01

## (undated) RX ORDER — PROPOFOL 10 MG/ML
INJECTION, EMULSION INTRAVENOUS
Status: DISPENSED
Start: 2023-01-01

## (undated) RX ORDER — ONDANSETRON 2 MG/ML
INJECTION INTRAMUSCULAR; INTRAVENOUS
Status: DISPENSED
Start: 2024-01-01

## (undated) RX ORDER — DEXAMETHASONE SODIUM PHOSPHATE 10 MG/ML
INJECTION, SOLUTION INTRAMUSCULAR; INTRAVENOUS
Status: DISPENSED
Start: 2024-01-01